# Patient Record
Sex: MALE | Race: WHITE | Employment: FULL TIME | URBAN - METROPOLITAN AREA
[De-identification: names, ages, dates, MRNs, and addresses within clinical notes are randomized per-mention and may not be internally consistent; named-entity substitution may affect disease eponyms.]

---

## 2017-01-11 ENCOUNTER — TELEPHONE (OUTPATIENT)
Dept: GASTROENTEROLOGY | Facility: CLINIC | Age: 37
End: 2017-01-11

## 2017-01-11 ENCOUNTER — TELEPHONE (OUTPATIENT)
Dept: ALLERGY | Facility: CLINIC | Age: 37
End: 2017-01-11

## 2017-01-12 ENCOUNTER — TELEPHONE (OUTPATIENT)
Dept: FAMILY MEDICINE CLINIC | Facility: CLINIC | Age: 37
End: 2017-01-12

## 2017-01-12 ENCOUNTER — OFFICE VISIT (OUTPATIENT)
Dept: FAMILY MEDICINE CLINIC | Facility: CLINIC | Age: 37
End: 2017-01-12

## 2017-01-12 VITALS — HEART RATE: 92 BPM | TEMPERATURE: 99 F | SYSTOLIC BLOOD PRESSURE: 122 MMHG | DIASTOLIC BLOOD PRESSURE: 68 MMHG

## 2017-01-12 DIAGNOSIS — J11.1 INFLUENZA: Primary | ICD-10-CM

## 2017-01-12 PROCEDURE — 99214 OFFICE O/P EST MOD 30 MIN: CPT | Performed by: FAMILY MEDICINE

## 2017-01-12 PROCEDURE — 99212 OFFICE O/P EST SF 10 MIN: CPT | Performed by: FAMILY MEDICINE

## 2017-01-12 RX ORDER — BUDESONIDE AND FORMOTEROL FUMARATE DIHYDRATE 160; 4.5 UG/1; UG/1
2 AEROSOL RESPIRATORY (INHALATION) 2 TIMES DAILY
Qty: 1 INHALER | Refills: 3 | Status: SHIPPED | OUTPATIENT
Start: 2017-01-12 | End: 2017-04-13

## 2017-01-12 RX ORDER — AMANTADINE HYDROCHLORIDE 100 MG/1
100 TABLET ORAL 2 TIMES DAILY
Qty: 14 TABLET | Refills: 0 | Status: SHIPPED | OUTPATIENT
Start: 2017-01-12 | End: 2017-01-24

## 2017-01-12 RX ORDER — OSELTAMIVIR PHOSPHATE 75 MG/1
75 CAPSULE ORAL 2 TIMES DAILY
Qty: 10 CAPSULE | Refills: 0 | Status: SHIPPED | OUTPATIENT
Start: 2017-01-12 | End: 2017-01-12

## 2017-01-12 RX ORDER — PANTOPRAZOLE SODIUM 40 MG/1
40 TABLET, DELAYED RELEASE ORAL
Qty: 30 TABLET | Refills: 1 | Status: SHIPPED | OUTPATIENT
Start: 2017-01-12 | End: 2017-02-23

## 2017-01-12 NOTE — TELEPHONE ENCOUNTER
Pt is calling back requesting to speak with a RN pt state that he can't wait that long for a prescription pt state that he don't feel good and need a medication asap please

## 2017-01-12 NOTE — TELEPHONE ENCOUNTER
Spoke with pharmacist Denver Neighbors who states that tried different forms of medication for patient for coverage and none are covered. States that the generic needs a PA.      Please advise

## 2017-01-12 NOTE — TELEPHONE ENCOUNTER
PA for Oseltamivir Phosphate 75 mg cap completed with EPS via CMM response time 24-72 hours KEY [id number].

## 2017-01-12 NOTE — TELEPHONE ENCOUNTER
Patient reports he attempted to  medication for his flu but insurance will not cover this medication without prior authorization. Due to nature of his symptoms, he cannot wait for PA process and is requesting that we send over a covered alternative.

## 2017-01-12 NOTE — PROGRESS NOTES
Lost over 40 lbs. My stomach still hurts \"I can't really eat. \"  Has been pursuing allergy testing  Always trying to burp. Now has the flu. Has middle part of back has pain. Has the chills this am.  \"I was having numbness.   And my temp went u

## 2017-01-12 NOTE — ADDENDUM NOTE
Addended by: Heath [place] on: 1/12/2017 10:27 AM     Modules accepted: Orders, Medications, Level of Service

## 2017-01-13 NOTE — TELEPHONE ENCOUNTER
Patient paged me early today morning with c/o feeling chills, very cold, fever 102.5 and body aches, ? Flu  Recommend hydration, and push fluids, tylenol/ motrin and schedule appointment in am after 8 am.  Patient seen today in ov by Dr Jeana Rosario.

## 2017-01-13 NOTE — TELEPHONE ENCOUNTER
Contacted plan spoke with rep [address] and claim is still under review. Response time is up to 24 hours. Called patient left message stating a decision may be received sometime today from insurance. Advised to contact clinic with any questions.

## 2017-01-13 NOTE — TELEPHONE ENCOUNTER
PA approved for 5 days #10/5 days effective 1/13/2017. [facility] notified of the approval; pharmacy will notify patient when medication is ready for .  Called patient and notified of the approval.

## 2017-01-14 ENCOUNTER — HOSPITAL ENCOUNTER (OUTPATIENT)
Dept: CT IMAGING | Age: 37
Discharge: HOME | End: 2017-01-14
Attending: OTOLARYNGOLOGY
Payer: MEDICAID

## 2017-01-14 DIAGNOSIS — J32.9 CHRONIC SINUSITIS, UNSPECIFIED LOCATION: ICD-10-CM

## 2017-01-14 PROCEDURE — 70486 CT MAXILLOFACIAL W/O DYE: CPT

## 2017-01-16 ENCOUNTER — TELEPHONE (OUTPATIENT)
Dept: OTOLARYNGOLOGY | Facility: CLINIC | Age: 37
End: 2017-01-16

## 2017-01-17 NOTE — TELEPHONE ENCOUNTER
Pt informed of results and recommendation to RTC to discuss treatment options. Pt verbalized understanding; states he will c/b to schedule appt.

## 2017-01-21 ENCOUNTER — NURSE ONLY (OUTPATIENT)
Dept: ALLERGY | Facility: CLINIC | Age: 37
End: 2017-01-21

## 2017-01-21 ENCOUNTER — OFFICE VISIT (OUTPATIENT)
Dept: ALLERGY | Facility: CLINIC | Age: 37
End: 2017-01-21

## 2017-01-21 VITALS
WEIGHT: 246 LBS | BODY MASS INDEX: 36.43 KG/M2 | TEMPERATURE: 98 F | RESPIRATION RATE: 18 BRPM | SYSTOLIC BLOOD PRESSURE: 128 MMHG | HEIGHT: 69 IN | DIASTOLIC BLOOD PRESSURE: 82 MMHG | HEART RATE: 76 BPM

## 2017-01-21 DIAGNOSIS — J45.40 EXTRINSIC ASTHMA, MODERATE PERSISTENT, UNCOMPLICATED: ICD-10-CM

## 2017-01-21 DIAGNOSIS — Z91.018 FOOD ALLERGY: ICD-10-CM

## 2017-01-21 DIAGNOSIS — R13.10 DYSPHAGIA, UNSPECIFIED TYPE: Primary | ICD-10-CM

## 2017-01-21 DIAGNOSIS — Z91.018 FOOD ALLERGY: Primary | ICD-10-CM

## 2017-01-21 PROCEDURE — 94010 BREATHING CAPACITY TEST: CPT | Performed by: ALLERGY & IMMUNOLOGY

## 2017-01-21 PROCEDURE — 95004 PERQ TESTS W/ALRGNC XTRCS: CPT | Performed by: ALLERGY & IMMUNOLOGY

## 2017-01-21 PROCEDURE — 99212 OFFICE O/P EST SF 10 MIN: CPT | Performed by: ALLERGY & IMMUNOLOGY

## 2017-01-21 PROCEDURE — 99214 OFFICE O/P EST MOD 30 MIN: CPT | Performed by: ALLERGY & IMMUNOLOGY

## 2017-01-21 NOTE — PROGRESS NOTES
Siddhartha Carey is a 29year old male. HPI:   Patient presents with:  Food Allergy      Patient is a 50-year-old male who presents for follow-up and for skin testing to select allergens. Patient last seen by me on December 31, 2016.   Patient has a Age of Onset   • Asthma Mother    • Prostate Cancer Father    • Hypertension Father       Social History:   Smoking Status: Never Smoker                      Smokeless Status: Never Used                        Comment: Girlfriend smokes outside    Alcohol night sweats,weight loss, irritability and lethargy  ENMT:  Negative for ear drainage, hearing loss and nasal drainage  Eyes:  Negative for eye discharge and vision loss  Gastrointestinal:  Negative for abdominal pain, diarrhea and vomiting  Integumentary: today to environmental allergens to screen for potential allergic triggers was positive to trees grass ragweed weeds dust mite cat dog and cockroach.     Recommend weight loss given somewhat of a restrictive pattern  Recs: Continue with Symbicort 160/4.5 2

## 2017-01-23 ENCOUNTER — TELEPHONE (OUTPATIENT)
Dept: FAMILY MEDICINE CLINIC | Facility: CLINIC | Age: 37
End: 2017-01-23

## 2017-01-24 ENCOUNTER — TELEPHONE (OUTPATIENT)
Dept: FAMILY MEDICINE CLINIC | Facility: CLINIC | Age: 37
End: 2017-01-24

## 2017-01-24 ENCOUNTER — OFFICE VISIT (OUTPATIENT)
Dept: FAMILY MEDICINE CLINIC | Facility: CLINIC | Age: 37
End: 2017-01-24

## 2017-01-24 VITALS
WEIGHT: 245 LBS | HEART RATE: 96 BPM | TEMPERATURE: 98 F | BODY MASS INDEX: 36 KG/M2 | DIASTOLIC BLOOD PRESSURE: 69 MMHG | SYSTOLIC BLOOD PRESSURE: 119 MMHG

## 2017-01-24 DIAGNOSIS — J01.01 ACUTE RECURRENT MAXILLARY SINUSITIS: Primary | ICD-10-CM

## 2017-01-24 PROCEDURE — 99212 OFFICE O/P EST SF 10 MIN: CPT | Performed by: FAMILY MEDICINE

## 2017-01-24 PROCEDURE — 99213 OFFICE O/P EST LOW 20 MIN: CPT | Performed by: FAMILY MEDICINE

## 2017-01-24 RX ORDER — CLINDAMYCIN HYDROCHLORIDE 300 MG/1
300 CAPSULE ORAL 3 TIMES DAILY
Qty: 30 CAPSULE | Refills: 0 | Status: SHIPPED | OUTPATIENT
Start: 2017-01-24 | End: 2017-02-04

## 2017-01-24 NOTE — PROGRESS NOTES
Pt still with congestion. No sob  Mild rt lower chest   Stool changed color. \"I have not been eating much laterly. \"  Fever last night.     Where: nasal congestion and cough  Quality (Constant/Sharp?): sharp  Severity: mild mod pain  Duration: 3 days  Ti

## 2017-01-24 NOTE — TELEPHONE ENCOUNTER
Pt states he has not been feeling well, Pt states he currently has fever of 102. Pt states he is congested and cough with Phlegm. Pt states he also a runny nose. States he was unable to sleep last night.

## 2017-01-24 NOTE — TELEPHONE ENCOUNTER
Pt verifies info below. States when seen 1/12/17 felt better after one day on Tamiflu, but since last night has started feeling symptoms returning.  States fever started ~2 hours ago (101.9 F) and took Tylenol 1000 mg ~6pm and fever did not come down and is

## 2017-01-25 NOTE — TELEPHONE ENCOUNTER
Pt needs an excuse note for work. Please excuse him for today 01/24 and tomorrow 01/25. Pt would like to p/u note at the [address] [place] [address] office.  Pt was seen today at [address] [place] [address] at 2:40pm

## 2017-01-25 NOTE — TELEPHONE ENCOUNTER
Changed to acute. Pt was seen today for sinusitis and was prescribed Clindamycin HCl 300 MG Oral Cap. Pt stts that he cannot swallow capsules and wants to know if he can pull them apart and put the powder in applesauce and then eat the applesauce?

## 2017-01-25 NOTE — TELEPHONE ENCOUNTER
Pt is requesting to have a note to return to work  Pt was off today and will be off tomorrow   Pt would like to pick that note up tomorrow @ [place]   Please advise

## 2017-01-25 NOTE — TELEPHONE ENCOUNTER
Pt was prescribed cap at today Appt   Pt stts he can't swallow pills   Pt stts he takes the powder out to swallow is that okay  Please advise

## 2017-01-25 NOTE — TELEPHONE ENCOUNTER
Informed pt that it was find to do that provided that he takes the full dose each time. Pt voiced understanding.

## 2017-01-31 ENCOUNTER — HOSPITAL ENCOUNTER (EMERGENCY)
Facility: HOSPITAL | Age: 37
Discharge: LEFT BEFORE DISCHARGE | End: 2017-01-31
Payer: MEDICAID

## 2017-01-31 ENCOUNTER — APPOINTMENT (OUTPATIENT)
Dept: GENERAL RADIOLOGY | Facility: HOSPITAL | Age: 37
End: 2017-01-31
Payer: MEDICAID

## 2017-01-31 ENCOUNTER — TELEPHONE (OUTPATIENT)
Dept: FAMILY MEDICINE CLINIC | Facility: CLINIC | Age: 37
End: 2017-01-31

## 2017-01-31 VITALS
TEMPERATURE: 98 F | WEIGHT: 240 LBS | BODY MASS INDEX: 35.55 KG/M2 | OXYGEN SATURATION: 98 % | HEART RATE: 80 BPM | RESPIRATION RATE: 20 BRPM | HEIGHT: 69 IN | DIASTOLIC BLOOD PRESSURE: 90 MMHG | SYSTOLIC BLOOD PRESSURE: 153 MMHG

## 2017-01-31 PROCEDURE — 93005 ELECTROCARDIOGRAM TRACING: CPT

## 2017-01-31 PROCEDURE — 93010 ELECTROCARDIOGRAM REPORT: CPT | Performed by: INTERNAL MEDICINE

## 2017-01-31 NOTE — TELEPHONE ENCOUNTER
Pt states very sick  No showed for apt 1/31/17 insisting to be seen ASAP offered apts available didn't want any of them wants nurse to call him

## 2017-02-01 ENCOUNTER — OFFICE VISIT (OUTPATIENT)
Dept: FAMILY MEDICINE CLINIC | Facility: CLINIC | Age: 37
End: 2017-02-01

## 2017-02-01 ENCOUNTER — APPOINTMENT (OUTPATIENT)
Dept: LAB | Age: 37
End: 2017-02-01
Attending: FAMILY MEDICINE
Payer: MEDICAID

## 2017-02-01 VITALS
TEMPERATURE: 98 F | DIASTOLIC BLOOD PRESSURE: 77 MMHG | BODY MASS INDEX: 35 KG/M2 | WEIGHT: 240 LBS | SYSTOLIC BLOOD PRESSURE: 135 MMHG | HEART RATE: 79 BPM

## 2017-02-01 DIAGNOSIS — R10.11 RUQ ABDOMINAL PAIN: ICD-10-CM

## 2017-02-01 DIAGNOSIS — R10.13 EPIGASTRIC ABDOMINAL PAIN: ICD-10-CM

## 2017-02-01 DIAGNOSIS — H57.89 EYE DISCHARGE: ICD-10-CM

## 2017-02-01 DIAGNOSIS — H57.89 EYE DISCHARGE: Primary | ICD-10-CM

## 2017-02-01 DIAGNOSIS — R19.7 DIARRHEA, UNSPECIFIED TYPE: ICD-10-CM

## 2017-02-01 LAB
ALBUMIN SERPL BCP-MCNC: 4 G/DL (ref 3.5–4.8)
ALP SERPL-CCNC: 70 U/L (ref 32–100)
ALT SERPL-CCNC: 27 U/L (ref 17–63)
AMYLASE SERPL-CCNC: 40 U/L (ref 24–108)
ANION GAP SERPL CALC-SCNC: 8 MMOL/L (ref 0–18)
AST SERPL-CCNC: 20 U/L (ref 15–41)
BILIRUB DIRECT SERPL-MCNC: 0.1 MG/DL (ref 0–0.2)
BILIRUB SERPL-MCNC: 0.9 MG/DL (ref 0.3–1.2)
BILIRUB UR QL: NEGATIVE
BUN SERPL-MCNC: 11 MG/DL (ref 8–20)
BUN/CREAT SERPL: 13.1 (ref 10–20)
CALCIUM SERPL-MCNC: 9.5 MG/DL (ref 8.5–10.5)
CHLORIDE SERPL-SCNC: 104 MMOL/L (ref 95–110)
CHOLEST SERPL-MCNC: 193 MG/DL (ref 110–200)
CO2 SERPL-SCNC: 29 MMOL/L (ref 22–32)
COLOR UR: YELLOW
CREAT SERPL-MCNC: 0.84 MG/DL (ref 0.5–1.5)
GLUCOSE SERPL-MCNC: 88 MG/DL (ref 70–99)
GLUCOSE UR-MCNC: NEGATIVE MG/DL
HDLC SERPL-MCNC: 29 MG/DL
HGB UR QL STRIP.AUTO: NEGATIVE
KETONES UR-MCNC: NEGATIVE MG/DL
LDLC SERPL CALC-MCNC: 124 MG/DL (ref 0–99)
LEUKOCYTE ESTERASE UR QL STRIP.AUTO: NEGATIVE
LIPASE SERPL-CCNC: 17 U/L (ref 22–51)
NITRITE UR QL STRIP.AUTO: NEGATIVE
NONHDLC SERPL-MCNC: 164 MG/DL
OSMOLALITY UR CALC.SUM OF ELEC: 291 MOSM/KG (ref 275–295)
PH UR: 8 [PH] (ref 5–8)
POTASSIUM SERPL-SCNC: 4.1 MMOL/L (ref 3.3–5.1)
PROT SERPL-MCNC: 7.3 G/DL (ref 5.9–8.4)
PROT UR-MCNC: 100 MG/DL
RBC #/AREA URNS AUTO: 2 /HPF
SODIUM SERPL-SCNC: 141 MMOL/L (ref 136–144)
SP GR UR STRIP: 1.02 (ref 1–1.03)
TRIGL SERPL-MCNC: 200 MG/DL (ref 1–149)
TSH SERPL-ACNC: 2.07 UIU/ML (ref 0.34–5.6)
UROBILINOGEN UR STRIP-ACNC: <2
VIT C UR-MCNC: 40 MG/DL
WBC #/AREA URNS AUTO: 4 /HPF

## 2017-02-01 PROCEDURE — 83690 ASSAY OF LIPASE: CPT

## 2017-02-01 PROCEDURE — 84443 ASSAY THYROID STIM HORMONE: CPT

## 2017-02-01 PROCEDURE — 36415 COLL VENOUS BLD VENIPUNCTURE: CPT

## 2017-02-01 PROCEDURE — 80048 BASIC METABOLIC PNL TOTAL CA: CPT

## 2017-02-01 PROCEDURE — 80076 HEPATIC FUNCTION PANEL: CPT

## 2017-02-01 PROCEDURE — 80061 LIPID PANEL: CPT

## 2017-02-01 PROCEDURE — 87338 HPYLORI STOOL AG IA: CPT

## 2017-02-01 PROCEDURE — 82150 ASSAY OF AMYLASE: CPT

## 2017-02-01 PROCEDURE — 81001 URINALYSIS AUTO W/SCOPE: CPT

## 2017-02-01 PROCEDURE — 99214 OFFICE O/P EST MOD 30 MIN: CPT | Performed by: FAMILY MEDICINE

## 2017-02-01 PROCEDURE — 99212 OFFICE O/P EST SF 10 MIN: CPT | Performed by: FAMILY MEDICINE

## 2017-02-01 NOTE — TELEPHONE ENCOUNTER
Pt states that he was seen on 1/24/17. He has no improvement since LOV. Denies new symptoms. He stated that his f/u appt was miscommunicated to him, which is why he missed his appt. He will keep appt for tomorrow.

## 2017-02-01 NOTE — PROGRESS NOTES
Blood pressure 135/77, pulse 79, temperature 98 °F (36.7 °C), temperature source Oral, weight 240 lb (108.863 kg), peak flow 400 L/min. Patient presents today complaining of 10 days of nasal congestion. History of asthma.   No dyspnea at this time some sn

## 2017-02-01 NOTE — TELEPHONE ENCOUNTER
Patient said he did go to his apt but no one was there --    He made apt to see Ga Aparicio tomorrow  8:40 am

## 2017-02-03 ENCOUNTER — HOSPITAL ENCOUNTER (OUTPATIENT)
Dept: ULTRASOUND IMAGING | Age: 37
Discharge: HOME | End: 2017-02-03
Attending: FAMILY MEDICINE
Payer: MEDICAID

## 2017-02-03 ENCOUNTER — TELEPHONE (OUTPATIENT)
Dept: INTERNAL MEDICINE CLINIC | Facility: CLINIC | Age: 37
End: 2017-02-03

## 2017-02-03 DIAGNOSIS — R10.11 RUQ ABDOMINAL PAIN: ICD-10-CM

## 2017-02-03 DIAGNOSIS — H57.89 EYE DISCHARGE: ICD-10-CM

## 2017-02-03 PROCEDURE — 76705 ECHO EXAM OF ABDOMEN: CPT

## 2017-02-03 RX ORDER — AMOXICILLIN AND CLAVULANATE POTASSIUM 875; 125 MG/1; MG/1
1 TABLET, FILM COATED ORAL 2 TIMES DAILY
Qty: 20 TABLET | Refills: 0 | Status: SHIPPED | OUTPATIENT
Start: 2017-02-03 | End: 2017-02-04

## 2017-02-03 NOTE — TELEPHONE ENCOUNTER
Dr. Rahel East - pt states that augmentin is always prescribed to him and it never works. Please advise.

## 2017-02-03 NOTE — TELEPHONE ENCOUNTER
Dr. Antonio Schneider - pt [address] [place] [address] 2/1/17. You had advised that he call you today if he has no improvement. Pt states he has the exact same symptoms. Please advise.

## 2017-02-03 NOTE — TELEPHONE ENCOUNTER
Pt stated he not improving, stated still coughing and has a lot of phlegm, also requesting lab results.

## 2017-02-04 ENCOUNTER — TELEPHONE (OUTPATIENT)
Dept: FAMILY MEDICINE CLINIC | Facility: CLINIC | Age: 37
End: 2017-02-04

## 2017-02-04 LAB — HELICOBACTER PYLORI AG, FECAL: NEGATIVE

## 2017-02-04 RX ORDER — CIPROFLOXACIN 500 MG/1
500 TABLET, FILM COATED ORAL 2 TIMES DAILY
Qty: 20 TABLET | Refills: 0 | Status: SHIPPED | OUTPATIENT
Start: 2017-02-04 | End: 2017-02-04

## 2017-02-04 RX ORDER — CIPROFLOXACIN 500 MG/1
500 TABLET, FILM COATED ORAL 2 TIMES DAILY
Qty: 20 TABLET | Refills: 0 | Status: SHIPPED | OUTPATIENT
Start: 2017-02-04 | End: 2017-02-23

## 2017-02-04 NOTE — TELEPHONE ENCOUNTER
Dr [facility] for [facility], pt asking that you be asked to change the antibiotic from Augmentin to something else in regards to his sinus problem. See messages below and advise.  Pt adds that he was supposed to see you for this issue but there was a scheduling mix-up a

## 2017-02-04 NOTE — TELEPHONE ENCOUNTER
Patient informed of gallbladder US results (identified name and ) and recommendations, as per Dr. Betzy Escobar result note copied below. Pt states already scheduled appt with Gen Surg (17) and will go to ER if severe abdominal pain, fever, or vomiting.

## 2017-02-06 ENCOUNTER — TELEPHONE (OUTPATIENT)
Dept: FAMILY MEDICINE CLINIC | Facility: CLINIC | Age: 37
End: 2017-02-06

## 2017-02-06 NOTE — TELEPHONE ENCOUNTER
Patient states he has not yet heard about his test results and has requested to speak with RN.     Result Notes      Notes Recorded by Zaina Dover RN on 2/4/2017 at 11:05 AM  Please reference telephone encounter dated 2/4/17.    ------    Notes Re

## 2017-02-06 NOTE — TELEPHONE ENCOUNTER
LM on pharmacy voice mail that Dr Zander Sabillon is aware of interraction and authorized override of warning and dispense the Cipro

## 2017-02-06 NOTE — TELEPHONE ENCOUNTER
Dr. [facility] or  [facility] [place]- pt requests note for work to only work 1/2 days due to recent medical dx. See LOV notes and US results. Pt is scheduled to see general surgery 2/8/17.

## 2017-02-08 ENCOUNTER — OFFICE VISIT (OUTPATIENT)
Dept: SURGERY | Facility: CLINIC | Age: 37
End: 2017-02-08

## 2017-02-08 VITALS — WEIGHT: 240 LBS | HEIGHT: 69 IN | BODY MASS INDEX: 35.55 KG/M2

## 2017-02-08 DIAGNOSIS — K80.10 CALCULUS OF GALLBLADDER WITH CHRONIC CHOLECYSTITIS WITHOUT OBSTRUCTION: Primary | ICD-10-CM

## 2017-02-08 PROCEDURE — 99212 OFFICE O/P EST SF 10 MIN: CPT | Performed by: SURGERY

## 2017-02-08 PROCEDURE — 99204 OFFICE O/P NEW MOD 45 MIN: CPT | Performed by: SURGERY

## 2017-02-09 ENCOUNTER — OFFICE VISIT (OUTPATIENT)
Dept: FAMILY MEDICINE CLINIC | Facility: CLINIC | Age: 37
End: 2017-02-09

## 2017-02-09 VITALS
TEMPERATURE: 98 F | SYSTOLIC BLOOD PRESSURE: 135 MMHG | RESPIRATION RATE: 18 BRPM | BODY MASS INDEX: 35 KG/M2 | HEART RATE: 74 BPM | WEIGHT: 240 LBS | DIASTOLIC BLOOD PRESSURE: 79 MMHG

## 2017-02-09 DIAGNOSIS — R09.81 NASAL CONGESTION: Primary | ICD-10-CM

## 2017-02-09 DIAGNOSIS — R10.11 RUQ ABDOMINAL PAIN: ICD-10-CM

## 2017-02-09 LAB
CONTROL LINE PRESENT WITH A CLEAR BACKGROUND (YES/NO): YES YES/NO
KIT LOT #: NORMAL NUMERIC
STREP GRP A CUL-SCR: NEGATIVE

## 2017-02-09 PROCEDURE — 99213 OFFICE O/P EST LOW 20 MIN: CPT | Performed by: FAMILY MEDICINE

## 2017-02-09 PROCEDURE — 99212 OFFICE O/P EST SF 10 MIN: CPT | Performed by: FAMILY MEDICINE

## 2017-02-09 PROCEDURE — 87880 STREP A ASSAY W/OPTIC: CPT | Performed by: FAMILY MEDICINE

## 2017-02-09 NOTE — PROGRESS NOTES
Blood pressure 135/79, pulse 74, temperature 98.1 °F (36.7 °C), temperature source Oral, resp. rate 18, weight 240 lb (108.863 kg). Events today complaining of continuing nasal congestion in 2-3 day history of sore throat. Currently on Cipro.   Has a coug

## 2017-02-10 ENCOUNTER — TELEPHONE (OUTPATIENT)
Dept: SURGERY | Facility: CLINIC | Age: 37
End: 2017-02-10

## 2017-02-10 DIAGNOSIS — K80.10 CALCULUS OF GALLBLADDER WITH CHOLECYSTITIS WITHOUT BILIARY OBSTRUCTION, UNSPECIFIED CHOLECYSTITIS ACUITY: Primary | ICD-10-CM

## 2017-02-10 NOTE — TELEPHONE ENCOUNTER
CT abdomen and pelvis scheduled 02/18/2017. Patient has [employer], started prior authorization. Tracking #[id number] per [address] Awaiting response from [address]

## 2017-02-10 NOTE — TELEPHONE ENCOUNTER
LM informing patient of CT Abdomen and Pelvis and CBC ordered by Dr. Annie Holloway prior to scheduled surgery on 03/03/2017. These tests need to be completed and resulted before procedure.  Given # to central scheduling [phone], and CB number for office given 33

## 2017-02-10 NOTE — PATIENT INSTRUCTIONS
Laparoscopic Cholecystectomy and Intra Operative Cholangiogram, possible open. Laparoscopic Cholecystectomy and Intra Operative Cholangiogram, Possible Open.  Pros and cons and possible complication were presented and discussed, infection, bleeding/Hematom

## 2017-02-13 NOTE — TELEPHONE ENCOUNTER
Per [facility], request tracking #[id number] has been approved for CT abdomen and pelvis scheduled 02/18/2017. Document placed in bin to scan.

## 2017-02-16 ENCOUNTER — TELEPHONE (OUTPATIENT)
Dept: SURGERY | Facility: CLINIC | Age: 37
End: 2017-02-16

## 2017-02-16 ENCOUNTER — TELEPHONE (OUTPATIENT)
Dept: FAMILY MEDICINE CLINIC | Facility: CLINIC | Age: 37
End: 2017-02-16

## 2017-02-16 NOTE — TELEPHONE ENCOUNTER
[facility] patient is frustrated with the answer I relayed to him, states he is just looking to see if there is something OTC he may take

## 2017-02-16 NOTE — TELEPHONE ENCOUNTER
Reason for Call/Chief Complaint: States has been feeling nauseated   Onset: Few weeks   Nursing Assessment/Associated Symptoms: Patient states is scheduled for gallbladder surgery 3/3 and states has been having an increase in nausea.  Denies vomiting, diarr

## 2017-02-16 NOTE — TELEPHONE ENCOUNTER
Pt calling states she has see GS and has surgery scheduled for 03/03 for gallbladder removal. Pt states he is having nausea, abdominal pain, pt would like to know if there is any thing he can take to help sx. Please advise.

## 2017-02-17 NOTE — TELEPHONE ENCOUNTER
Patient returned call. Advised him to make sure he continues to eat bland foods, avoid greasy, fatty foods and dairy products. Patient could try ginger ale or carbonated beverages to help settle stomach, or an antacid. Also important to stay hydrated.  Kemal

## 2017-02-18 ENCOUNTER — HOSPITAL ENCOUNTER (OUTPATIENT)
Dept: CT IMAGING | Age: 37
Discharge: HOME | End: 2017-02-18
Attending: SURGERY
Payer: MEDICAID

## 2017-02-18 ENCOUNTER — LAB ENCOUNTER (OUTPATIENT)
Dept: LAB | Age: 37
End: 2017-02-18
Attending: SURGERY
Payer: MEDICAID

## 2017-02-18 DIAGNOSIS — K80.10 CALCULUS OF GALLBLADDER WITH CHOLECYSTITIS WITHOUT BILIARY OBSTRUCTION, UNSPECIFIED CHOLECYSTITIS ACUITY: ICD-10-CM

## 2017-02-18 LAB
BASOPHILS # BLD: 0.1 K/UL (ref 0–0.2)
BASOPHILS NFR BLD: 1 %
CREAT BLD-MCNC: 0.8 MG/DL (ref 0.5–1.5)
EOSINOPHIL # BLD: 0.4 K/UL (ref 0–0.7)
EOSINOPHIL NFR BLD: 4 %
ERYTHROCYTE [DISTWIDTH] IN BLOOD BY AUTOMATED COUNT: 14.3 % (ref 11–15)
HCT VFR BLD AUTO: 43.8 % (ref 41–52)
HGB BLD-MCNC: 14.4 G/DL (ref 13.5–17.5)
LYMPHOCYTES # BLD: 2.9 K/UL (ref 1–4)
LYMPHOCYTES NFR BLD: 29 %
MCH RBC QN AUTO: 27.3 PG (ref 27–32)
MCHC RBC AUTO-ENTMCNC: 32.9 G/DL (ref 32–37)
MCV RBC AUTO: 83 FL (ref 80–100)
MONOCYTES # BLD: 0.7 K/UL (ref 0–1)
MONOCYTES NFR BLD: 7 %
NEUTROPHILS # BLD AUTO: 5.9 K/UL (ref 1.8–7.7)
NEUTROPHILS NFR BLD: 60 %
PLATELET # BLD AUTO: 251 K/UL (ref 140–400)
PMV BLD AUTO: 8.1 FL (ref 7.4–10.3)
RBC # BLD AUTO: 5.28 M/UL (ref 4.5–5.9)
WBC # BLD AUTO: 9.9 K/UL (ref 4–11)

## 2017-02-18 PROCEDURE — 82565 ASSAY OF CREATININE: CPT

## 2017-02-18 PROCEDURE — 74177 CT ABD & PELVIS W/CONTRAST: CPT

## 2017-02-18 PROCEDURE — 85025 COMPLETE CBC W/AUTO DIFF WBC: CPT

## 2017-02-18 PROCEDURE — 36415 COLL VENOUS BLD VENIPUNCTURE: CPT

## 2017-02-20 ENCOUNTER — TELEPHONE (OUTPATIENT)
Dept: SURGERY | Facility: CLINIC | Age: 37
End: 2017-02-20

## 2017-02-20 NOTE — TELEPHONE ENCOUNTER
Contacted patient. CT and labwork results will be reviewed by Dr. Keila Pike this afternoon. Will call patient with results and any further recommendations later today after his review.  Patient verbalized understanding, denied further complaints or concerns at th

## 2017-02-21 ENCOUNTER — TELEPHONE (OUTPATIENT)
Dept: SURGERY | Facility: CLINIC | Age: 37
End: 2017-02-21

## 2017-02-21 DIAGNOSIS — R19.09 ABDOMINAL MASS OF OTHER SITE: Primary | ICD-10-CM

## 2017-02-21 NOTE — TELEPHONE ENCOUNTER
Contacted patient. Will review results with Dr. Johnson Butler tomorrow, 02/22/2017 and will call patient back with results and his recommendations. Patient verbalized understanding, denied further complaints or concerns.

## 2017-02-22 ENCOUNTER — TELEPHONE (OUTPATIENT)
Dept: FAMILY MEDICINE CLINIC | Facility: CLINIC | Age: 37
End: 2017-02-22

## 2017-02-22 DIAGNOSIS — R93.5 ABNORMAL CT OF THE ABDOMEN: Primary | ICD-10-CM

## 2017-02-23 ENCOUNTER — TELEPHONE (OUTPATIENT)
Dept: SURGERY | Facility: CLINIC | Age: 37
End: 2017-02-23

## 2017-02-23 ENCOUNTER — TELEPHONE (OUTPATIENT)
Dept: GASTROENTEROLOGY | Facility: CLINIC | Age: 37
End: 2017-02-23

## 2017-02-23 ENCOUNTER — OFFICE VISIT (OUTPATIENT)
Dept: FAMILY MEDICINE CLINIC | Facility: CLINIC | Age: 37
End: 2017-02-23

## 2017-02-23 VITALS
SYSTOLIC BLOOD PRESSURE: 120 MMHG | WEIGHT: 241.31 LBS | BODY MASS INDEX: 36 KG/M2 | DIASTOLIC BLOOD PRESSURE: 76 MMHG | TEMPERATURE: 98 F | HEART RATE: 69 BPM

## 2017-02-23 DIAGNOSIS — Q89.09 SPLEEN ANOMALY: Primary | ICD-10-CM

## 2017-02-23 DIAGNOSIS — R10.11 RUQ ABDOMINAL PAIN: ICD-10-CM

## 2017-02-23 DIAGNOSIS — R10.13 EPIGASTRIC PAIN: ICD-10-CM

## 2017-02-23 PROCEDURE — 99212 OFFICE O/P EST SF 10 MIN: CPT | Performed by: FAMILY MEDICINE

## 2017-02-23 PROCEDURE — 99214 OFFICE O/P EST MOD 30 MIN: CPT | Performed by: FAMILY MEDICINE

## 2017-02-23 RX ORDER — PANTOPRAZOLE SODIUM 40 MG/1
40 TABLET, DELAYED RELEASE ORAL
Qty: 60 TABLET | Refills: 1 | Status: SHIPPED | OUTPATIENT
Start: 2017-02-23 | End: 2017-03-10

## 2017-02-23 NOTE — TELEPHONE ENCOUNTER
Pt is states he had a CT Scan and Dr Troy Gordon is recommending pt to see EMS again. Pt requesting to speak with RN about findings. Pls call. Thank you.

## 2017-02-23 NOTE — TELEPHONE ENCOUNTER
Pt has chronic nausea, negative EGD, ultrasound showed cholelithiasis. Dr. Troy Gordon ordered CT abdomen and pelvis which showed 4 splenic lesions. He is referred back to me.   MRI has been ordered per Dr. Troy Gordon, please schedule return appointment with me

## 2017-02-23 NOTE — TELEPHONE ENCOUNTER
Pt is contacted re: below and he states that he will see any GI MD who is first available; he had appt with Dr. Sean Solorio on 12/27/16 for GERD and dysphagia and then EGD on 1/11/17; he is made aware that message will be sent to Dr. Sean Solorio first since she last saw

## 2017-02-23 NOTE — IMAGING NOTE
Spoke w/pt about inhaler use. He uses Proair at least BID if not up to 6 x daily in addition to Symbicort. Discussed need for pretx with 13/7/1 hour prednisone prep to prevent reactive airway issue.  Pt verbalized understanding and will discuss with his ho

## 2017-02-23 NOTE — PROGRESS NOTES
Pt can't lay down for an mri of abd  Needs open mri. Pt with a lot of pain in anterior abd. \"I can't go to work all the time. \"  Not loosing weight. Ct showed something in spleen. Asthma is controlled.     Patient's past medical surgical family soc

## 2017-02-23 NOTE — TELEPHONE ENCOUNTER
Pt. States that prior Pedor George is needed to get MRI of Abdomen done - Pt. Requesting for RN to call Pedro George in - Phone # [phone] and the Case Reference # [id number]. Pt. Requesting to get a call back with auth info. ,

## 2017-02-23 NOTE — TELEPHONE ENCOUNTER
Pt stts that Dr. Jim Gallo wants pt to see Triston Narayan for masses on his spleen and a hernia.  Please see pt's CT results

## 2017-02-23 NOTE — TELEPHONE ENCOUNTER
Discussed CT scan with MEV- there are some abnormalities, cancel surgery for now, pt to have an MRI of the abdomen and evaluation by GI. I called pt, gave him results, MEV's recommendations to cancel surgery, have MRI and see GI.  Pt voices understanding

## 2017-02-23 NOTE — TELEPHONE ENCOUNTER
Contacted patient. Relayed information per Dr. Treasure Núñez, see telephone encounter 02/21/2017. Patient is to see GI Dr. Svitlana Mak and have MRI performed for closer look of abnormalities detected on CT.  Patient verbalized understanding, denied further complaints or con

## 2017-02-24 ENCOUNTER — TELEPHONE (OUTPATIENT)
Dept: SURGERY | Facility: CLINIC | Age: 37
End: 2017-02-24

## 2017-02-24 NOTE — TELEPHONE ENCOUNTER
Authorization request was already started by patient, clinical information was faxed to [employer] at [phone]. Please see other TE from 02/24/2017.

## 2017-02-24 NOTE — TELEPHONE ENCOUNTER
Contacted patient. Patient started prior authorization request for MRI abdomen. Tracking # [id number]. Received request from [facility] this AM asking for further clinical information.  Clinical information was faxed to [facility]ainer [facility] at [phone], received fax confirmatio

## 2017-02-24 NOTE — TELEPHONE ENCOUNTER
[facility] states that MRI abdomen has been approved . Patient can schedule at [facility] [address] at [address] [place] [address], [address] [place] [address],[address] only. Ph: [phone]. Gave Authorization # [id number]. And can schedule from 02/23-03/25/17. Please advise.  Thank you

## 2017-02-27 ENCOUNTER — TELEPHONE (OUTPATIENT)
Dept: SURGERY | Facility: CLINIC | Age: 37
End: 2017-02-27

## 2017-02-27 NOTE — TELEPHONE ENCOUNTER
Contacted patient, informed him of below. Patient states he has an appt to have MRI performed today at 1200 at [place] [address]. Also LM with fax number so results can be faxed to us once available.  Patient verbalized understanding, denied further complain

## 2017-02-27 NOTE — TELEPHONE ENCOUNTER
CT report faxed to [phone] Attn. Lissa Restrepo as requested. 1329: Received confirmation receipt, status \"completed\" from [phone]. Documents placed in bin to scan.

## 2017-02-27 NOTE — TELEPHONE ENCOUNTER
Maged [place] requesting a copy of patient CT report faxed over so she can compare side to side the MRI and CT. Please fax to [phone].

## 2017-02-28 ENCOUNTER — TELEPHONE (OUTPATIENT)
Dept: SURGERY | Facility: CLINIC | Age: 37
End: 2017-02-28

## 2017-02-28 ENCOUNTER — HOSPITAL ENCOUNTER (OUTPATIENT)
Dept: MRI IMAGING | Age: 37
Discharge: HOME | End: 2017-02-28
Attending: SURGERY
Payer: MEDICAID

## 2017-02-28 NOTE — TELEPHONE ENCOUNTER
Patient had MRI done yesterday [place] [facility] at [facility]. Requesting results. Please advise. Thank you.

## 2017-02-28 NOTE — TELEPHONE ENCOUNTER
LM-Have not yet received MRI results from [place] [address] MRI. Need results faxed over, if we receive results today or tomorrow Dr. Marya Davis can review them tomorrow 03/01/2017. We will then call patient back with results and Dr. Portillo Reveal recommendations.  CB number g

## 2017-03-01 NOTE — TELEPHONE ENCOUNTER
Patient calling again. States [facility] faxed over results yesterday on 02/28 around 2pm. Would like to get results. Please advise. Thank you.

## 2017-03-02 NOTE — TELEPHONE ENCOUNTER
Discussed MRI results with MEV- We need the MRI disc so [address] [place] [address] Radiologist can review it. Pt also needs to see GI/ MEV. I called pt, explained per MEV he wants the MRI disc so the [address] [place] [address] Radiologist can compare to ct scan.  Explain MEV also wants pt to see

## 2017-03-02 NOTE — TELEPHONE ENCOUNTER
Pt contacted and he states he did not complete the EGD in January b/c he was not feeling well at the time. He states that he does NOT understanding why he is evening seeing GI as he is no longer having gallbladder surgery.  He was upset and wanted to know w

## 2017-03-02 NOTE — TELEPHONE ENCOUNTER
Patient arrived at  asking for copy of MRI report. Copy of report given to patient. Patient also dropped off MRI disc to be read by one of our radiologists.

## 2017-03-02 NOTE — TELEPHONE ENCOUNTER
Please see if patient had EGD which he canceled previously. ( January 11,2017) I cannot find report. If he has had EGD done, then placed report on my desk.   If he has not had EGD done, then schedule him directly for EGD next week to cancel Monday office v

## 2017-03-02 NOTE — TELEPHONE ENCOUNTER
Pt contacted and he is not able to come in on [email or web address]. He is wondering if he can be seen at 2pm on Tuesday 3/7/17, you are on-call that day. Please advise if it is OK? MRI results received and placed on your desk for your review, thank you.

## 2017-03-02 NOTE — TELEPHONE ENCOUNTER
Check to see if patient had EGD done in January as advised. If he has not, then's reschedule EGD and cancel office visit appointment. I have discussed the case with Dr. presley who agrees that patient needs upper endoscopy done.   No I cannot see him at

## 2017-03-03 ENCOUNTER — TELEPHONE (OUTPATIENT)
Dept: FAMILY MEDICINE CLINIC | Facility: CLINIC | Age: 37
End: 2017-03-03

## 2017-03-03 ENCOUNTER — OFFICE VISIT (OUTPATIENT)
Dept: FAMILY MEDICINE CLINIC | Facility: CLINIC | Age: 37
End: 2017-03-03

## 2017-03-03 ENCOUNTER — TELEPHONE (OUTPATIENT)
Dept: GASTROENTEROLOGY | Facility: CLINIC | Age: 37
End: 2017-03-03

## 2017-03-03 VITALS
WEIGHT: 248.81 LBS | HEART RATE: 71 BPM | DIASTOLIC BLOOD PRESSURE: 81 MMHG | BODY MASS INDEX: 37 KG/M2 | SYSTOLIC BLOOD PRESSURE: 152 MMHG

## 2017-03-03 DIAGNOSIS — Z00.00 ROUTINE PHYSICAL EXAMINATION: ICD-10-CM

## 2017-03-03 DIAGNOSIS — K81.9 CHOLECYSTITIS: ICD-10-CM

## 2017-03-03 DIAGNOSIS — Q89.09 SPLEEN ANOMALY: Primary | ICD-10-CM

## 2017-03-03 PROCEDURE — 99395 PREV VISIT EST AGE 18-39: CPT | Performed by: FAMILY MEDICINE

## 2017-03-03 NOTE — TELEPHONE ENCOUNTER
Pt contacted and reviewed the message below in detail. He verbalized understanding, but states he already had an XR esophagus on 12/16/16 and that was normal so he will NOT go through with the EGD.  He states he wants to know what the next steps are in rega

## 2017-03-03 NOTE — TELEPHONE ENCOUNTER
Pt has been scheduled for the following. Scheduled for:  EGD 05802  Date: Wednesday, March 22, 2017  Location:  [facility]  Sedation:  IV  Time:  2pm (arrival 1pm)  Prep: NPO at midnight  Meds/Allergies Reconciled?:  Yes.   Diagnosis with codes:  Dysphagia R13

## 2017-03-03 NOTE — PROGRESS NOTES
Blood pressure 152/81, pulse 71, weight 248 lb 12.8 oz (112.855 kg). Patient presents today  with abnormal abdominal MRI. Lesions were shown on the spleen. He is awaiting the review of radiology.   He otherwise feels well denies chest pain dyspnea tr

## 2017-03-03 NOTE — TELEPHONE ENCOUNTER
Per pt, he spoke with Dr Diane Zhang and dr told him that he needs to go and see an Oncology dr due to they found out a lesion in his spleen area. No appt yet.

## 2017-03-03 NOTE — TELEPHONE ENCOUNTER
I have spoken to the patient at length regarding the results of his CT scan, MRI, and ultrasound results. I have recommended that he get upper endoscopy performed, however he \"just does not want to do it\".   He had dysphagia and a normal esophagram.  He

## 2017-03-03 NOTE — TELEPHONE ENCOUNTER
Patient was seen in office by Dr. Tanja Craven and was scheduled for laparoscopic cholecystectomy on 02/08/2017. Patient was required to complete CT abdomen and pelvis and a CBC prior to having surgery.  CT abdomen and pelvis was performed on 02/18/2017, showed

## 2017-03-03 NOTE — TELEPHONE ENCOUNTER
Pt contacted and he was concerned about the insurance coverage, I informed him that with [employer] the procedure must be scheduled at [address] [place] [address] and no pre-authorization is required. He verbalized understanding.

## 2017-03-03 NOTE — TELEPHONE ENCOUNTER
Pt informed CT was taken to be scanned this AM.  Dr. Jose David will review and we will call him Monday with next step. Reminded pt  that in the mean time he need to procede with GI consult prior to surgery with MEV.

## 2017-03-06 ENCOUNTER — TELEPHONE (OUTPATIENT)
Dept: FAMILY MEDICINE CLINIC | Facility: CLINIC | Age: 37
End: 2017-03-06

## 2017-03-06 ENCOUNTER — NURSE ONLY (OUTPATIENT)
Dept: FAMILY MEDICINE CLINIC | Facility: CLINIC | Age: 37
End: 2017-03-06

## 2017-03-06 ENCOUNTER — TELEPHONE (OUTPATIENT)
Dept: SURGERY | Facility: CLINIC | Age: 37
End: 2017-03-06

## 2017-03-06 DIAGNOSIS — Z11.1 SCREENING-PULMONARY TB: Primary | ICD-10-CM

## 2017-03-06 PROCEDURE — 86580 TB INTRADERMAL TEST: CPT | Performed by: FAMILY MEDICINE

## 2017-03-06 NOTE — TELEPHONE ENCOUNTER
Patient contacted, states that he would like RN to contact Dr. Daniels Double office to see if endoscopy could be scheduled sooner. Informed patient that I am not sure of Dr. Jeremiah Lenz procedure schedule, I will forward to GI RN's. Patient verbalized understanding.

## 2017-03-06 NOTE — TELEPHONE ENCOUNTER
Please see TE from 2/28/17- Pt on 3/3/17 refused to have EGD scheduled and done at that time, and states he was asymptomatic and now demanding to be seen asap.      Reviewed Dr. Pozo Flair schedule for procedure's and currently there aren't any sooner appts [place]

## 2017-03-06 NOTE — TELEPHONE ENCOUNTER
I am here Friday until 11 AM for procedures. Please schedule him for this Friday, March 9 in the a.m. before 11 AM.  Also there are 2 procedures on Thursday that should be moved to Friday, please talk to [place] about that.

## 2017-03-06 NOTE — TELEPHONE ENCOUNTER
Patient calling to requesting to speak with Meche Gomez about his results from abdominal MRI and CT. Patient states depending on results, he may need referral for GI.    Requesting call back

## 2017-03-06 NOTE — PROGRESS NOTES
Patient was seen for his TB screening. Patient informed to return within 48-72 hours for test reading.

## 2017-03-06 NOTE — TELEPHONE ENCOUNTER
Aia 16, patient is requesting for you to look at his imaging results. Dr. Sherman Numbers recommended and EGD but no availability any time soon. Patient stated he was told to have Aicathie 16 decide whether he needs to just have a follow up test in 6 months or see oncology.  P

## 2017-03-06 NOTE — TELEPHONE ENCOUNTER
Scheduled for:  EGD 17713  Date:    From-3/22/17  To-3/10/17  Location:  [facility]  Sedation:  IV  Time:   From-1400  To-0730  Prep:  NPO after midnight  Meds/Allergies Reconciled?:  Yes  Diagnosis with codes:  Dysphagia R13.10  Was patient informed to call insu

## 2017-03-07 NOTE — TELEPHONE ENCOUNTER
Recommendations per Dr. [facility] reviewed. Pt verbalized understanding, agreed with information relayed, and denied further questions at this time.

## 2017-03-08 ENCOUNTER — NURSE ONLY (OUTPATIENT)
Dept: FAMILY MEDICINE CLINIC | Facility: CLINIC | Age: 37
End: 2017-03-08

## 2017-03-08 DIAGNOSIS — Z23 NEED FOR TUBERCULOSIS VACCINATION: Primary | ICD-10-CM

## 2017-03-08 LAB — INDURATION (): 0 MM (ref 0–11)

## 2017-03-08 NOTE — PROGRESS NOTES
Pt was verified via name and date of birth and result was read of right arm with negative results.  Pt understood results and stated that he has an appt on Friday for f/u

## 2017-03-10 ENCOUNTER — SURGERY (OUTPATIENT)
Age: 37
End: 2017-03-10

## 2017-03-10 ENCOUNTER — HOSPITAL ENCOUNTER (OUTPATIENT)
Facility: HOSPITAL | Age: 37
Setting detail: HOSPITAL OUTPATIENT SURGERY
Discharge: HOME | End: 2017-03-10
Attending: INTERNAL MEDICINE | Admitting: INTERNAL MEDICINE
Payer: MEDICAID

## 2017-03-10 ENCOUNTER — OFFICE VISIT (OUTPATIENT)
Dept: FAMILY MEDICINE CLINIC | Facility: CLINIC | Age: 37
End: 2017-03-10

## 2017-03-10 VITALS
SYSTOLIC BLOOD PRESSURE: 127 MMHG | DIASTOLIC BLOOD PRESSURE: 77 MMHG | HEART RATE: 80 BPM | WEIGHT: 238 LBS | BODY MASS INDEX: 35 KG/M2

## 2017-03-10 DIAGNOSIS — R09.81 NASAL CONGESTION: ICD-10-CM

## 2017-03-10 DIAGNOSIS — K21.00 GASTROESOPHAGEAL REFLUX DISEASE WITH ESOPHAGITIS: Primary | ICD-10-CM

## 2017-03-10 DIAGNOSIS — J34.3 HYPERTROPHY OF NASAL TURBINATES: ICD-10-CM

## 2017-03-10 PROBLEM — K20.90 ESOPHAGITIS: Status: ACTIVE | Noted: 2017-03-10

## 2017-03-10 PROBLEM — K29.70 GASTRITIS: Status: ACTIVE | Noted: 2017-03-10

## 2017-03-10 PROCEDURE — 0DB68ZX EXCISION OF STOMACH, VIA NATURAL OR ARTIFICIAL OPENING ENDOSCOPIC, DIAGNOSTIC: ICD-10-PCS | Performed by: INTERNAL MEDICINE

## 2017-03-10 PROCEDURE — 0DB38ZX EXCISION OF LOWER ESOPHAGUS, VIA NATURAL OR ARTIFICIAL OPENING ENDOSCOPIC, DIAGNOSTIC: ICD-10-PCS | Performed by: INTERNAL MEDICINE

## 2017-03-10 PROCEDURE — 0DB28ZX EXCISION OF MIDDLE ESOPHAGUS, VIA NATURAL OR ARTIFICIAL OPENING ENDOSCOPIC, DIAGNOSTIC: ICD-10-PCS | Performed by: INTERNAL MEDICINE

## 2017-03-10 PROCEDURE — 99213 OFFICE O/P EST LOW 20 MIN: CPT | Performed by: FAMILY MEDICINE

## 2017-03-10 PROCEDURE — 43239 EGD BIOPSY SINGLE/MULTIPLE: CPT | Performed by: INTERNAL MEDICINE

## 2017-03-10 PROCEDURE — 99152 MOD SED SAME PHYS/QHP 5/>YRS: CPT | Performed by: INTERNAL MEDICINE

## 2017-03-10 PROCEDURE — 99212 OFFICE O/P EST SF 10 MIN: CPT | Performed by: FAMILY MEDICINE

## 2017-03-10 RX ORDER — ESOMEPRAZOLE MAGNESIUM 40 MG/1
40 CAPSULE, DELAYED RELEASE ORAL DAILY
Qty: 90 CAPSULE | Refills: 0 | Status: SHIPPED | OUTPATIENT
Start: 2017-03-10 | End: 2017-09-25

## 2017-03-10 RX ORDER — SODIUM CHLORIDE, SODIUM LACTATE, POTASSIUM CHLORIDE, CALCIUM CHLORIDE 600; 310; 30; 20 MG/100ML; MG/100ML; MG/100ML; MG/100ML
INJECTION, SOLUTION INTRAVENOUS CONTINUOUS
Status: DISCONTINUED | OUTPATIENT
Start: 2017-03-10 | End: 2017-03-10

## 2017-03-10 RX ORDER — PANTOPRAZOLE SODIUM 40 MG/1
40 TABLET, DELAYED RELEASE ORAL
Status: DISCONTINUED | OUTPATIENT
Start: 2017-03-10 | End: 2017-03-10

## 2017-03-10 RX ORDER — MIDAZOLAM HYDROCHLORIDE 1 MG/ML
INJECTION INTRAMUSCULAR; INTRAVENOUS
Status: DISCONTINUED | OUTPATIENT
Start: 2017-03-10 | End: 2017-03-10

## 2017-03-10 RX ORDER — SODIUM CHLORIDE 0.9 % (FLUSH) 0.9 %
10 SYRINGE (ML) INJECTION AS NEEDED
Status: DISCONTINUED | OUTPATIENT
Start: 2017-03-10 | End: 2017-03-10

## 2017-03-10 RX ORDER — MIDAZOLAM HYDROCHLORIDE 1 MG/ML
1 INJECTION INTRAMUSCULAR; INTRAVENOUS EVERY 5 MIN PRN
Status: DISCONTINUED | OUTPATIENT
Start: 2017-03-10 | End: 2017-03-10

## 2017-03-10 RX ORDER — ALBUTEROL SULFATE 90 UG/1
AEROSOL, METERED RESPIRATORY (INHALATION)
Qty: 8.5 G | Refills: 11 | Status: SHIPPED | OUTPATIENT
Start: 2017-03-10 | End: 2017-11-18

## 2017-03-10 RX ORDER — IPRATROPIUM BROMIDE 42 UG/1
2 SPRAY, METERED NASAL 3 TIMES DAILY PRN
Qty: 1 BOTTLE | Refills: 5 | Status: SHIPPED | OUTPATIENT
Start: 2017-03-10 | End: 2017-09-25

## 2017-03-10 NOTE — PROGRESS NOTES
Blood pressure 127/77, pulse 80, weight 238 lb (107.956 kg). HAD UPPER ENDOSCOPY today. Reports that he continues to have discomfort and heartburn up in his chest even after using pantoprazole twice daily and omeprazole twice daily.   He is awaiting clear

## 2017-03-10 NOTE — H&P
History & Physical Examination    Patient Name: Apollo Partida  MRN: [id number]  [facility]: [id number]  YOB: 1980    Diagnosis: GERD, dysphagia      Prescriptions prior to admission:  Pantoprazole Sodium 40 MG Oral Tab EC Take 1 tablet (40 mg ASA Sensitivity   • Esophageal reflux    • Sleep apnea    • Problems with swallowing      History reviewed. No pertinent past surgical history.   Family History   Problem Relation Age of Onset   • Asthma Mother    • Prostate Cancer Father    • Hypertension

## 2017-03-10 NOTE — BRIEF OP NOTE
[facility] ENDOSCOPY LAB SUITES  Brief Op Note     Carol Self Location: OR   CSN [id number] MRN [id number]   Admission Date 3/10/2017 Operation Date 3/10/2017   Attending Physician Bruno Wooten* Operating Physician Isa Casas

## 2017-03-11 NOTE — OPERATIVE REPORT
[facility]    PATIENT'S NAME: Terrie Preez   ATTENDING PHYSICIAN: Vincent Zheng MD   OPERATING PHYSICIAN: Vincent Zheng MD   PATIENT ACCOUNT#:   [de-identified]    LOCATION:  [facility] ENDO POOL ROOM 4 [facility] 10  M healing ulceration at the squamocolumnar junction at 40 cm. This was photographed and biopsies x6 were taken of the squamocolumnar junction. No stricture or mass lesion were seen. The ulceration was superficial, 2 mm x 1 mm.   The diaphragmatic impressio

## 2017-03-13 ENCOUNTER — TELEPHONE (OUTPATIENT)
Dept: FAMILY MEDICINE CLINIC | Facility: CLINIC | Age: 37
End: 2017-03-13

## 2017-03-13 VITALS
HEART RATE: 80 BPM | RESPIRATION RATE: 17 BRPM | SYSTOLIC BLOOD PRESSURE: 137 MMHG | WEIGHT: 238 LBS | OXYGEN SATURATION: 96 % | HEIGHT: 69 IN | BODY MASS INDEX: 35.25 KG/M2 | DIASTOLIC BLOOD PRESSURE: 76 MMHG

## 2017-03-14 ENCOUNTER — TELEPHONE (OUTPATIENT)
Dept: GASTROENTEROLOGY | Facility: CLINIC | Age: 37
End: 2017-03-14

## 2017-03-14 NOTE — TELEPHONE ENCOUNTER
Routed to on-call MD as Dr. Kerry Dawkins is out of the office,     Pt calling to get biopsy results from his EGD from 3/10/17, please advise, thank you.

## 2017-03-15 NOTE — TELEPHONE ENCOUNTER
Please note Proair IS no longer on the formulary and is not covered. PA for Proair [facility] 10/ mcg/act completed with EPS via CMM response time 24-72 hours KEY [id number].

## 2017-03-16 ENCOUNTER — LAB ENCOUNTER (OUTPATIENT)
Dept: LAB | Age: 37
End: 2017-03-16
Attending: INTERNAL MEDICINE
Payer: MEDICAID

## 2017-03-16 ENCOUNTER — OFFICE VISIT (OUTPATIENT)
Dept: HEMATOLOGY/ONCOLOGY | Facility: HOSPITAL | Age: 37
End: 2017-03-16
Attending: INTERNAL MEDICINE
Payer: MEDICAID

## 2017-03-16 VITALS
BODY MASS INDEX: 35.55 KG/M2 | WEIGHT: 240 LBS | DIASTOLIC BLOOD PRESSURE: 74 MMHG | TEMPERATURE: 99 F | HEART RATE: 80 BPM | RESPIRATION RATE: 18 BRPM | SYSTOLIC BLOOD PRESSURE: 125 MMHG | HEIGHT: 69 IN

## 2017-03-16 DIAGNOSIS — K20.0 ESOPHAGITIS, EOSINOPHILIC: ICD-10-CM

## 2017-03-16 DIAGNOSIS — J45.909 UNCOMPLICATED ASTHMA, UNSPECIFIED ASTHMA SEVERITY: ICD-10-CM

## 2017-03-16 DIAGNOSIS — K81.1 CHOLECYSTITIS, CHRONIC: ICD-10-CM

## 2017-03-16 DIAGNOSIS — R16.1 SPLENIC MASS: Primary | ICD-10-CM

## 2017-03-16 DIAGNOSIS — R16.1 SPLENIC MASS: ICD-10-CM

## 2017-03-16 LAB
ERYTHROCYTE [SEDIMENTATION RATE] IN BLOOD: 6 MM/HR (ref 0–15)
LDH SERPL L TO P-CCNC: 121 U/L (ref 98–192)

## 2017-03-16 PROCEDURE — 85652 RBC SED RATE AUTOMATED: CPT

## 2017-03-16 PROCEDURE — 99244 OFF/OP CNSLTJ NEW/EST MOD 40: CPT | Performed by: INTERNAL MEDICINE

## 2017-03-16 PROCEDURE — 36415 COLL VENOUS BLD VENIPUNCTURE: CPT

## 2017-03-16 PROCEDURE — 83615 LACTATE (LD) (LDH) ENZYME: CPT

## 2017-03-16 RX ORDER — KETOTIFEN FUMARATE 0.35 MG/ML
2 SOLUTION/ DROPS OPHTHALMIC 2 TIMES DAILY
COMMUNITY
End: 2018-12-11 | Stop reason: ALTCHOICE

## 2017-03-16 RX ORDER — MELATONIN
5000 EVERY OTHER DAY
COMMUNITY

## 2017-03-16 RX ORDER — ASCORBIC ACID 500 MG
500 TABLET ORAL DAILY
COMMUNITY

## 2017-03-16 NOTE — TELEPHONE ENCOUNTER
Pt contacted and discussed EGD results ulcerative esophagitis + gastritis, possible EoE on bx  He is now on nexium 40mg per day  Lost 40lbs     Will forward to Dr. Gokul Pineda to contact and determine next steps.

## 2017-03-16 NOTE — CONSULTS
[facility]    PATIENT'S NAME: Sunday Schiff   CONSULTING PHYSICIAN: Pecolia Mins. Gilford Gauze, MD   PATIENT ACCOUNT #: [de-identified] LOCATION: [address] [place] [address] RECORD #: [id number] YOB: 1980   CONSULTATION DATE: 03/16/2017       SUSANA headaches or vision change. He denies any focal neurological deficits. He is otherwise without complaints. PAST MEDICAL HISTORY:  Asthma, chronic sinusitis, eosinophilic esophagitis, chronic cholecystitis/cholelithiasis.     MEDICATION:  Nexium, albut is no other evidence for any lymphoproliferative disorder or other primary malignancy on imaging clinically.   The patient's splenic lesions are more likely benign; however, definitive diagnosis would be made with possible splenectomy, which we do not recom

## 2017-03-20 ENCOUNTER — TELEPHONE (OUTPATIENT)
Dept: HEMATOLOGY/ONCOLOGY | Facility: HOSPITAL | Age: 37
End: 2017-03-20

## 2017-03-20 ENCOUNTER — TELEPHONE (OUTPATIENT)
Dept: ALLERGY | Facility: CLINIC | Age: 37
End: 2017-03-20

## 2017-03-20 NOTE — TELEPHONE ENCOUNTER
Please tell patient that I have sent a prescription to his pharmacy for fluticasone inhaler, 2 puffs twice daily. He should not inhale the medication he should spray into a teaspoon and swallow 30 minutes before meals.

## 2017-03-20 NOTE — TELEPHONE ENCOUNTER
Pt is asking if Dr. Estefania Schwartz responded to message sent to her previously; he is made aware that she has not yet answered; he is concerned re: possibly getting new rx to start treatment for EoE; ,message will be sent again to Dr. Estefania Schwartz.

## 2017-03-20 NOTE — TELEPHONE ENCOUNTER
Pt is contacted re: below appt and he is agreeable with this; he is wanting to start rx for EoE prior to this appt to see if it will make any difference in symptoms.

## 2017-03-20 NOTE — TELEPHONE ENCOUNTER
Pt. contacted and discussed options for appointments but pt stts that he can not come at times available in schedule. Pt offered several different times and denies but stts that will call back during week for possible cancellations.  Pt stts that Dr. Marquis Nicanor brand

## 2017-03-20 NOTE — TELEPHONE ENCOUNTER
Left a message for patient to contact our office regarding appt. Per Dr. Shakira Negron . Patient will need a 15 minute office visit and a testing appt. Patient will need to be off allergy medications for 5 days prior to office visit.  Please schedule patient accordi

## 2017-03-20 NOTE — TELEPHONE ENCOUNTER
Pt is contacted re: below communication from Dr. Juliet Burciaga and pt is read contents of EGD result letter about which he verbalized understanding; due to ongoing difficulty with swallowing and tightness in throat, he did try to get another appt with Dr. Sidney Cavazos o

## 2017-03-20 NOTE — TELEPHONE ENCOUNTER
Call reviewed and noted. Agree with triage advice provided. Patient to keep his currently scheduled appointment.

## 2017-03-20 NOTE — TELEPHONE ENCOUNTER
Pt returning call, states he can only come to an appt between 9-11am or after 4pm, he wants to know if he can just be started on a medication then have the appt when it fits his schedule, pt declined to schedule appt, please advise.

## 2017-03-20 NOTE — TELEPHONE ENCOUNTER
Dr. Vonda Stokes- pt was given EGD results and he is asking if you or Dr. Xenia Palacio will be prescribing steroids (MDI) since he wants to start rx ASAP for ongoing symptoms; please advise; thanks! He has appt with Dr. Xenia Palacio on 3/30/17.

## 2017-03-20 NOTE — TELEPHONE ENCOUNTER
I left a voicemail message for patient to call back for discussion of his test results. He has probable eosinophilic esophagitis. I want to refer him to Dr. Sara Kaiser of allergy as well.   And I want to discuss with him whether his symptoms are improved since

## 2017-03-20 NOTE — TELEPHONE ENCOUNTER
Pt calling regarding Dr Ananda Schneider saying that pt needs to see Dr. Lexi Brower regarding his results from his GI results. Pt state that Dr. Ananda Schneider advise that he needs to get in to see Dr. Lexi Brower asap. .. please advise

## 2017-03-20 NOTE — TELEPHONE ENCOUNTER
Dr. Gianni Odell- pt was given EGD results and he is asking if you or Dr. Scooby Marquez will be prescribing steroids (MDI) since he wants to start rx ASAP for ongoing symptoms; please advise; thanks!

## 2017-03-20 NOTE — TELEPHONE ENCOUNTER
Per Dr. Gibson Chaudhry office, there is F/U appt on 3/30/17 at [phone]; pt is booked for this and, if this is not acceptable, she will be called back.

## 2017-03-20 NOTE — TELEPHONE ENCOUNTER
Pt is contacted and made aware of below new rx that was sent to [address] [place] [address] in [facility]; pt is made aware of how to take this and to rinse mouth with small sip of water after each dose to prevent thrush; he is agreeable with plan.

## 2017-03-21 ENCOUNTER — TELEPHONE (OUTPATIENT)
Dept: HEMATOLOGY/ONCOLOGY | Facility: HOSPITAL | Age: 37
End: 2017-03-21

## 2017-03-21 NOTE — TELEPHONE ENCOUNTER
SW reach out to patient as a follow up to patient's distress screen. SW provide introduction of SW services and supports. Patient express question regarding insurance approval for his CT scan.  SW reiterate information disclosed from NATASHA Lopez that at raquel

## 2017-03-21 NOTE — TELEPHONE ENCOUNTER
Received call back from Alba, I let him know that his labs were normal.  He states that he has not heard back from our office regarding authorization of his CT.   I let him know that I would check with our insurance specialists regarding approval of his

## 2017-03-21 NOTE — TELEPHONE ENCOUNTER
LM with patient that the recent labs he had done for Dr Pack Daughters were normal, he can call if any questions.

## 2017-03-23 ENCOUNTER — TELEPHONE (OUTPATIENT)
Dept: GASTROENTEROLOGY | Facility: CLINIC | Age: 37
End: 2017-03-23

## 2017-03-23 NOTE — TELEPHONE ENCOUNTER
Pt contacted. His eosinophilic esophagitis is worsening. He is taking the liquid flovent as ordered, but feels as though esophagus is worsening. He has allergy appt with Dr Josfea Benjamin next Tuesday. He has eaten almost nothing, and even liquids cause pain.  He is

## 2017-03-23 NOTE — TELEPHONE ENCOUNTER
Patient with recently diagnosed eosinophilic esophagitis. I agree with triage advice given below.   Patient states that fluticasone spray swallowed did not help and in fact made his \"throat close up\" I advised a bland diet, no soy, no lactose, no seafood

## 2017-03-27 ENCOUNTER — OFFICE VISIT (OUTPATIENT)
Dept: FAMILY MEDICINE CLINIC | Facility: CLINIC | Age: 37
End: 2017-03-27

## 2017-03-27 VITALS
SYSTOLIC BLOOD PRESSURE: 143 MMHG | WEIGHT: 233 LBS | DIASTOLIC BLOOD PRESSURE: 87 MMHG | HEART RATE: 87 BPM | BODY MASS INDEX: 34 KG/M2

## 2017-03-27 DIAGNOSIS — K20.0 EOSINOPHILIC ESOPHAGITIS: ICD-10-CM

## 2017-03-27 DIAGNOSIS — J02.9 PHARYNGITIS, UNSPECIFIED ETIOLOGY: Primary | ICD-10-CM

## 2017-03-27 PROCEDURE — 99212 OFFICE O/P EST SF 10 MIN: CPT | Performed by: FAMILY MEDICINE

## 2017-03-27 PROCEDURE — 99214 OFFICE O/P EST MOD 30 MIN: CPT | Performed by: FAMILY MEDICINE

## 2017-03-27 RX ORDER — SUCRALFATE 1 G/1
2 TABLET ORAL 2 TIMES DAILY
Refills: 0 | COMMUNITY
Start: 2017-03-23 | End: 2017-05-25

## 2017-03-27 NOTE — PROGRESS NOTES
\"All day long I feel like my esophagus is closing. \"  On 80 mg of nexium a day. Continues to loose weight. Sees Dr. Yuliet Bowles to get additional treatment. Ulcerative esophagitis   Eosinophilic esophagitis.     Diet has improved no wheat \"I only eat things

## 2017-03-28 ENCOUNTER — TELEPHONE (OUTPATIENT)
Dept: ALLERGY | Facility: CLINIC | Age: 37
End: 2017-03-28

## 2017-03-28 ENCOUNTER — OFFICE VISIT (OUTPATIENT)
Dept: ALLERGY | Facility: CLINIC | Age: 37
End: 2017-03-28

## 2017-03-28 VITALS
BODY MASS INDEX: 34.07 KG/M2 | RESPIRATION RATE: 16 BRPM | HEIGHT: 69 IN | WEIGHT: 230 LBS | DIASTOLIC BLOOD PRESSURE: 68 MMHG | HEART RATE: 72 BPM | SYSTOLIC BLOOD PRESSURE: 110 MMHG

## 2017-03-28 DIAGNOSIS — J30.1 SEASONAL ALLERGIC RHINITIS DUE TO POLLEN: ICD-10-CM

## 2017-03-28 DIAGNOSIS — K20.0 EOSINOPHILIC ESOPHAGITIS: Primary | ICD-10-CM

## 2017-03-28 DIAGNOSIS — J30.89 ALLERGIC RHINITIS DUE TO OTHER ALLERGIC TRIGGER, UNSPECIFIED RHINITIS SEASONALITY: ICD-10-CM

## 2017-03-28 PROCEDURE — 99214 OFFICE O/P EST MOD 30 MIN: CPT | Performed by: ALLERGY & IMMUNOLOGY

## 2017-03-28 PROCEDURE — 99212 OFFICE O/P EST SF 10 MIN: CPT | Performed by: ALLERGY & IMMUNOLOGY

## 2017-03-28 RX ORDER — PREDNISONE 10 MG/1
TABLET ORAL
Qty: 38 TABLET | Refills: 0 | Status: SHIPPED | OUTPATIENT
Start: 2017-03-28 | End: 2017-04-21 | Stop reason: ALTCHOICE

## 2017-03-28 RX ORDER — BUDESONIDE 0.5 MG/2ML
0.5 INHALANT ORAL 2 TIMES DAILY
Qty: 60 AMPULE | Refills: 0 | Status: SHIPPED | OUTPATIENT
Start: 2017-03-28 | End: 2017-04-21

## 2017-03-28 NOTE — PATIENT INSTRUCTIONS
Continue with sucralfate  and Nexium from his gastroenterologist  Start Pulmicort 0.5 mg twice daily swallowed.   May mix with a packet of Splenda or sugar to make a viscus solution to swallow  Start prednisone 40 mg once a day with food ×5 days then taper

## 2017-03-28 NOTE — PROGRESS NOTES
Linda Frey is a 29year old male. HPI:   Patient presents with: Allergies: follow up, does not feel medications are helping. Still with dysphagia, EGD performed, eosinophilic esopagitis.       Patient is a 36-year-old male who presents for fo interim       last pred was 6 months ago for asthma     HISTORY:  Past Medical History   Diagnosis Date   • Asthma      Samples tried   • Hypertension    • Hyperlipidemia    • Nasal polyps    • Chronic sinusitis    • Aspirin sensitivity      ASA Sensitivit EVERY 4 HOURS AS NEEDED FOR WHEEZING Disp: 8.5 g Rfl: 11   Budesonide-Formoterol Fumarate (SYMBICORT) 160-4.5 MCG/ACT Inhalation Aerosol Inhale 2 puffs into the lungs 2 (two) times daily.  Disp: 1 Inhaler Rfl: 3   Levocetirizine Dihydrochloride (XYZAL) 5 MG inspection lungs are clear to auscultation bilaterally normal respiratory effort   Cardiovascular: regular rate and rhythm no murmurs, gallups, or rubs  Abdomen: soft non-tender non-distended  Skin/Hair: no unusual rashes present   Extremities: no edema, c Take 40mg q day x 5 days,then 30mg x 3 days,then 20 mg x 3 days,then10 mg x 3 days with food           Imaging & Referrals:  None     3/28/2017  Francine Perkins MD    If medication samples were provided today, they were provided solely for patient educat

## 2017-03-28 NOTE — TELEPHONE ENCOUNTER
Pt stating that his insurance will not cover rx listed. Pt will need a PA completed. Current Outpatient Prescriptions:  budesonide (PULMICORT) 0.5 MG/2ML Inhalation Suspension Take 2 mL (0.5 mg total) by nebulization 2 (two) times daily.  Disp: 60 ampule

## 2017-03-29 ENCOUNTER — TELEPHONE (OUTPATIENT)
Dept: HEMATOLOGY/ONCOLOGY | Facility: HOSPITAL | Age: 37
End: 2017-03-29

## 2017-03-29 NOTE — TELEPHONE ENCOUNTER
Call reviewed and noted.   Please have patient continue with Flovent 220 µg 2 puffs swallowed twice a day

## 2017-03-29 NOTE — TELEPHONE ENCOUNTER
Returned call, Per Dr Dejuan Chavez, insurance will not authorize, so at this time no CT scan, patient to return for MD visit in couple of months, current appt canceled and patient will call and reschedule.

## 2017-03-29 NOTE — TELEPHONE ENCOUNTER
Pt contacted and informed that medication not covered. Pt informed to continue previous rx of Flovent 220 micrograms 2 puffs swallowed. Per pt he may have thrown away inhaler.  Pt informed that insurance may not pay for new inhaler and he may have to pay ou

## 2017-03-29 NOTE — TELEPHONE ENCOUNTER
Mehreen Doherty is calling and just wondering if Dr. Chad Olsen still wanted him to get the Ct scan because he has not heard back from us regarding the prior authorization and he was just checking the status please advise

## 2017-03-29 NOTE — TELEPHONE ENCOUNTER
Dr. Loulou Jay website and pharmacy contacted and Pulmicort will not be covered by pt's insurance. Per pharmacy out of pocket cost will be over $400. Please advise on further instructions.  Thank you

## 2017-03-30 NOTE — TELEPHONE ENCOUNTER
She was seen by Dr. Scooby Marquez for eosinophilic esophagitis, started on short course of prednisone. Continuing Carafate and Nexium. Also continued on fluticasone/Pulmicort. MRI reviewed.   He is to follow-up with Dr. Angelita Santiago of surgery who ordered the MRI o

## 2017-04-03 ENCOUNTER — TELEPHONE (OUTPATIENT)
Dept: FAMILY MEDICINE CLINIC | Facility: CLINIC | Age: 37
End: 2017-04-03

## 2017-04-03 NOTE — TELEPHONE ENCOUNTER
Ok, may be seen tomorrow. I have little to offer. Patient has 2 specialist working on the case. Will have to be routed to Dr. José Miguel Mortensen or Dr. Angelica Rascon.

## 2017-04-03 NOTE — TELEPHONE ENCOUNTER
Dr. Juliet Burciaga- please see below communication from pt and advise; he is asking why he has to return to Dr. Merlin Garcia, what is cause of symptoms, if gall bladder is causing symptoms; thanks!

## 2017-04-03 NOTE — TELEPHONE ENCOUNTER
Actions Requested: Patient states has not eaten much since Friday due to hard time swallowing, feels like a build up of phlegm at back of throat, also a pain in ribs when breathing in as if \"bruised\", no SOB, no difficulty with breathing; called on-call with plan.

## 2017-04-03 NOTE — TELEPHONE ENCOUNTER
Pt is contacted and he is made aware of below communications from [address]; pt verbalized understanding of these, but he states the following: Dr. Kriss Branch started him on Prednisone 40 mg and is currently taking 30 mg daily; he felt better on this the first 2

## 2017-04-03 NOTE — TELEPHONE ENCOUNTER
PT have stomach ulcers, pt feels like his chest is bruised   Pt is having difficulty swallowing   Pt spoke to On call Gara Sites last night who advised him to make appt with PCP today   Please advise

## 2017-04-04 ENCOUNTER — OFFICE VISIT (OUTPATIENT)
Dept: FAMILY MEDICINE CLINIC | Facility: CLINIC | Age: 37
End: 2017-04-04

## 2017-04-04 ENCOUNTER — HOSPITAL ENCOUNTER (INPATIENT)
Facility: HOSPITAL | Age: 37
LOS: 2 days | Discharge: HOME | DRG: 370 | End: 2017-04-06
Attending: EMERGENCY MEDICINE | Admitting: HOSPITALIST
Payer: MEDICAID

## 2017-04-04 ENCOUNTER — APPOINTMENT (OUTPATIENT)
Dept: CT IMAGING | Facility: HOSPITAL | Age: 37
DRG: 370 | End: 2017-04-04
Attending: INTERNAL MEDICINE
Payer: MEDICAID

## 2017-04-04 ENCOUNTER — TELEPHONE (OUTPATIENT)
Dept: SURGERY | Facility: CLINIC | Age: 37
End: 2017-04-04

## 2017-04-04 VITALS — SYSTOLIC BLOOD PRESSURE: 123 MMHG | TEMPERATURE: 99 F | DIASTOLIC BLOOD PRESSURE: 77 MMHG | HEART RATE: 83 BPM

## 2017-04-04 DIAGNOSIS — K20.90 ESOPHAGITIS: Primary | ICD-10-CM

## 2017-04-04 DIAGNOSIS — K20.0 EOSINOPHILIC ESOPHAGITIS: Primary | ICD-10-CM

## 2017-04-04 DIAGNOSIS — B37.81 CANDIDA ESOPHAGITIS (HCC): ICD-10-CM

## 2017-04-04 DIAGNOSIS — K21.00 GASTROESOPHAGEAL REFLUX DISEASE WITH ESOPHAGITIS: ICD-10-CM

## 2017-04-04 DIAGNOSIS — K81.9 CHOLECYSTITIS: ICD-10-CM

## 2017-04-04 DIAGNOSIS — K20.0 ESOPHAGITIS, EOSINOPHILIC: ICD-10-CM

## 2017-04-04 DIAGNOSIS — R19.8 GLOBUS SENSATION: ICD-10-CM

## 2017-04-04 DIAGNOSIS — Q89.09 SPLEEN ANOMALY: ICD-10-CM

## 2017-04-04 PROCEDURE — 71260 CT THORAX DX C+: CPT

## 2017-04-04 PROCEDURE — 99214 OFFICE O/P EST MOD 30 MIN: CPT | Performed by: FAMILY MEDICINE

## 2017-04-04 PROCEDURE — 70491 CT SOFT TISSUE NECK W/DYE: CPT

## 2017-04-04 PROCEDURE — 99212 OFFICE O/P EST SF 10 MIN: CPT | Performed by: FAMILY MEDICINE

## 2017-04-04 PROCEDURE — 99223 1ST HOSP IP/OBS HIGH 75: CPT | Performed by: HOSPITALIST

## 2017-04-04 RX ORDER — MORPHINE SULFATE 2 MG/ML
1 INJECTION, SOLUTION INTRAMUSCULAR; INTRAVENOUS EVERY 2 HOUR PRN
Status: DISCONTINUED | OUTPATIENT
Start: 2017-04-04 | End: 2017-04-06

## 2017-04-04 RX ORDER — ONDANSETRON 2 MG/ML
4 INJECTION INTRAMUSCULAR; INTRAVENOUS EVERY 6 HOURS PRN
Status: DISCONTINUED | OUTPATIENT
Start: 2017-04-04 | End: 2017-04-06

## 2017-04-04 RX ORDER — BISACODYL 10 MG
10 SUPPOSITORY, RECTAL RECTAL
Status: DISCONTINUED | OUTPATIENT
Start: 2017-04-04 | End: 2017-04-06

## 2017-04-04 RX ORDER — DEXTROSE AND SODIUM CHLORIDE 5; .9 G/100ML; G/100ML
INJECTION, SOLUTION INTRAVENOUS CONTINUOUS
Status: DISCONTINUED | OUTPATIENT
Start: 2017-04-04 | End: 2017-04-06

## 2017-04-04 RX ORDER — METHYLPREDNISOLONE SODIUM SUCCINATE 40 MG/ML
20 INJECTION, POWDER, LYOPHILIZED, FOR SOLUTION INTRAMUSCULAR; INTRAVENOUS ONCE
Status: COMPLETED | OUTPATIENT
Start: 2017-04-04 | End: 2017-04-04

## 2017-04-04 RX ORDER — POLYETHYLENE GLYCOL 3350 17 G/17G
17 POWDER, FOR SOLUTION ORAL DAILY PRN
Status: DISCONTINUED | OUTPATIENT
Start: 2017-04-04 | End: 2017-04-06

## 2017-04-04 RX ORDER — SODIUM PHOSPHATE, DIBASIC AND SODIUM PHOSPHATE, MONOBASIC 7; 19 G/133ML; G/133ML
1 ENEMA RECTAL ONCE AS NEEDED
Status: ACTIVE | OUTPATIENT
Start: 2017-04-04 | End: 2017-04-04

## 2017-04-04 RX ORDER — SODIUM CHLORIDE 9 MG/ML
1000 INJECTION, SOLUTION INTRAVENOUS ONCE
Status: COMPLETED | OUTPATIENT
Start: 2017-04-04 | End: 2017-04-04

## 2017-04-04 RX ORDER — MORPHINE SULFATE 2 MG/ML
2 INJECTION, SOLUTION INTRAMUSCULAR; INTRAVENOUS EVERY 2 HOUR PRN
Status: DISCONTINUED | OUTPATIENT
Start: 2017-04-04 | End: 2017-04-06

## 2017-04-04 RX ORDER — DOCUSATE SODIUM 100 MG/1
100 CAPSULE, LIQUID FILLED ORAL 2 TIMES DAILY
Status: DISCONTINUED | OUTPATIENT
Start: 2017-04-04 | End: 2017-04-06

## 2017-04-04 RX ORDER — MORPHINE SULFATE 4 MG/ML
4 INJECTION, SOLUTION INTRAMUSCULAR; INTRAVENOUS EVERY 2 HOUR PRN
Status: DISCONTINUED | OUTPATIENT
Start: 2017-04-04 | End: 2017-04-06

## 2017-04-04 RX ORDER — SUCRALFATE 1 G/1
TABLET ORAL
Qty: 120 TABLET | Refills: 0 | OUTPATIENT
Start: 2017-04-04 | End: 2017-04-11

## 2017-04-04 RX ORDER — FLUTICASONE PROPIONATE 50 MCG
2 SPRAY, SUSPENSION (ML) NASAL 2 TIMES DAILY
COMMUNITY
End: 2017-06-12

## 2017-04-04 RX ORDER — METHYLPREDNISOLONE SODIUM SUCCINATE 40 MG/ML
20 INJECTION, POWDER, LYOPHILIZED, FOR SOLUTION INTRAMUSCULAR; INTRAVENOUS EVERY 6 HOURS
Status: DISCONTINUED | OUTPATIENT
Start: 2017-04-04 | End: 2017-04-06

## 2017-04-04 RX ORDER — HYDROCODONE BITARTRATE AND ACETAMINOPHEN 5; 325 MG/1; MG/1
1 TABLET ORAL EVERY 8 HOURS PRN
Qty: 20 TABLET | Refills: 0 | Status: SHIPPED | OUTPATIENT
Start: 2017-04-04 | End: 2017-09-25

## 2017-04-04 NOTE — CONSULTS
GI CONSULTATION:  Available medical records reviewed. Patient interviewed and examined. Please see orders and transcription. ( Dictated). Thank you.

## 2017-04-04 NOTE — TELEPHONE ENCOUNTER
Contacted patient. States he saw PCP Dr. Arturo Hillman today in the office and will be admitted to hospital for dehydration.  Patient has not eaten since Friday and is having difficulties drinking as he is having issues swallowing. states Dr. Daphne Johnson spoke w

## 2017-04-04 NOTE — TELEPHONE ENCOUNTER
Pt is having sone issues before gall bladder surgery- asking if  has spoken with Dr Hoyos Goods re his issues

## 2017-04-04 NOTE — TELEPHONE ENCOUNTER
I received a call from Dr. Leonid Oliva, pt cannot swallow despite oral steroids. 2 days: nothing to eat. I advised that he go to ED, may need admission with IV steroids, possible evaluation at [facility]? Jessica Randhawa  Also kika d/w Dr. Tiff Page re: splenic lesions

## 2017-04-04 NOTE — H&P
[facility]    PATIENT'S NAME: Teresa Omid   ATTENDING PHYSICIAN: Maxine Resendiz MD   PATIENT ACCOUNT#:   [id number]    LOCATION:  [place] [address]  MEDICAL RECORD #:   [id number]       YOB: 1980  ADMISSION DATE:       04 MEDICATIONS:  At home, please see medication reconciliation list.    ALLERGIES:  No known drug allergies. FAMILY HISTORY:  Father had prostate cancer and hypertension. Mother had gastroesophageal reflux disease.     SOCIAL HISTORY:  No tobacco, alco Protonix. IV fluids. Pain control. Gastroenterology, Dr. Claudia Martin, was notified. Possible repeat endoscopy and relook at his esophagus. Dr. Tra Monson also from general surgery was notified. Further recommendations to follow.     Dictated By Joan Caicedo

## 2017-04-04 NOTE — ED INITIAL ASSESSMENT (HPI)
Pt sent by dr Nathalie Dodson for eval for dehydration d/t eoe. Pt states he has not been able to eat or drink for days. Pt states he has lost 60 lbs since November.

## 2017-04-04 NOTE — PROGRESS NOTES
\"I feel bruised and swollen under my chest.\"  \"Yesterday was really bad with phlegm in the throat. \"  Hasn't eaten much since Friday. I don't have an appetite. I don't care about me. I feel a little afraid about eating.     \"I feel like always have t

## 2017-04-04 NOTE — PROGRESS NOTES
Pt admitted from ED with issues swallowing/dehydration. Pt alert and oriented. Pt with complaints of pain but refusing meds at this time. Gave water. Admission assessment and navigator completed. No skin issues noted.

## 2017-04-04 NOTE — TELEPHONE ENCOUNTER
/, please see [facility]'s message below, any suggestions would be appreciated.  Thanks    MITRA please assist with booking appt thanks

## 2017-04-04 NOTE — PLAN OF CARE
DISCHARGE PLANNING    • Discharge to home or other facility with appropriate resources Progressing        GASTROINTESTINAL - ADULT    • Maintains adequate nutritional intake (undernourished) Progressing        PAIN - ADULT    • Verbalizes/displays adequate

## 2017-04-04 NOTE — CONSULTS
[facility]    PATIENT'S NAME: Sheeba Benjamin   ATTENDING PHYSICIAN: Bharti Yousif MD   CONSULTING PHYSICIAN: Pippa Gr MD   PATIENT ACCOUNT#:   [id number]    LOCATION:  [address] [place] [address] #:   [id number] the above, in addition asthma, history of cholelithiasis, hyperlipidemia, also splenic nodules as above, and eosinophilic esophagitis. PAST SURGICAL HISTORY:  None. MEDICATIONS:  At home, see medication reconciliation.     ALLERGIES:  No known drug al least possibly hemangiomas versus lymphocele. Small hiatal hernia noted, fecalization of distal ileal contents. H. pylori has been negative. IMPRESSION:    1. Dysphagia and odynophagia and history of eosinophilic esophagitis.   Most likely eosinophil

## 2017-04-05 ENCOUNTER — ANESTHESIA EVENT (OUTPATIENT)
Dept: ENDOSCOPY | Facility: HOSPITAL | Age: 37
DRG: 370 | End: 2017-04-05
Payer: MEDICAID

## 2017-04-05 ENCOUNTER — SURGERY (OUTPATIENT)
Age: 37
End: 2017-04-05

## 2017-04-05 ENCOUNTER — ANESTHESIA (OUTPATIENT)
Dept: ENDOSCOPY | Facility: HOSPITAL | Age: 37
DRG: 370 | End: 2017-04-05
Payer: MEDICAID

## 2017-04-05 PROBLEM — K20.0 EOSINOPHILIC ESOPHAGITIS: Status: ACTIVE | Noted: 2017-04-05

## 2017-04-05 PROBLEM — B37.81 CANDIDA ESOPHAGITIS (HCC): Status: ACTIVE | Noted: 2017-04-05

## 2017-04-05 PROCEDURE — 0DB58ZX EXCISION OF ESOPHAGUS, VIA NATURAL OR ARTIFICIAL OPENING ENDOSCOPIC, DIAGNOSTIC: ICD-10-PCS | Performed by: INTERNAL MEDICINE

## 2017-04-05 PROCEDURE — 99233 SBSQ HOSP IP/OBS HIGH 50: CPT | Performed by: HOSPITALIST

## 2017-04-05 RX ORDER — SODIUM CHLORIDE, SODIUM LACTATE, POTASSIUM CHLORIDE, CALCIUM CHLORIDE 600; 310; 30; 20 MG/100ML; MG/100ML; MG/100ML; MG/100ML
INJECTION, SOLUTION INTRAVENOUS CONTINUOUS PRN
Status: DISCONTINUED | OUTPATIENT
Start: 2017-04-05 | End: 2017-04-05 | Stop reason: SURG

## 2017-04-05 RX ORDER — SODIUM CHLORIDE, SODIUM LACTATE, POTASSIUM CHLORIDE, CALCIUM CHLORIDE 600; 310; 30; 20 MG/100ML; MG/100ML; MG/100ML; MG/100ML
INJECTION, SOLUTION INTRAVENOUS CONTINUOUS
Status: DISCONTINUED | OUTPATIENT
Start: 2017-04-05 | End: 2017-04-05

## 2017-04-05 RX ORDER — NALOXONE HYDROCHLORIDE 0.4 MG/ML
80 INJECTION, SOLUTION INTRAMUSCULAR; INTRAVENOUS; SUBCUTANEOUS AS NEEDED
Status: DISCONTINUED | OUTPATIENT
Start: 2017-04-05 | End: 2017-04-05 | Stop reason: HOSPADM

## 2017-04-05 RX ORDER — FLUCONAZOLE 2 MG/ML
100 INJECTION INTRAVENOUS EVERY 24 HOURS
Status: DISCONTINUED | OUTPATIENT
Start: 2017-04-05 | End: 2017-04-06

## 2017-04-05 RX ORDER — MIDAZOLAM HYDROCHLORIDE 1 MG/ML
INJECTION INTRAMUSCULAR; INTRAVENOUS AS NEEDED
Status: DISCONTINUED | OUTPATIENT
Start: 2017-04-05 | End: 2017-04-05 | Stop reason: SURG

## 2017-04-05 RX ORDER — LIDOCAINE HYDROCHLORIDE 10 MG/ML
INJECTION, SOLUTION EPIDURAL; INFILTRATION; INTRACAUDAL; PERINEURAL AS NEEDED
Status: DISCONTINUED | OUTPATIENT
Start: 2017-04-05 | End: 2017-04-05 | Stop reason: SURG

## 2017-04-05 RX ORDER — SODIUM CHLORIDE, SODIUM LACTATE, POTASSIUM CHLORIDE, CALCIUM CHLORIDE 600; 310; 30; 20 MG/100ML; MG/100ML; MG/100ML; MG/100ML
INJECTION, SOLUTION INTRAVENOUS CONTINUOUS
Status: DISCONTINUED | OUTPATIENT
Start: 2017-04-05 | End: 2017-04-06

## 2017-04-05 RX ADMIN — LIDOCAINE HYDROCHLORIDE 50 MG: 10 INJECTION, SOLUTION EPIDURAL; INFILTRATION; INTRACAUDAL; PERINEURAL at 12:29:00

## 2017-04-05 RX ADMIN — SODIUM CHLORIDE, SODIUM LACTATE, POTASSIUM CHLORIDE, CALCIUM CHLORIDE: 600; 310; 30; 20 INJECTION, SOLUTION INTRAVENOUS at 12:26:00

## 2017-04-05 RX ADMIN — MIDAZOLAM HYDROCHLORIDE 2 MG: 1 INJECTION INTRAMUSCULAR; INTRAVENOUS at 12:29:00

## 2017-04-05 NOTE — H&P
History & Physical Examination    Patient Name: Brendan Short  MRN: [id number]  [facility]: [id number]  YOB: 1980    Diagnosis: eosinophilic esophagitis, severe dysphagia      Prescriptions prior to admission:  sucralfate (CARAFATE) 1 g Oral T nightly. Disp: 90 tablet Rfl: 3 4/3/2017 at Unknown time   Esomeprazole Magnesium (NEXIUM) 20 MG Oral Capsule Delayed Release Take 2 capsules by mouth 30 minutes prior to breakfast and 30 minutes prior to evening meal.  NOTE INSURANCE DOES NOT COVER.  PT HA MG/ML injection 2 mg 2 mg Intravenous Q2H PRN   Or      morphINE sulfate (PF) 4 MG/ML injection 4 mg 4 mg Intravenous Q2H PRN       Allergies:   Atorvastatin                Comment:Other reaction(s): ruq abd pain  Ibuprofen                   Comment:Other

## 2017-04-05 NOTE — OPERATIVE REPORT
[facility]    PATIENT'S NAME: Shea Ezequiel   ATTENDING PHYSICIAN: Miles Bah MD   OPERATING PHYSICIAN: Cesario Dixon MD   PATIENT ACCOUNT#:   [id number]    LOCATION:  [facility] ENDO POOL ROOM 13 [facility] 10  MEDICAL RECORD The distal esophagus did not have any ring formation. There was a small whitish plaque in the distal esophagus consistent with possible Candida esophagitis.   The squamocolumnar junction was at approximately 40 cm as was the esophagogastric junction and th

## 2017-04-05 NOTE — ANESTHESIA PREPROCEDURE EVALUATION
Anesthesia PreOp Note    HPI:     Sheila Davidson is a 29year old male who presents for preoperative consultation requested by: Cinthia Acosta MD    Date of Surgery: 4/4/2017 - 4/5/2017    Procedure(s):  ESOPHAGOGASTRODUODENOSCOPY (EGD) four times daily before meals and at bedtime for one month.    May crush and dissolve tablet in water if difficulty swallowing Disp: 120 tablet Rfl: 0 4/3/2017 at Unknown time   Fluticasone Propionate 50 MCG/ACT Nasal Suspension 2 sprays by Each Nare route capsule Rfl: 1    HYDROcodone-acetaminophen (NORCO) 5-325 MG Oral Tab Take 1 tablet by mouth every 8 (eight) hours as needed for Pain.  Disp: 20 tablet Rfl: 0    budesonide (PULMICORT) 0.5 MG/2ML Inhalation Suspension Take 2 mL (0.5 mg total) by nebulizatio morphINE sulfate (PF) 2 MG/ML injection 2 mg 2 mg Intravenous Q2H PRN Fernando Siddiqui MD    Or        morphINE sulfate (PF) 4 MG/ML injection 4 mg 4 mg Intravenous Q2H PRN Fernando Siddiqui MD      No current Epic-ordered outpatient prescriptions on file 04/05/17  1117   BP: 129/75 131/66 118/52 138/66   Pulse: 72 71 50 86   Temp: 98.1 °F (36.7 °C) 97.4 °F (36.3 °C) 97.7 °F (36.5 °C)    TempSrc: Oral Oral Oral    Resp: 18 18 18 13   Height:       Weight:       SpO2: 97% 99% 97% 97%        Anesthesia ROS/Me

## 2017-04-05 NOTE — BRIEF OP NOTE
[facility] ENDOSCOPY LAB SUITES  Brief Op Note     Bina Aparicio Location: OR   [facility] [id number] MRN [id number]   Admission Date 4/4/2017 Operation Date 4/5/2017   Attending Physician Scott Espinoza MD Operating Physician Misty Davis

## 2017-04-05 NOTE — DIETARY NOTE
ADULT NUTRITION INITIAL ASSESSMENT    Pt is at moderate nutrition risk. Pt does not meet malnutrition criteria. RECOMMENDATIONS TO MD:  Recommendations to MD: order outpatient Nutritional Counseling as appropriate pending pt outcome.      NUTRITION DI 107.956 kg (238 lb)  03/03/17 : 112.855 kg (248 lb 12.8 oz)  02/23/17 : 109.453 kg (241 lb 4.8 oz)  02/09/17 : 108.863 kg (240 lb)  02/08/17 : 108.863 kg (240 lb)      GASTROINTESTINAL PROBLEMS: thrush--r/o candida esophagitis per EGD, dysphagia, heartburn

## 2017-04-05 NOTE — PLAN OF CARE
DISCHARGE PLANNING    • Discharge to home or other facility with appropriate resources Adequate for Discharge        GASTROINTESTINAL - ADULT    • Maintains adequate nutritional intake (undernourished) Adequate for Discharge        PAIN - ADULT    • Verbal

## 2017-04-05 NOTE — PROGRESS NOTES
[facility]    Progress Note    Valerie Schwarz Patient Status:  Inpatient    1980 MRN [id number]   Location [facility] 4W/SW/SE Attending Albert Cheng MD   Hosp Day # 1 PCP Dominique Martinez. DO Rocio       Subjective:    An costophrenic angle bulla unchanged. 6. Mild chronic anterior wedging of T11 and T12 vertebral bodies. Ct Soft Tissue Of Neck(contrast Only) (cpt=70491)    4/4/2017  CONCLUSION:  1. No acute finding. 2. Symmetric enlargement of palatine tonsils.  Ass

## 2017-04-05 NOTE — PLAN OF CARE
GASTROINTESTINAL - ADULT    • Maintains adequate nutritional intake (undernourished) Not Progressing        Patient/Family Goals    • Patient/Family Short Term Goal Not Progressing          DISCHARGE PLANNING    • Discharge to home or other facility with a

## 2017-04-05 NOTE — ANESTHESIA POSTPROCEDURE EVALUATION
Patient: Valerie Schwarz    Procedure Summary     Date Anesthesia Start Anesthesia Stop Room / Location    04/05/17 1226 1252 300 [facility] ENDOSCOPY 05 / 300 [facility] ENDOSCOPY       Procedure Diagnosis Surgeon Responsible Provider    ESOPHAGOGASTRODUODENOSCOPY (EGD) (

## 2017-04-05 NOTE — PROGRESS NOTES
04/04/17 1901   Clinical Encounter Type   Visited With Patient   Surgical Visit Pre-op   Patient Spiritual Encounters   Spiritual Assessment Completed 1   Spiritual Interventions Empathic listening, non-anxious presence   Adaptation to Hospital 4   Fear

## 2017-04-05 NOTE — CONSULTS
[facility]    PATIENT'S NAME: Sylwia Pugh   ATTENDING PHYSICIAN: Floyd Alcala MD   CONSULTING PHYSICIAN: Estee Ryder MD   PATIENT ACCOUNT#:   [id number]    LOCATION:  [address] [place] [address] RECORD #:   [id number]       DATE [facility] related to significant allergies?   The patient now has been admitted with difficulty with swallowing, decreased oral intake, and tightness in the chest.  He indeed has had an endoscopy that was done by Dr. Jian Astudillo on March 10, 2017, and this had shown the pre was normal.      PAST SURGICAL HISTORY:  Negative.     MEDICATIONS:  At home include budesonide, formoterol fumarate, Xyzal, albuterol, Singulair, sucralfate, Nexium, hydrocodone/acetaminophen, fluticasone, Pulmicort, prednisone, lidocaine viscous, vitamin SIGNS:  Afebrile. Vital signs are stable. HEENT:  Head is within normal limits for race and sex. Ophthalmological examination is grossly intact. Pupils are equal and react to light and accommodation normally. External ocular movements are intact.   ENT

## 2017-04-05 NOTE — PLAN OF CARE
Maintains adequate nutritional intake (undernourished) Progressing      Verbalizes/displays adequate comfort level or patient's stated pain goal Progressing      Patient/Family Long Term Goal Progressing      Patient/Family Short Term Goal Progressing

## 2017-04-06 ENCOUNTER — APPOINTMENT (OUTPATIENT)
Dept: HEMATOLOGY/ONCOLOGY | Facility: HOSPITAL | Age: 37
End: 2017-04-06
Attending: INTERNAL MEDICINE
Payer: MEDICAID

## 2017-04-06 VITALS
OXYGEN SATURATION: 94 % | HEIGHT: 69 IN | HEART RATE: 61 BPM | SYSTOLIC BLOOD PRESSURE: 122 MMHG | BODY MASS INDEX: 33.47 KG/M2 | WEIGHT: 226 LBS | DIASTOLIC BLOOD PRESSURE: 74 MMHG | RESPIRATION RATE: 18 BRPM | TEMPERATURE: 98 F

## 2017-04-06 PROCEDURE — 99239 HOSP IP/OBS DSCHRG MGMT >30: CPT | Performed by: HOSPITALIST

## 2017-04-06 RX ORDER — FLUCONAZOLE 100 MG/1
100 TABLET ORAL DAILY
Qty: 14 TABLET | Refills: 0 | Status: SHIPPED | OUTPATIENT
Start: 2017-04-06 | End: 2017-04-20

## 2017-04-06 NOTE — PLAN OF CARE
Patient/Family Goals    • Patient/Family Short Term Goal Not Progressing          DISCHARGE PLANNING    • Discharge to home or other facility with appropriate resources Progressing        GASTROINTESTINAL - ADULT    • Maintains adequate nutritional intake

## 2017-04-06 NOTE — PAYOR COMM NOTE
Review Type: ADMISSION   Reviewer: Aundrea Blake       Date: April 6, 2017 - 12:32 PM  Payor: Gil Sanchez  Authorization Number: [id number]  Admit date: 4/4/2017 10:43 AM   Admitted from Emergency Dept.:         HPI    The patient is a 44-year-old male wi Urobilinogen Urine 2.0 (*)     Ascorbic Acid Urine 40  (*)     All other components within normal limits   BASIC METABOLIC PANEL (8) - Abnormal; Notable for the following:     BUN 24 (*)     BUN/CREA Ratio 23.1 (*)     All other components within normal li Unknown    [facility]ida esophagitis ([facility] B37.81 4/5/2017 Unknown    Eosinophilic esophagitis T06.6 4/5/2017 Unknown            History & Physical Examination    HISTORY AND PHYSICAL EXAMINATION    CHIEF COMPLAINT:  Severe ulcerative eosinophilic esophagitis.  allergies. FAMILY HISTORY:  Father had prostate cancer and hypertension.  Mother had gastroesophageal reflux disease.     SOCIAL HISTORY:  No tobacco, alcohol, or drug use.  He lives with his family.  He is usually independent in his basic activities of notified.  Possible repeat endoscopy and relook at his esophagus.  Dr. Viridiana James also from general surgery was notified.  Further recommendations to follow.

## 2017-04-06 NOTE — ED PROVIDER NOTES
Patient Seen in: [facility] 4w/sw/se    History   No chief complaint on file.     Stated Complaint: EOE disease, sent here by doctor taya ROBERTS    The patient is a 78-year-old male with a past history of asthma, recently diagnosed eosinophilic eso day.   Esomeprazole Magnesium (NEXIUM) 40 MG Oral Capsule Delayed Release,  Take 1 capsule (40 mg total) by mouth daily. Patient taking differently: Take 40 mg by mouth daily.  tAKES 2 TABLETS    Ipratropium Bromide 0.06 % Nasal Solution,  2 sprays by Tennie Masker Comment: Girlfriend smokes outside    Alcohol Use: No                Review of Systems    Positive for stated complaint: EOE disease, sent here by doctor taya  Other systems are as noted in HPI. Constitutional and vital signs reviewed. (7) - Abnormal; Notable for the following:     AST 45 (*)      (*)     Bilirubin, Total 1.3 (*)     All other components within normal limits   BASIC METABOLIC PANEL (8) - Abnormal; Notable for the following:     Glucose 136 (*)     All other compon WITH PLATELET.   Procedure                               Abnormality         Status                     ---------                               -----------         ------                     CBC W/ DIFFERENTIAL[[id number]]          Abnormal            Final on Admission  Date Reviewed: 4/5/2017          ICD-10-CM Noted POA    * (Principal)Esophagitis K20.9 3/10/2017 Unknown    Candida esophagitis (HCC) B37.81 4/5/2017 Unknown    Eosinophilic esophagitis I86.9 4/5/2017 Unknown

## 2017-04-06 NOTE — PROGRESS NOTES
[facility] [address]     Gastroenterology Progress Note    Rigo Olivo Patient Status:  Inpatient    1980 MRN [id number]   Location [facility] 4W/SW/SE Attending Sofya Mayer MD   Hosp Day # 2 PCP Castillo Calderon.  Rocio, heat intolerance. Genitourinary: Positive for frequency and all other systems negative. Negative for dysuria and hematuria. Musculoskeletal: Negative for back pain and joint pain.    Neurological: Negative for dizziness, tremors, seizures, syncope, weak is no hepatosplenomegaly. There is no tenderness. There is no CVA tenderness. No hernia. Abdomen soft, nondistended, nontender. Musculoskeletal: Normal range of motion. He exhibits no tenderness or effusion.    Lymphadenopathy:     He has no cervical ad (cpt=70491)    4/4/2017  CONCLUSION:  1. No acute finding. 2. Symmetric enlargement of palatine tonsils. Associated airway narrowing is exaggerated by elevation of tongue and palate during exam. 3. Mild chronic ethmoid and maxillary sinusitis.  4. Prior gra

## 2017-04-07 ENCOUNTER — TELEPHONE (OUTPATIENT)
Dept: GASTROENTEROLOGY | Facility: CLINIC | Age: 37
End: 2017-04-07

## 2017-04-07 ENCOUNTER — TELEPHONE (OUTPATIENT)
Dept: FAMILY MEDICINE CLINIC | Facility: CLINIC | Age: 37
End: 2017-04-07

## 2017-04-07 ENCOUNTER — TELEPHONE (OUTPATIENT)
Dept: INTERNAL MEDICINE UNIT | Facility: HOSPITAL | Age: 37
End: 2017-04-07

## 2017-04-07 DIAGNOSIS — K20.90 ESOPHAGITIS: Primary | ICD-10-CM

## 2017-04-07 NOTE — TELEPHONE ENCOUNTER
Pt was just discharged from the [facility] and was referred to a dietitian Waldo Machado   Pt stts he saw his pcp last week already   Pt is asking to have a referral   Please advise

## 2017-04-07 NOTE — TELEPHONE ENCOUNTER
Please schedule patient for a hospital follow up appointment with pcp . Pt was discharged on 4/6/17 .

## 2017-04-07 NOTE — TELEPHONE ENCOUNTER
Pt contacted and he accepted appt with Dr. Vonda Stokes on 4/11/17 @ 3:45pm directions provided to the [facility].  He wanted the biopsy results of the endoscopy, so I reviewed the letter generated by Dr. Vonda Stokes on 3/20/17, he stated he now remembered this was already disc

## 2017-04-11 ENCOUNTER — OFFICE VISIT (OUTPATIENT)
Dept: GASTROENTEROLOGY | Facility: CLINIC | Age: 37
End: 2017-04-11

## 2017-04-11 VITALS
WEIGHT: 227 LBS | BODY MASS INDEX: 34.01 KG/M2 | HEART RATE: 74 BPM | SYSTOLIC BLOOD PRESSURE: 123 MMHG | HEIGHT: 68.5 IN | DIASTOLIC BLOOD PRESSURE: 72 MMHG

## 2017-04-11 DIAGNOSIS — K20.0 EOSINOPHILIC ESOPHAGITIS: Primary | ICD-10-CM

## 2017-04-11 DIAGNOSIS — B37.81 CANDIDA ESOPHAGITIS (HCC): ICD-10-CM

## 2017-04-11 DIAGNOSIS — R13.12 OROPHARYNGEAL DYSPHAGIA: ICD-10-CM

## 2017-04-11 PROCEDURE — 99214 OFFICE O/P EST MOD 30 MIN: CPT | Performed by: INTERNAL MEDICINE

## 2017-04-11 PROCEDURE — 99212 OFFICE O/P EST SF 10 MIN: CPT | Performed by: INTERNAL MEDICINE

## 2017-04-11 NOTE — PROGRESS NOTES
HPI:    Patient ID: Miller Zeng is a 29year old male. HPI    Review of Systems   Constitutional: Negative for fever, chills, diaphoresis, activity change, appetite change, fatigue and unexpected weight change.    HENT: Positive for trouble swal Take 500 mg by mouth daily. Disp:  Rfl:    Vitamin B-12 1000 MCG Oral Tab Take 5,000 mcg by mouth every other day. Disp:  Rfl:    Ketotifen Fumarate (ALAWAY) 0.025 % Ophthalmic Solution Place 2 drops into the right eye 2 (two) times daily.  Disp:  Rfl:    E Physical Exam   Constitutional: He is oriented to person, place, and time. He appears well-developed and well-nourished. No distress. HENT:   Head: Normocephalic and atraumatic. Mouth/Throat: Oropharynx is clear and moist. No oropharyngeal exudate.

## 2017-04-11 NOTE — H&P
History of present illness: This is a 46-year-old male patient of DR. Philippe Resides with eosinophilic esophagitis. He had severe symptoms requiring prednisone treatment as well as Pulmicort, use omeprazole 40 mg twice daily and Carafate.   Then last w

## 2017-04-11 NOTE — PATIENT INSTRUCTIONS
1.  Continue esomeprazole 40 mg twice daily ×1 month then 40 mg every morning before breakfast.    2.  Continue Carafate 1 g 4 times daily. 3.  Continue prednisone until completed    4. Continue Pulmicort     5.   Return visit in 6 months

## 2017-04-13 ENCOUNTER — TELEPHONE (OUTPATIENT)
Dept: ALLERGY | Facility: CLINIC | Age: 37
End: 2017-04-13

## 2017-04-13 ENCOUNTER — OFFICE VISIT (OUTPATIENT)
Dept: ALLERGY | Facility: CLINIC | Age: 37
End: 2017-04-13

## 2017-04-13 VITALS
WEIGHT: 225 LBS | TEMPERATURE: 98 F | HEIGHT: 68 IN | BODY MASS INDEX: 34.1 KG/M2 | DIASTOLIC BLOOD PRESSURE: 74 MMHG | HEART RATE: 72 BPM | SYSTOLIC BLOOD PRESSURE: 112 MMHG | RESPIRATION RATE: 20 BRPM

## 2017-04-13 DIAGNOSIS — J30.89 ALLERGIC RHINITIS DUE TO OTHER ALLERGIC TRIGGER, UNSPECIFIED RHINITIS SEASONALITY: ICD-10-CM

## 2017-04-13 DIAGNOSIS — R09.81 NASAL CONGESTION: ICD-10-CM

## 2017-04-13 DIAGNOSIS — J45.40 EXTRINSIC ASTHMA, MODERATE PERSISTENT, UNCOMPLICATED: ICD-10-CM

## 2017-04-13 DIAGNOSIS — J34.3 HYPERTROPHY OF NASAL TURBINATES: ICD-10-CM

## 2017-04-13 DIAGNOSIS — K20.0 EOSINOPHILIC ESOPHAGITIS: Primary | ICD-10-CM

## 2017-04-13 PROCEDURE — 99212 OFFICE O/P EST SF 10 MIN: CPT | Performed by: ALLERGY & IMMUNOLOGY

## 2017-04-13 PROCEDURE — 99214 OFFICE O/P EST MOD 30 MIN: CPT | Performed by: ALLERGY & IMMUNOLOGY

## 2017-04-13 PROCEDURE — 94010 BREATHING CAPACITY TEST: CPT | Performed by: ALLERGY & IMMUNOLOGY

## 2017-04-13 RX ORDER — LEVOCETIRIZINE DIHYDROCHLORIDE 5 MG/1
5 TABLET, FILM COATED ORAL EVERY EVENING
Qty: 30 TABLET | Refills: 5 | Status: SHIPPED | OUTPATIENT
Start: 2017-04-13 | End: 2018-07-24

## 2017-04-13 RX ORDER — BUDESONIDE AND FORMOTEROL FUMARATE DIHYDRATE 80; 4.5 UG/1; UG/1
2 AEROSOL RESPIRATORY (INHALATION) 2 TIMES DAILY
Qty: 1 INHALER | Refills: 2 | Status: SHIPPED | OUTPATIENT
Start: 2017-04-13 | End: 2017-09-25

## 2017-04-13 NOTE — TELEPHONE ENCOUNTER
Spoke with patient regarding Thedora Palacio is not covered by his insurance and that [facility] has Xyzal for a better price and to check it out.  Patient states he knows that Thedora Palacio is not covered and will go to [facility] to price medication and thanks our office f

## 2017-04-13 NOTE — PROGRESS NOTES
Sheila Davidson is a 29year old male. HPI:   No chief complaint on file. Patient is a 41-year-old male who presents for follow-up with a chief complaint of allergies    Patient last seen by me on March 28, 2017.   See recommendations from last v February 2017 showed a white cell count of 9900 with 4% eosinophils. A recent CBC on April 4, 2017 showed 0% eosinophils    Today patient reports he was hospitalized x 1 week in April.     D/irma home last Friday  Able to eat better now  On Carafate, mexium Girlfriend smokes outside    Alcohol Use: No                 Medications (Active prior to today's visit):    Current Outpatient Prescriptions:  fluconazole 100 MG Oral Tab Take 1 tablet (100 mg total) by mouth daily.  Disp: 14 tablet Rfl: 0   HYDROcodone-ac 0   Lidocaine Viscous 2 % Mouth/Throat Solution Take 5 mL by mouth 4 (four) times daily as needed for Pain.  Disp: 100 mL Rfl: 1   Budesonide-Formoterol Fumarate (SYMBICORT) 160-4.5 MCG/ACT Inhalation Aerosol Inhale 2 puffs into the lungs 2 (two) times kraig Extremities: no edema, cyanosis, or clubbing     ASSESSMENT/PLAN:   Assessment  Eosinophilic esophagitis  (primary encounter diagnosis)  Extrinsic asthma, moderate persistent, uncomplicated  Allergic rhinitis due to other allergic trigger, unspecified rh 4/13/2017  Khadijah Rao MD    If medication samples were provided today, they were provided solely for patient education and training related to self administration of these medications.   Teaching, instruction and sample was provided to the jaime

## 2017-04-20 ENCOUNTER — TELEPHONE (OUTPATIENT)
Dept: FAMILY MEDICINE CLINIC | Facility: CLINIC | Age: 37
End: 2017-04-20

## 2017-04-20 ENCOUNTER — TELEPHONE (OUTPATIENT)
Dept: GASTROENTEROLOGY | Facility: CLINIC | Age: 37
End: 2017-04-20

## 2017-04-20 NOTE — TELEPHONE ENCOUNTER
Pt returned my call. Did not end up going to ER for evaluation. He states bleeding has improved since yesterday. Not a lot of blood noted today. Does note constipation since discharged from hospital.  Thinks it is from sucralfate started at discharge.

## 2017-04-20 NOTE — TELEPHONE ENCOUNTER
Correction to below--Forwarded to Dr Melania Nicole office on call. Please see all below and advise. Thanks.

## 2017-04-20 NOTE — TELEPHONE ENCOUNTER
Actions Requested: Sign off Dr Gm Lyles  Situation/Background   Problem: hemorrhoids   Onset: 2 days ago   Associated Symptoms: Pt states he has had active bleeding for past 2 days because hemorrhoid burst, states he has not been able to get blood to stop.  Stat

## 2017-04-20 NOTE — TELEPHONE ENCOUNTER
Rectal pain and bleeding - would advise ER evaluation to check hgb and have someone examine the area/ abscess

## 2017-04-20 NOTE — TELEPHONE ENCOUNTER
LMTCB. Routed to [facility] PA since ESB out of office. FM RN spoke with pt and recommended ER for evaluation for rectal bleeding. Hemorrhoid \"burst\" and pt unable to stop bleeding. Pt was started on sucralfate and esomeprazole for esophagitis.  EoE

## 2017-04-20 NOTE — TELEPHONE ENCOUNTER
Patient states the hemorrhoid is bleeding it came threw his pants/it is has been for 2 days.  He is saying it burst  Patient thinks he has a fever now

## 2017-04-20 NOTE — TELEPHONE ENCOUNTER
Pt transferred. He states it feels like a ball in the rectal area, but since it \"burst\", and the bleeding started, the pain is mild now. He is at [phone]. I texted and called Alexsander Melo, await call back.   Per below, if I am unable to reach in a few min

## 2017-04-20 NOTE — TELEPHONE ENCOUNTER
When I called pt back, he stated he had no more pain, and the bleeding had stopped. He wants to be seen in office rather than ER. I spoke to Dr Emma Nevarez and he will see pt tomorrow --I added pt, arrival 1:30pm and given directions to [facility] DIA.  Jeffrey Van is gone, so

## 2017-04-20 NOTE — TELEPHONE ENCOUNTER
Attempted to c/b patient x 2. Needed more information whether or not patient should be seen in ER, et Shelia Elias. Do not want to miss abscess or thrombosed hemorrhoid. Would likely state if patient does not feel anything in the rectum, how bad is his pain?

## 2017-04-21 ENCOUNTER — TELEPHONE (OUTPATIENT)
Dept: GASTROENTEROLOGY | Facility: CLINIC | Age: 37
End: 2017-04-21

## 2017-04-21 ENCOUNTER — OFFICE VISIT (OUTPATIENT)
Dept: GASTROENTEROLOGY | Facility: CLINIC | Age: 37
End: 2017-04-21

## 2017-04-21 VITALS
HEART RATE: 91 BPM | SYSTOLIC BLOOD PRESSURE: 119 MMHG | WEIGHT: 228.19 LBS | BODY MASS INDEX: 33.8 KG/M2 | DIASTOLIC BLOOD PRESSURE: 74 MMHG | TEMPERATURE: 99 F | HEIGHT: 69 IN

## 2017-04-21 DIAGNOSIS — K64.5 THROMBOSED EXTERNAL HEMORRHOID: Primary | ICD-10-CM

## 2017-04-21 PROCEDURE — 99212 OFFICE O/P EST SF 10 MIN: CPT | Performed by: INTERNAL MEDICINE

## 2017-04-21 PROCEDURE — 99213 OFFICE O/P EST LOW 20 MIN: CPT | Performed by: INTERNAL MEDICINE

## 2017-04-21 RX ORDER — BUDESONIDE AND FORMOTEROL FUMARATE DIHYDRATE 160; 4.5 UG/1; UG/1
AEROSOL RESPIRATORY (INHALATION)
COMMUNITY
Start: 2017-03-27 | End: 2017-04-21 | Stop reason: DRUGHIGH

## 2017-04-21 NOTE — TELEPHONE ENCOUNTER
Rns for Dr. Guido Resendiz - Pt is in GI office with Dr. Oneil Gar for [address] Dr. Oneil Gar requesting for Dr. Guido Resendiz to see pt today or Monday if possible. Pt has thrombosed h'roid that needs to be lanced. Pls phone pt directly to set up. Thank you!        Unable to

## 2017-04-21 NOTE — PROGRESS NOTES
Gage Woo is a 29year old male.     HPI:   Patient presents with:  Rectal Bleeding  Hemorrhoids  Constipation  Diarrhea: for the last 2 days      The patient is a 55-year-old male with a history of eosinophilic esophagitis, gastric ulcers who al Inhaler Rfl: 2   HYDROcodone-acetaminophen (NORCO) 5-325 MG Oral Tab Take 1 tablet by mouth every 8 (eight) hours as needed for Pain.  Disp: 20 tablet Rfl: 0   Fluticasone Propionate 50 MCG/ACT Nasal Suspension 2 sprays by Each Nare route 2 (two) times kraig °F (36.9 °C), height 5' 9\" (1.753 m), weight 228 lb 3.2 oz (103.511 kg).     The patient appears their stated age and is in no acute distress  HEENT- anicteric sclera, neck no lymphadnopathy, OP- clear with no masses or lesions  Chest- Clear bilaterally, n

## 2017-04-21 NOTE — PATIENT INSTRUCTIONS
Thrombosed hemorrhoid  - sitz baths  - anusol HC cream twice a day  - stool softener - Miralax (avoid straining )   - get plenty of fluids    - surgical input    - Follow up with Dr. Rudolph Hoff in 2- 4weeks

## 2017-04-21 NOTE — TELEPHONE ENCOUNTER
Can schedule patient to see Dr. Xochilt Jiang at 4:00 PM on 04/24/2017. [facility], 2nd floor, yellow parking lot, [address].

## 2017-04-24 NOTE — DISCHARGE SUMMARY
[facility]    Discharge Summary    Sharron Rodriguez [facility] Patient Status:  Inpatient    1980 MRN [id number]   Location [facility] 4W/SW/SE Attending No att. providers found   Hosp Day # 2 PCP Gaston Michaud.  Rocio,      Date of A Physician     Brittany Mirza MD Consulting Physician     Josselyn Solares MD Consulting Physician         Surgical Procedures     Case IDs Date Procedure Surgeon Location Status    [id number] 4/5/17 ESOPHAGOGASTRODUODENOSCOPY (EGD) Ryan Pacheco Ipratropium Bromide 0.06 % Soln   Commonly known as:  ATROVENT        2 sprays by Nasal route 3 (three) times daily as needed for Rhinitis.     Quantity:  1 Bottle   Refills:  5       SINGULAIR 10 MG Tabs   Generic drug:  Montelukast Sodium        Take 1

## 2017-05-22 ENCOUNTER — TELEPHONE (OUTPATIENT)
Dept: GASTROENTEROLOGY | Facility: CLINIC | Age: 37
End: 2017-05-22

## 2017-05-23 RX ORDER — BUDESONIDE 3 MG/1
9 CAPSULE, COATED PELLETS ORAL EVERY MORNING
Qty: 168 CAPSULE | Refills: 0 | Status: SHIPPED | OUTPATIENT
Start: 2017-05-23 | End: 2017-09-25

## 2017-05-23 NOTE — TELEPHONE ENCOUNTER
Dr Trina Milian contacted. Last week had several diarrhea stools daily, now down to 1-2 daily, but has cramping. His difficulties with swallowing returned last week. He has eosinophilic esophagitis. Is out of Carafate.  Takes Nexium 40mg daily but doesn't seem

## 2017-05-24 ENCOUNTER — TELEPHONE (OUTPATIENT)
Dept: GASTROENTEROLOGY | Facility: CLINIC | Age: 37
End: 2017-05-24

## 2017-05-24 NOTE — TELEPHONE ENCOUNTER
Pt. States that he was not able to get his medication, due to prior auth needed with [facility]. Pt. Is calling to f/up on auth.

## 2017-05-24 NOTE — TELEPHONE ENCOUNTER
Please call in prescription to patient's pharmacy for budesonide as written (it could not be prescribed).

## 2017-05-24 NOTE — TELEPHONE ENCOUNTER
Rx called in with read back to Kamila Angela from [facility] as requested per Dr. Sulaiman Simental. States will be calling pt when rx is ready for . No further action required.

## 2017-05-24 NOTE — TELEPHONE ENCOUNTER
Current Outpatient Prescriptions:  Budesonide 3 MG Oral Cap DR Particles Take 3 capsules (9 mg total) by mouth every morning.  Take 3 caps q AM for 4 weeks, then 2 caps q AM for 4 weeks, then 1 cap q AMfor 4 weeks, then stop Disp: 168 capsule Rfl: 0     P

## 2017-05-24 NOTE — TELEPHONE ENCOUNTER
[facility]. DIarrhea R19.7.  Await determination

## 2017-05-25 RX ORDER — SUCRALFATE 1 G/1
1 TABLET ORAL EVERY 6 HOURS
Qty: 120 TABLET | Refills: 6 | Status: SHIPPED | OUTPATIENT
Start: 2017-05-25 | End: 2018-12-11 | Stop reason: ALTCHOICE

## 2017-05-25 NOTE — TELEPHONE ENCOUNTER
Received fax back from [employer] that budesonide was approved. #90/30 DAYS. Approved for 3 months.     Tracking ID: [id number]    [id number] contacted to inform    Fax sent for scanning    Pt notified

## 2017-05-25 NOTE — TELEPHONE ENCOUNTER
Pt now asking if he should have continued Carafate. Last rx I see from Dr Marquis Vick is 4/4/17 x 1. No refills on this medication    Office notes from 4/11/17 note to continue on Carafate 1 g 4 times daily.  Pended above for sign off if needed

## 2017-05-26 NOTE — TELEPHONE ENCOUNTER
Carafate prescription refilled. Please have patient make a return appointment to see me in 3 months.

## 2017-06-12 RX ORDER — FLUTICASONE PROPIONATE 50 MCG
SPRAY, SUSPENSION (ML) NASAL
Qty: 3 INHALER | Refills: 3 | Status: SHIPPED | OUTPATIENT
Start: 2017-06-12 | End: 2017-09-25

## 2017-06-30 ENCOUNTER — TELEPHONE (OUTPATIENT)
Dept: GASTROENTEROLOGY | Facility: CLINIC | Age: 37
End: 2017-06-30

## 2017-06-30 NOTE — TELEPHONE ENCOUNTER
Pt states he is still having reflux and is having problems with swallowing. Pt is still taking nexium twice daily but not feeling any better. Please call. [facility] not in office.

## 2017-08-10 NOTE — TELEPHONE ENCOUNTER
Pt calling and stts he want the name brand not the generic. SINGULAIR 10 MG Oral Tab 90 tablet 3 7/23/2016     Sig - Route: Take 1 tablet (10 mg total) by mouth nightly.  - Oral    E-Prescribing Status: Receipt confirmed by pharmacy (7/23/2016 10:50 AM CDT

## 2017-08-12 ENCOUNTER — TELEPHONE (OUTPATIENT)
Dept: FAMILY MEDICINE CLINIC | Facility: CLINIC | Age: 37
End: 2017-08-12

## 2017-08-12 NOTE — TELEPHONE ENCOUNTER
Pt is requesting refills, Pt will like a call to confirm when its been send. Current Outpatient Prescriptions:  SINGULAIR 10 MG Oral Tab Take 1 tablet (10 mg total) by mouth nightly.  Disp: 90 tablet Rfl: 3

## 2017-08-15 RX ORDER — MONTELUKAST SODIUM 10 MG/1
TABLET, FILM COATED ORAL
Qty: 30 TABLET | Refills: 2 | Status: SHIPPED | OUTPATIENT
Start: 2017-08-15 | End: 2017-09-25

## 2017-08-15 NOTE — TELEPHONE ENCOUNTER
Refill Protocol Appointment Criteria: Refilled per protocol    · Appointment scheduled in the past 12 months or in the next 3 months  Recent Outpatient Visits            3 months ago Thrombosed external hemorrhoid    [address], [address] [place] [address]

## 2017-09-25 ENCOUNTER — OFFICE VISIT (OUTPATIENT)
Dept: FAMILY MEDICINE CLINIC | Facility: CLINIC | Age: 37
End: 2017-09-25

## 2017-09-25 VITALS
WEIGHT: 253.63 LBS | TEMPERATURE: 98 F | DIASTOLIC BLOOD PRESSURE: 92 MMHG | HEIGHT: 69 IN | BODY MASS INDEX: 37.56 KG/M2 | HEART RATE: 84 BPM | OXYGEN SATURATION: 95 % | SYSTOLIC BLOOD PRESSURE: 144 MMHG

## 2017-09-25 DIAGNOSIS — H02.59 FLOPPY EYELID SYNDROME: ICD-10-CM

## 2017-09-25 DIAGNOSIS — I10 ESSENTIAL HYPERTENSION: ICD-10-CM

## 2017-09-25 DIAGNOSIS — K20.0 EOSINOPHILIC ESOPHAGITIS: ICD-10-CM

## 2017-09-25 DIAGNOSIS — J45.30 MILD PERSISTENT ASTHMA WITHOUT COMPLICATION: ICD-10-CM

## 2017-09-25 DIAGNOSIS — G44.229 CHRONIC TENSION-TYPE HEADACHE, NOT INTRACTABLE: Primary | ICD-10-CM

## 2017-09-25 DIAGNOSIS — G47.33 OBSTRUCTIVE SLEEP APNEA SYNDROME: ICD-10-CM

## 2017-09-25 PROCEDURE — 99212 OFFICE O/P EST SF 10 MIN: CPT | Performed by: FAMILY MEDICINE

## 2017-09-25 PROCEDURE — 99215 OFFICE O/P EST HI 40 MIN: CPT | Performed by: FAMILY MEDICINE

## 2017-09-25 RX ORDER — LOSARTAN POTASSIUM 50 MG/1
50 TABLET ORAL DAILY
Qty: 90 TABLET | Refills: 3 | Status: SHIPPED | OUTPATIENT
Start: 2017-09-25 | End: 2018-09-25

## 2017-09-25 RX ORDER — FLUTICASONE PROPIONATE 50 MCG
SPRAY, SUSPENSION (ML) NASAL
Qty: 3 BOTTLE | Refills: 3 | Status: SHIPPED | OUTPATIENT
Start: 2017-09-25 | End: 2018-02-03

## 2017-09-25 RX ORDER — MONTELUKAST SODIUM 10 MG/1
TABLET, FILM COATED ORAL
Qty: 90 TABLET | Refills: 1 | Status: SHIPPED | OUTPATIENT
Start: 2017-09-25 | End: 2018-02-03

## 2017-09-25 RX ORDER — ESOMEPRAZOLE MAGNESIUM 40 MG/1
40 CAPSULE, DELAYED RELEASE ORAL 2 TIMES DAILY
Qty: 180 CAPSULE | Refills: 1 | Status: SHIPPED | OUTPATIENT
Start: 2017-09-25 | End: 2018-02-03

## 2017-09-25 NOTE — PROGRESS NOTES
On steroids and Gained 28 lbs. Eosinophilic esophagitis. \"My stomach has been a lot better. \"  Infrequent stuck feeling. bp high again with the weight gain      Patient's past medical surgical family social history was reviewed.     Review of Systems Losartan Potassium 50 MG Oral Tab; Take 1 tablet (50 mg total) by mouth daily. Dispense: 90 tablet; Refill: 3  - ASSAY, THYROID STIM HORMONE; Future  - COMP METABOLIC PANEL (14); Future  - CBC WITH DIFFERENTIAL WITH PLATELET; Future  - LIPID PANEL;  Future

## 2017-09-26 ENCOUNTER — APPOINTMENT (OUTPATIENT)
Dept: LAB | Age: 37
End: 2017-09-26
Attending: FAMILY MEDICINE
Payer: COMMERCIAL

## 2017-09-26 ENCOUNTER — LAB ENCOUNTER (OUTPATIENT)
Dept: LAB | Age: 37
End: 2017-09-26
Attending: FAMILY MEDICINE
Payer: COMMERCIAL

## 2017-09-26 ENCOUNTER — HOSPITAL ENCOUNTER (OUTPATIENT)
Dept: GENERAL RADIOLOGY | Age: 37
Discharge: HOME | End: 2017-09-26
Attending: FAMILY MEDICINE
Payer: COMMERCIAL

## 2017-09-26 DIAGNOSIS — I10 ESSENTIAL HYPERTENSION: ICD-10-CM

## 2017-09-26 LAB
ALBUMIN SERPL BCP-MCNC: 4.1 G/DL (ref 3.5–4.8)
ALBUMIN/GLOB SERPL: 1.5 {RATIO} (ref 1–2)
ALP SERPL-CCNC: 49 U/L (ref 32–100)
ALT SERPL-CCNC: 27 U/L (ref 17–63)
ANION GAP SERPL CALC-SCNC: 7 MMOL/L (ref 0–18)
AST SERPL-CCNC: 23 U/L (ref 15–41)
BASOPHILS # BLD: 0.1 K/UL (ref 0–0.2)
BASOPHILS NFR BLD: 1 %
BILIRUB SERPL-MCNC: 0.9 MG/DL (ref 0.3–1.2)
BUN SERPL-MCNC: 17 MG/DL (ref 8–20)
BUN/CREAT SERPL: 21 (ref 10–20)
CALCIUM SERPL-MCNC: 9 MG/DL (ref 8.5–10.5)
CHLORIDE SERPL-SCNC: 105 MMOL/L (ref 95–110)
CHOLEST SERPL-MCNC: 202 MG/DL (ref 110–200)
CO2 SERPL-SCNC: 27 MMOL/L (ref 22–32)
CREAT SERPL-MCNC: 0.81 MG/DL (ref 0.5–1.5)
EOSINOPHIL # BLD: 0.3 K/UL (ref 0–0.7)
EOSINOPHIL NFR BLD: 4 %
ERYTHROCYTE [DISTWIDTH] IN BLOOD BY AUTOMATED COUNT: 13.1 % (ref 11–15)
GLOBULIN PLAS-MCNC: 2.7 G/DL (ref 2.5–3.7)
GLUCOSE SERPL-MCNC: 86 MG/DL (ref 70–99)
HCT VFR BLD AUTO: 45.5 % (ref 41–52)
HDLC SERPL-MCNC: 31 MG/DL
HGB BLD-MCNC: 15 G/DL (ref 13.5–17.5)
LDLC SERPL CALC-MCNC: 127 MG/DL (ref 0–99)
LYMPHOCYTES # BLD: 2.9 K/UL (ref 1–4)
LYMPHOCYTES NFR BLD: 32 %
MCH RBC QN AUTO: 27.4 PG (ref 27–32)
MCHC RBC AUTO-ENTMCNC: 33 G/DL (ref 32–37)
MCV RBC AUTO: 83 FL (ref 80–100)
MONOCYTES # BLD: 0.6 K/UL (ref 0–1)
MONOCYTES NFR BLD: 6 %
NEUTROPHILS # BLD AUTO: 5.2 K/UL (ref 1.8–7.7)
NEUTROPHILS NFR BLD: 57 %
NONHDLC SERPL-MCNC: 171 MG/DL
OSMOLALITY UR CALC.SUM OF ELEC: 289 MOSM/KG (ref 275–295)
PLATELET # BLD AUTO: 244 K/UL (ref 140–400)
PMV BLD AUTO: 7.8 FL (ref 7.4–10.3)
POTASSIUM SERPL-SCNC: 3.9 MMOL/L (ref 3.3–5.1)
PROT SERPL-MCNC: 6.8 G/DL (ref 5.9–8.4)
RBC # BLD AUTO: 5.48 M/UL (ref 4.5–5.9)
SODIUM SERPL-SCNC: 139 MMOL/L (ref 136–144)
TRIGL SERPL-MCNC: 218 MG/DL (ref 1–149)
TSH SERPL-ACNC: 3.22 UIU/ML (ref 0.45–5.33)
WBC # BLD AUTO: 9.1 K/UL (ref 4–11)

## 2017-09-26 PROCEDURE — 80061 LIPID PANEL: CPT

## 2017-09-26 PROCEDURE — 93005 ELECTROCARDIOGRAM TRACING: CPT

## 2017-09-26 PROCEDURE — 93010 ELECTROCARDIOGRAM REPORT: CPT | Performed by: FAMILY MEDICINE

## 2017-09-26 PROCEDURE — 85025 COMPLETE CBC W/AUTO DIFF WBC: CPT

## 2017-09-26 PROCEDURE — 36415 COLL VENOUS BLD VENIPUNCTURE: CPT

## 2017-09-26 PROCEDURE — 71020 XR CHEST PA + LAT CHEST (CPT=71020): CPT | Performed by: FAMILY MEDICINE

## 2017-09-26 PROCEDURE — 80053 COMPREHEN METABOLIC PANEL: CPT

## 2017-09-26 PROCEDURE — 84443 ASSAY THYROID STIM HORMONE: CPT

## 2017-10-02 ENCOUNTER — TELEPHONE (OUTPATIENT)
Dept: FAMILY MEDICINE CLINIC | Facility: CLINIC | Age: 37
End: 2017-10-02

## 2017-10-02 NOTE — TELEPHONE ENCOUNTER
[facility], please advise on CXR results.

## 2017-10-03 ENCOUNTER — TELEPHONE (OUTPATIENT)
Dept: INTERNAL MEDICINE CLINIC | Facility: CLINIC | Age: 37
End: 2017-10-03

## 2017-10-03 NOTE — TELEPHONE ENCOUNTER
Spoke with patient (identified name and date of birth), results reviewed and patient agrees with plan. Notes Recorded by Feliciano Stover DO on 9/26/2017 at 5:50 PM CDT  cxr showed asthma  Cbc normal  ekg normal  Rest blood pending.     Notes Record

## 2017-10-04 NOTE — TELEPHONE ENCOUNTER
PA for Esomeprazole magnesium 40 mg cap completed with EPS via CMM response time 24-72 hours KEY [id number].

## 2017-10-10 NOTE — TELEPHONE ENCOUNTER
Fax received from [facility] stating esomepra mag cap 40mg twice daily is denied. It is not fda approved for daily dose of 80mg.

## 2017-10-11 RX ORDER — ESOMEPRAZOLE MAGNESIUM 40 MG/1
40 CAPSULE, DELAYED RELEASE ORAL
Qty: 60 CAPSULE | Refills: 0 | Status: SHIPPED | OUTPATIENT
Start: 2017-10-11 | End: 2018-02-03

## 2017-10-13 NOTE — TELEPHONE ENCOUNTER
I spoke to Yasmani Hauser at [facility] [phone] regarding a PA for pt's Esomeprazole. She said we shoud hear back from the pharmacy in 24-48 hours. PA was done over the phone. Member Id# [id number].       Esomeprazole Magnesium (NEXIUM) 40 MG

## 2017-10-16 NOTE — TELEPHONE ENCOUNTER
Response to PA medication request received from [address] [place] [address]. PA approved for Nexium. Pt notified. Pharmacy notified. Copay is \"0\" dollars.   Approval letter sent to scanning

## 2017-10-31 ENCOUNTER — TELEPHONE (OUTPATIENT)
Dept: INTERNAL MEDICINE CLINIC | Facility: CLINIC | Age: 37
End: 2017-10-31

## 2017-11-01 NOTE — TELEPHONE ENCOUNTER
PA approved effective 10/31/2017 for 365 days [address] [place] [address] notified of the approval.

## 2018-01-17 ENCOUNTER — OFFICE VISIT (OUTPATIENT)
Dept: FAMILY MEDICINE CLINIC | Facility: CLINIC | Age: 38
End: 2018-01-17

## 2018-01-17 VITALS
BODY MASS INDEX: 36.58 KG/M2 | HEART RATE: 86 BPM | SYSTOLIC BLOOD PRESSURE: 145 MMHG | TEMPERATURE: 99 F | WEIGHT: 247 LBS | DIASTOLIC BLOOD PRESSURE: 80 MMHG | HEIGHT: 69 IN

## 2018-01-17 DIAGNOSIS — I10 ESSENTIAL HYPERTENSION: ICD-10-CM

## 2018-01-17 DIAGNOSIS — J06.9 VIRAL UPPER RESPIRATORY TRACT INFECTION: Primary | ICD-10-CM

## 2018-01-17 PROCEDURE — 99213 OFFICE O/P EST LOW 20 MIN: CPT | Performed by: NURSE PRACTITIONER

## 2018-01-18 NOTE — PROGRESS NOTES
HPI  Pt here for headache, congestion, fatigue and sinus congestion for past 5 days. Has had a little wheezing in chest. Denies fever; slight body aches. No flu shot this year.       Review of Systems   Constitutional: Negative for activity change and fev EVERY 4 HOURS AS NEEDED FOR WHEEZING Disp: 8.5 g Rfl: 11   Esomeprazole Magnesium 40 MG Oral Capsule Delayed Release Take 1 capsule (40 mg total) by mouth 2 (two) times daily before meals.  Disp: 60 capsule Rfl: 0   Fluticasone Propionate 50 MCG/ACT Nasal S Mouth/Throat: Uvula is midline and mucous membranes are normal. Posterior oropharyngeal erythema present. No oropharyngeal exudate, posterior oropharyngeal edema or tonsillar abscesses.    Eyes: Conjunctivae are normal. Pupils are equal, round, and reacti

## 2018-02-03 ENCOUNTER — OFFICE VISIT (OUTPATIENT)
Dept: FAMILY MEDICINE CLINIC | Facility: CLINIC | Age: 38
End: 2018-02-03

## 2018-02-03 VITALS
DIASTOLIC BLOOD PRESSURE: 77 MMHG | SYSTOLIC BLOOD PRESSURE: 131 MMHG | BODY MASS INDEX: 35 KG/M2 | HEART RATE: 86 BPM | TEMPERATURE: 99 F | WEIGHT: 238 LBS

## 2018-02-03 DIAGNOSIS — J32.0 CHRONIC MAXILLARY SINUSITIS: Primary | ICD-10-CM

## 2018-02-03 DIAGNOSIS — K20.0 EOSINOPHILIC ESOPHAGITIS: ICD-10-CM

## 2018-02-03 DIAGNOSIS — J45.30 MILD PERSISTENT ASTHMA WITHOUT COMPLICATION: ICD-10-CM

## 2018-02-03 LAB
FLUAV + FLUBV RNA SPEC NAA+PROBE: NEGATIVE

## 2018-02-03 PROCEDURE — 99214 OFFICE O/P EST MOD 30 MIN: CPT | Performed by: FAMILY MEDICINE

## 2018-02-03 PROCEDURE — 99212 OFFICE O/P EST SF 10 MIN: CPT | Performed by: FAMILY MEDICINE

## 2018-02-03 RX ORDER — FLUTICASONE PROPIONATE 50 MCG
SPRAY, SUSPENSION (ML) NASAL
Qty: 3 BOTTLE | Refills: 3 | Status: SHIPPED | OUTPATIENT
Start: 2018-02-03 | End: 2018-12-11

## 2018-02-03 RX ORDER — PREDNISONE 20 MG/1
20 TABLET ORAL 2 TIMES DAILY
Qty: 10 TABLET | Refills: 0 | Status: SHIPPED | OUTPATIENT
Start: 2018-02-03 | End: 2018-02-08

## 2018-02-03 RX ORDER — MONTELUKAST SODIUM 10 MG/1
TABLET, FILM COATED ORAL
Qty: 90 TABLET | Refills: 3 | Status: SHIPPED | OUTPATIENT
Start: 2018-02-03 | End: 2018-06-05

## 2018-02-03 RX ORDER — ESOMEPRAZOLE MAGNESIUM 40 MG/1
40 CAPSULE, DELAYED RELEASE ORAL 2 TIMES DAILY
Qty: 180 CAPSULE | Refills: 1 | Status: SHIPPED | OUTPATIENT
Start: 2018-02-03 | End: 2018-07-16

## 2018-02-03 RX ORDER — ALBUTEROL SULFATE 90 UG/1
AEROSOL, METERED RESPIRATORY (INHALATION)
Qty: 8.5 G | Refills: 11 | Status: SHIPPED | OUTPATIENT
Start: 2018-02-03 | End: 2018-05-27

## 2018-02-03 RX ORDER — AZITHROMYCIN 250 MG/1
TABLET, FILM COATED ORAL
Qty: 6 TABLET | Refills: 0 | Status: SHIPPED | OUTPATIENT
Start: 2018-02-03 | End: 2018-02-08

## 2018-02-03 NOTE — PROGRESS NOTES
Pt with congestion   Had fever  No n/v  Chest pressure with cough    Where: nasal congestion and cough  Quality (Constant/Sharp?): sharp  Severity: mild mod pain  Duration: 13 days  Timing: constant  When does it occur: day and night.   Modifying factors:

## 2018-02-05 ENCOUNTER — TELEPHONE (OUTPATIENT)
Dept: FAMILY MEDICINE CLINIC | Facility: CLINIC | Age: 38
End: 2018-02-05

## 2018-02-06 NOTE — TELEPHONE ENCOUNTER
Spoke with patient and relayed Influenza results below--patient verbalizes understanding. Patient states \"I'm a lot better now. I just wanted the results because I have a child and pregnant wife at home. \"    Please sign off/any other recommendations?

## 2018-02-06 NOTE — TELEPHONE ENCOUNTER
I will inform pt  Influenza test was negative. pls sign off    LMTCB please transfer to triage. Thanks    Does pt want to know about the Influenza? Everything was negative    .   Ref Range & Units 2/3/18 10:48 AM    Influenza A by PCR Negative  Nega

## 2018-02-07 ENCOUNTER — TELEPHONE (OUTPATIENT)
Dept: OTHER | Age: 38
End: 2018-02-07

## 2018-02-07 NOTE — TELEPHONE ENCOUNTER
Pt calling for symptom update. Pt saw [facility] 4 days ago. Pt states today is the last day for prednisone and antibiotics, states still has a lot of post nasal drip and throat is irritating. Pt reports mild body aches. Denies fever.  Negative flu and rsv t

## 2018-02-08 RX ORDER — AZITHROMYCIN 250 MG/1
TABLET, FILM COATED ORAL
Qty: 6 TABLET | Refills: 0 | Status: SHIPPED | OUTPATIENT
Start: 2018-02-08 | End: 2018-07-16

## 2018-02-08 RX ORDER — PREDNISONE 20 MG/1
20 TABLET ORAL 2 TIMES DAILY
Qty: 10 TABLET | Refills: 0 | Status: SHIPPED | OUTPATIENT
Start: 2018-02-08 | End: 2018-02-13

## 2018-02-12 NOTE — TELEPHONE ENCOUNTER
[facility] and was informed pt picked up both meds on 2/8/18. Closing encounter.

## 2018-02-14 ENCOUNTER — TELEPHONE (OUTPATIENT)
Dept: FAMILY MEDICINE CLINIC | Facility: CLINIC | Age: 38
End: 2018-02-14

## 2018-02-14 NOTE — TELEPHONE ENCOUNTER
Patient states when tried to refill     Albuterol Sulfate HFA (PROAIR HFA) 108 (90 Base) MCG/ACT Inhalation Aero Soln INHALE 2 PUFFS INTO THE LUNGS EVERY 4 HOURS AS NEEDED FOR WHEEZING Disp: 8.5 g Rfl: 6     Being told needs prior Auth.  Patient states was

## 2018-02-15 NOTE — TELEPHONE ENCOUNTER
Called [facility] to verify ProAir is not covered by insurance.  Pharmacist Magaly Chowdary states that is correct; insurance would probably cover Ventolin but states pt was giving them a hard time yesterday that Ventolin does not work for him and he needs the [employer]

## 2018-02-16 NOTE — TELEPHONE ENCOUNTER
PA for Proair  mcg/act completed with [employer] via [employer] response time 3-5 business days [facility].

## 2018-02-19 NOTE — TELEPHONE ENCOUNTER
Proair HFA approved by insurance effective 2/15/2018-2/15/2019; patient and [facility] pharmacist notified of the approval.

## 2018-03-25 DIAGNOSIS — J45.30 MILD PERSISTENT ASTHMA WITHOUT COMPLICATION: ICD-10-CM

## 2018-03-27 RX ORDER — MONTELUKAST SODIUM 10 MG/1
TABLET, FILM COATED ORAL
Qty: 30 TABLET | Refills: 0 | OUTPATIENT
Start: 2018-03-27

## 2018-03-27 NOTE — TELEPHONE ENCOUNTER
.  Pending Prescriptions Disp Refills    SINGULAIR 10 MG Oral Tab [Pharmacy Med Name: Singulair Oral Tablet 10 MG] 30 tablet 0     Sig: TAKE 1 TABLET BY MOUTH NIGHTLY         LR 2-3-18 # 90 +3      Spoke with richard from [facility] & stated to pls disregard

## 2018-04-27 ENCOUNTER — NURSE TRIAGE (OUTPATIENT)
Dept: OTHER | Age: 38
End: 2018-04-27

## 2018-04-27 NOTE — TELEPHONE ENCOUNTER
Ov appt made for tomorrow 18 @ 1010am with Dr. Vanessa Hu as per protocol. Pt calling (Name and  verified) with c/o intermittent productive cough, mild SOB on exertion and nasal congestion x 2 days.     Pt states that he has Asthma and allergies

## 2018-04-28 ENCOUNTER — OFFICE VISIT (OUTPATIENT)
Dept: FAMILY MEDICINE CLINIC | Facility: CLINIC | Age: 38
End: 2018-04-28

## 2018-04-28 VITALS
BODY MASS INDEX: 37.84 KG/M2 | DIASTOLIC BLOOD PRESSURE: 82 MMHG | WEIGHT: 255.5 LBS | SYSTOLIC BLOOD PRESSURE: 134 MMHG | HEIGHT: 69 IN | HEART RATE: 74 BPM

## 2018-04-28 DIAGNOSIS — E78.2 MIXED HYPERLIPIDEMIA: Primary | ICD-10-CM

## 2018-04-28 PROCEDURE — 99214 OFFICE O/P EST MOD 30 MIN: CPT | Performed by: FAMILY MEDICINE

## 2018-04-28 PROCEDURE — 99212 OFFICE O/P EST SF 10 MIN: CPT | Performed by: FAMILY MEDICINE

## 2018-04-28 RX ORDER — PREDNISONE 20 MG/1
20 TABLET ORAL 2 TIMES DAILY
Qty: 10 TABLET | Refills: 0 | Status: SHIPPED | OUTPATIENT
Start: 2018-04-28 | End: 2018-05-05

## 2018-04-28 NOTE — PROGRESS NOTES
Blood pressure 134/82, pulse 74, height 5' 9\" (1.753 m), weight 255 lb 8 oz (115.9 kg). Presents today 1 week history of intermittent cough some dyspnea coughing up clear phlegm with clear nasal congestion eyes are itchy does not wear contact lenses.

## 2018-04-30 ENCOUNTER — LAB ENCOUNTER (OUTPATIENT)
Dept: LAB | Age: 38
End: 2018-04-30
Attending: FAMILY MEDICINE
Payer: MEDICAID

## 2018-04-30 DIAGNOSIS — E78.2 MIXED HYPERLIPIDEMIA: ICD-10-CM

## 2018-04-30 PROCEDURE — 36415 COLL VENOUS BLD VENIPUNCTURE: CPT

## 2018-04-30 PROCEDURE — 84460 ALANINE AMINO (ALT) (SGPT): CPT

## 2018-04-30 PROCEDURE — 83721 ASSAY OF BLOOD LIPOPROTEIN: CPT

## 2018-04-30 PROCEDURE — 84450 TRANSFERASE (AST) (SGOT): CPT

## 2018-04-30 PROCEDURE — 80061 LIPID PANEL: CPT

## 2018-04-30 PROCEDURE — 80048 BASIC METABOLIC PNL TOTAL CA: CPT

## 2018-04-30 PROCEDURE — 84443 ASSAY THYROID STIM HORMONE: CPT

## 2018-05-03 ENCOUNTER — TELEPHONE (OUTPATIENT)
Dept: FAMILY MEDICINE CLINIC | Facility: CLINIC | Age: 38
End: 2018-05-03

## 2018-05-03 NOTE — TELEPHONE ENCOUNTER
Notes recorded by Belinda Trimble MA on 5/3/2018 at 9:57 AM CDT  LMTCB-Please transferred to ext [phone].

## 2018-05-03 NOTE — TELEPHONE ENCOUNTER
Pt returned call and informed of results as stated below by [facility]. Pt verbalized understanding/compliance.

## 2018-05-03 NOTE — TELEPHONE ENCOUNTER
----- Message from Lauren Williamson DO sent at 5/2/2018  1:27 PM CDT -----  LDL AND TRIGLYCERIDES ELEVATED. WEIGHT LOSS ADVISED.

## 2018-05-29 NOTE — TELEPHONE ENCOUNTER
Refill Protocol Appointment Criteria  · Appointment scheduled in the past 6 months or in the next 3 months  Recent Outpatient Visits            1 month ago Mixed hyperlipidemia    [facility] [address], [address], DO    Office Visi

## 2018-05-31 ENCOUNTER — TELEPHONE (OUTPATIENT)
Dept: OTHER | Age: 38
End: 2018-05-31

## 2018-05-31 NOTE — TELEPHONE ENCOUNTER
Patient states he has new insurance effective 6/1/2018 [employer] and needs a PA for [employer]. Patient states he will call back to provide the ID# for the insurance which is required to proceed with the PA.

## 2018-06-01 ENCOUNTER — TELEPHONE (OUTPATIENT)
Dept: OTHER | Age: 38
End: 2018-06-01

## 2018-06-01 ENCOUNTER — TELEPHONE (OUTPATIENT)
Dept: FAMILY MEDICINE CLINIC | Facility: CLINIC | Age: 38
End: 2018-06-01

## 2018-06-01 DIAGNOSIS — J45.30 MILD PERSISTENT ASTHMA WITHOUT COMPLICATION: ICD-10-CM

## 2018-06-01 RX ORDER — MONTELUKAST SODIUM 10 MG/1
TABLET, FILM COATED ORAL
Qty: 30 TABLET | Refills: 0 | OUTPATIENT
Start: 2018-06-01

## 2018-06-01 NOTE — TELEPHONE ENCOUNTER
Per pharmacist patient needs a refill on the Singulair 10 mg tab but patient request the generic due to high cost of brand.

## 2018-06-01 NOTE — TELEPHONE ENCOUNTER
Plan states no PA is required medication is listed on the formulary. [place] pharmacy notified, patient notified.

## 2018-06-01 NOTE — TELEPHONE ENCOUNTER
Pt called in regards to   SINGULAIR 10 MG Oral Tab 90 tablet 3 2/3/2018     Sig: TAKE 1 TABLET BY MOUTH NIGHTLY.     Notes to Pharmacy: Dispense name brand not generic per patient request    E-Prescribing Status: Receipt confirmed by pharmacy (2/3/2018 10:4

## 2018-06-02 NOTE — TELEPHONE ENCOUNTER
Dr [facility],    See message below. Permission to switch singulair to generic? It is pended for md review.      Refill Protocol Appointment Criteria  · Appointment scheduled in the past 6 months or in the next 3 months  Recent Outpatient Visits            1 month

## 2018-06-05 RX ORDER — MONTELUKAST SODIUM 10 MG/1
10 TABLET ORAL NIGHTLY
Qty: 90 TABLET | Refills: 3 | Status: SHIPPED | OUTPATIENT
Start: 2018-06-05 | End: 2019-05-26

## 2018-06-06 ENCOUNTER — TELEPHONE (OUTPATIENT)
Dept: FAMILY MEDICINE CLINIC | Facility: CLINIC | Age: 38
End: 2018-06-06

## 2018-06-08 NOTE — TELEPHONE ENCOUNTER
PA for Esomeprazole Magnesium 40 mg cap completed with EPS via CMM response time 24-72 hours KEY [id number].

## 2018-06-12 ENCOUNTER — TELEPHONE (OUTPATIENT)
Dept: FAMILY MEDICINE CLINIC | Facility: CLINIC | Age: 38
End: 2018-06-12

## 2018-06-12 NOTE — TELEPHONE ENCOUNTER
Pt said he only takes Nexium once daily    Said his ins will cover one time daily-but  Will not cover 2 times daily    Requesting new rx- confirmed [place]/

## 2018-06-12 NOTE — TELEPHONE ENCOUNTER
Spoke with Rhianna Hodge from the pharmacy who reports patient's insurance will not cover Nexium to be taken BID and a prior auth needs to be initiated.

## 2018-06-18 RX ORDER — ESOMEPRAZOLE MAGNESIUM 40 MG/1
40 CAPSULE, DELAYED RELEASE ORAL
Qty: 90 CAPSULE | Refills: 3 | Status: SHIPPED | OUTPATIENT
Start: 2018-06-18 | End: 2018-08-18

## 2018-06-18 NOTE — TELEPHONE ENCOUNTER
Dr Ananda Schneider,    I spoke to the pt and he states he now has [employer] [place] [employer] [place]. [employer] [place] will cover the Esomeprazole at once a day. BID dosing would need an appeal. The pt has been taking once a day    Please advise.

## 2018-06-18 NOTE — TELEPHONE ENCOUNTER
I actually called [place] and spoke  with [place] in [place]. She states the pt has been inactive(terminated) from BC/BS since April of this year. I tried to contact the pt.  I left a message for him to call me back

## 2018-06-18 NOTE — TELEPHONE ENCOUNTER
Dr Vonda Stokes,    Esomeprazole is covered at one capsule a day.      Please advise if pt should be on twice daily, if so we would need to appeal the esomeprazole    Please advise

## 2018-07-05 NOTE — PROGRESS NOTES
Visit Note    Active Problems   Calculus of gallbladder with chronic cholecystitis without obstruction  (primary encounter diagnosis)  Chief Complaint: Patient presents with:  Gallbladder: Patient referred by Dr. Miller Champagne for gallstones and right upper Ipratropium Bromide 0.06 % Nasal Solution 2 sprays by Nasal route 3 (three) times daily as needed for Rhinitis. Disp: 1 Bottle Rfl: 1   Levocetirizine Dihydrochloride (XYZAL) 5 MG Oral Tab Take 1 tablet (5 mg total) by mouth every evening.  Disp: 30 table in stool. Endocrine: Negative. Genitourinary: Negative. Negative for difficulty urinating. Musculoskeletal: Negative. Skin: Negative. Allergic/Immunologic: Negative. Neurological: Negative. Hematological: Negative.     Psychiatric/Behavi 2/03/2017 at 15:21            =====  CONCLUSION:   1. Contracted gallbladder with shadowing consistent with multiple gallstones. Nonspecific sonographic appearance suggestive of chronic cholecystitis.   2. No demonstrated wall thickening or pericholecystic Satisfactory Ref Range: 0.3-1.2 mg/dL 0.9    Bilirubin, Direct Latest Ref Range: 0.0-0.2 mg/dL 0.1    TOTAL PROTEIN Latest Ref Range: 5.9-8.4 g/dL 7.3    Albumin Latest Ref Range: 3.5-4.8 g/dL 4.0    Amylase, Serum Latest Ref Range:  U/L 40    Lipase, Serum Lates bleeding/Hematoma/ seroma, need for transfusion, Injury to nearby organs/bowel/Bile duct, Bile Leaks, need for drains, ERCP, pancreatitis, PE, cardiorespiratory events, Pain acute or Chronic, risks of anesthesia, open surgery, reoperation, etc. Due to bola

## 2018-07-16 ENCOUNTER — OFFICE VISIT (OUTPATIENT)
Dept: FAMILY MEDICINE CLINIC | Facility: CLINIC | Age: 38
End: 2018-07-16

## 2018-07-16 VITALS
SYSTOLIC BLOOD PRESSURE: 160 MMHG | TEMPERATURE: 99 F | DIASTOLIC BLOOD PRESSURE: 90 MMHG | HEART RATE: 82 BPM | WEIGHT: 255 LBS | BODY MASS INDEX: 38 KG/M2

## 2018-07-16 DIAGNOSIS — J01.00 ACUTE MAXILLARY SINUSITIS, RECURRENCE NOT SPECIFIED: Primary | ICD-10-CM

## 2018-07-16 PROCEDURE — 99213 OFFICE O/P EST LOW 20 MIN: CPT | Performed by: FAMILY MEDICINE

## 2018-07-16 PROCEDURE — 99212 OFFICE O/P EST SF 10 MIN: CPT | Performed by: FAMILY MEDICINE

## 2018-07-16 RX ORDER — AMOXICILLIN AND CLAVULANATE POTASSIUM 875; 125 MG/1; MG/1
1 TABLET, FILM COATED ORAL 2 TIMES DAILY
Qty: 14 TABLET | Refills: 0 | Status: SHIPPED | OUTPATIENT
Start: 2018-07-16 | End: 2018-07-24 | Stop reason: ALTCHOICE

## 2018-07-16 NOTE — PROGRESS NOTES
HPI:    Patient ID: Sidra Maher is a 41year old male. HPI  Patient presents with:  Cough: c/o cough for 2 weeks    Review of Systems   Constitutional: Negative. HENT: Positive for congestion. Respiratory: Positive for cough. RASH, SWELLING    Comment:Facial swelling   PHYSICAL EXAM:   Physical Exam   Constitutional: He appears well-developed. HENT:   Head: Normocephalic.    Right Ear: Tympanic membrane normal.   Left Ear: Tympanic membrane normal.   Nose: Rhinorrhea pres

## 2018-07-24 ENCOUNTER — NURSE TRIAGE (OUTPATIENT)
Dept: FAMILY MEDICINE CLINIC | Facility: CLINIC | Age: 38
End: 2018-07-24

## 2018-07-24 DIAGNOSIS — R09.81 NASAL CONGESTION: ICD-10-CM

## 2018-07-24 DIAGNOSIS — J34.3 HYPERTROPHY OF NASAL TURBINATES: ICD-10-CM

## 2018-07-24 RX ORDER — LEVOCETIRIZINE DIHYDROCHLORIDE 5 MG/1
5 TABLET, FILM COATED ORAL EVERY EVENING
Qty: 30 TABLET | Refills: 5 | Status: SHIPPED | OUTPATIENT
Start: 2018-07-24 | End: 2018-12-11

## 2018-07-24 RX ORDER — LEVOFLOXACIN 500 MG/1
500 TABLET, FILM COATED ORAL DAILY
Qty: 7 TABLET | Refills: 0 | Status: SHIPPED | OUTPATIENT
Start: 2018-07-24 | End: 2018-08-03

## 2018-07-24 NOTE — TELEPHONE ENCOUNTER
Advised patient on Dr. Sloane Herrera two scripts, name, dose, frequency. Patient verbalized understanding, had no questions. Advised to call back if needed.

## 2018-07-24 NOTE — TELEPHONE ENCOUNTER
Dr. Velia Bell:  Patient saw you 7/16/18. Is asking if different treatment can be given. Patient finished augmentin. Still continues with sinus pressure, post nasal drip,  and coughing up phlegm. Has headaches, no ear pain. No fever, no sob.      Hx of a

## 2018-08-18 ENCOUNTER — OFFICE VISIT (OUTPATIENT)
Dept: FAMILY MEDICINE CLINIC | Facility: CLINIC | Age: 38
End: 2018-08-18
Payer: COMMERCIAL

## 2018-08-18 VITALS
HEART RATE: 74 BPM | HEIGHT: 69 IN | TEMPERATURE: 98 F | SYSTOLIC BLOOD PRESSURE: 129 MMHG | DIASTOLIC BLOOD PRESSURE: 79 MMHG | WEIGHT: 249 LBS | BODY MASS INDEX: 36.88 KG/M2

## 2018-08-18 DIAGNOSIS — L03.011 FELON OF FINGER OF RIGHT HAND: Primary | ICD-10-CM

## 2018-08-18 PROCEDURE — 26011 DRAINAGE OF FINGER ABSCESS: CPT | Performed by: FAMILY MEDICINE

## 2018-08-18 RX ORDER — ACETAMINOPHEN AND CODEINE PHOSPHATE 300; 30 MG/1; MG/1
1 TABLET ORAL AS NEEDED
Refills: 0 | COMMUNITY
Start: 2018-08-11 | End: 2018-12-11 | Stop reason: ALTCHOICE

## 2018-08-18 RX ORDER — ESOMEPRAZOLE MAGNESIUM 40 MG/1
40 CAPSULE, DELAYED RELEASE ORAL
Qty: 90 CAPSULE | Refills: 3 | Status: SHIPPED | OUTPATIENT
Start: 2018-08-18 | End: 2018-12-11

## 2018-08-18 RX ORDER — PENICILLIN V POTASSIUM 500 MG/1
1 TABLET ORAL
COMMUNITY
Start: 2018-08-11 | End: 2018-12-11 | Stop reason: ALTCHOICE

## 2018-08-18 NOTE — PROGRESS NOTES
Rt middle finger  Swollen green under nail   Had for 1 week  Is on penicillen for tooth issue    Exam  Swelling medial aspect rt middle finger  Redness and pus seen    A/p  Felon    Needs I and d  Timeout performed.   Informed consent was obtained patient's

## 2018-08-20 ENCOUNTER — NURSE ONLY (OUTPATIENT)
Dept: FAMILY MEDICINE CLINIC | Facility: CLINIC | Age: 38
End: 2018-08-20
Payer: COMMERCIAL

## 2018-08-20 DIAGNOSIS — L03.011 FELON OF FINGER OF RIGHT HAND: Primary | ICD-10-CM

## 2018-08-20 PROCEDURE — 99211 OFF/OP EST MAY X REQ PHY/QHP: CPT | Performed by: FAMILY MEDICINE

## 2018-08-20 NOTE — PROGRESS NOTES
Pt presents to clinic for RN visit for finger wound check. Pt identified using name and . Gauze dressing removed and finger visualized. Dried blood and healing incision to ulnar side noted. CDI, free from redness, swelling or drainage.   Pt reports m

## 2018-09-17 ENCOUNTER — TELEPHONE (OUTPATIENT)
Dept: OTHER | Age: 38
End: 2018-09-17

## 2018-09-17 RX ORDER — ALBUTEROL SULFATE 90 UG/1
AEROSOL, METERED RESPIRATORY (INHALATION)
Qty: 6.7 G | Refills: 5 | Status: SHIPPED | OUTPATIENT
Start: 2018-09-17 | End: 2018-10-08

## 2018-09-17 NOTE — TELEPHONE ENCOUNTER
Spoke with [place] pharmacist Rhianna Hodge and patient has filled Proair three times during the month of August; last refill was three days ago.   Pharmacist wanted to know has patient been on any long acting asthma medications since he has been using the short acting

## 2018-09-17 NOTE — TELEPHONE ENCOUNTER
Patient states that he got one refill on his Proair because he lost the cannister. Patient states he has used it a little more than normal because of the weather and allergies which he states he had already discussed with Dr. Daphne Johnson.  He had been on [employer]

## 2018-09-18 NOTE — TELEPHONE ENCOUNTER
Pt called and states pharmacy told him no rx for Proventil received. Called pharmacy who states they did find the escribe for Proventil. Tech states they will notify pt when ready.

## 2018-09-19 ENCOUNTER — TELEPHONE (OUTPATIENT)
Dept: OTHER | Age: 38
End: 2018-09-19

## 2018-09-19 DIAGNOSIS — J45.41 MODERATE PERSISTENT ASTHMA WITH ACUTE EXACERBATION: Primary | ICD-10-CM

## 2018-09-19 NOTE — TELEPHONE ENCOUNTER
CSS called and stts that pt called re Proventil rx and needing a PA. No messages re a PA found in chart. Pt hung up before CSS could transfer. I called [place] and spoke to [facility], needs a PA. Call [phone].

## 2018-09-20 NOTE — TELEPHONE ENCOUNTER
PA for Proventil  mcg/act inhaler completed with EPS via CMM response time 24-72 hours KEY [id number]. Called patient left voice message informing him PA has been completed, PA nurse will contact him once decision from insurance is received.

## 2018-09-21 NOTE — TELEPHONE ENCOUNTER
Spoke with [place] pharmacist Randa Baig and informed PA was denied for quanties greater than 3 per month. Per pharmacist patient filled inhaler on 8/17/2018, 8/25/2018, and 9/13/2018. Insurance will only pay for two short acting inhalers per month. Please advise.

## 2018-09-24 NOTE — TELEPHONE ENCOUNTER
Spoke with patient informed that his insurance will not pay for three inhalers per month they only allow up to two. Advised per [facility] to f/up with pulmonology, telephone number provided to patient.

## 2018-09-25 ENCOUNTER — OFFICE VISIT (OUTPATIENT)
Dept: FAMILY MEDICINE CLINIC | Facility: CLINIC | Age: 38
End: 2018-09-25
Payer: COMMERCIAL

## 2018-09-25 VITALS
BODY MASS INDEX: 37.33 KG/M2 | WEIGHT: 252 LBS | DIASTOLIC BLOOD PRESSURE: 80 MMHG | SYSTOLIC BLOOD PRESSURE: 145 MMHG | HEIGHT: 69 IN | TEMPERATURE: 98 F | HEART RATE: 83 BPM

## 2018-09-25 DIAGNOSIS — I10 ESSENTIAL HYPERTENSION: ICD-10-CM

## 2018-09-25 DIAGNOSIS — J30.89 SEASONAL ALLERGIC RHINITIS DUE TO OTHER ALLERGIC TRIGGER: ICD-10-CM

## 2018-09-25 DIAGNOSIS — J45.20 MILD INTERMITTENT ASTHMA IN ADULT WITHOUT COMPLICATION: ICD-10-CM

## 2018-09-25 DIAGNOSIS — L20.82 FLEXURAL ECZEMA: Primary | ICD-10-CM

## 2018-09-25 PROCEDURE — 99214 OFFICE O/P EST MOD 30 MIN: CPT | Performed by: FAMILY MEDICINE

## 2018-09-25 PROCEDURE — 99212 OFFICE O/P EST SF 10 MIN: CPT | Performed by: FAMILY MEDICINE

## 2018-09-25 RX ORDER — LOSARTAN POTASSIUM 50 MG/1
TABLET ORAL
Qty: 90 TABLET | Refills: 0 | Status: SHIPPED | OUTPATIENT
Start: 2018-09-25 | End: 2018-12-11

## 2018-09-25 RX ORDER — MOMETASONE FUROATE 1 MG/G
1 CREAM TOPICAL 2 TIMES DAILY PRN
Qty: 60 G | Refills: 0 | Status: SHIPPED | OUTPATIENT
Start: 2018-09-25 | End: 2018-12-11 | Stop reason: ALTCHOICE

## 2018-09-25 RX ORDER — PREDNISONE 20 MG/1
TABLET ORAL
Qty: 12 TABLET | Refills: 0 | Status: SHIPPED | OUTPATIENT
Start: 2018-09-25 | End: 2018-12-11 | Stop reason: ALTCHOICE

## 2018-09-25 NOTE — PROGRESS NOTES
HPI:      Linn Campos is a 41year old male.  Patient presents with:  Sinus Problem: 2days  Cough  Chest Pressure  Rash: x1week and very itchy         Current Outpatient Medications:  Mometasone Furoate 0.1 % External Cream Apply 1 Application topi reaction(s): ruq abd pain  Ibuprofen                   Comment:Other reaction(s): Trouble Breathing  Peas                    SWELLING    Comment:Facial swelling  Strawberries            RASH, SWELLING    Comment:Facial swelling  Past Medical History:  No d Concern: Not Asked        Special Diet: Not Asked        Back Care: Not Asked        Exercise: Not Asked        Bike Helmet: Not Asked        Seat Belt: Not Asked        Self-Exams: Not Asked    Social History Narrative      Not on file    Family History

## 2018-09-25 NOTE — PROGRESS NOTES
Blood pressure 145/80, pulse 83, temperature 98.2 °F (36.8 °C), temperature source Oral, height 5' 9\" (1.753 m), weight 252 lb (114.3 kg). 1 week history of nasal congestion with cough and rash on the arms bilaterally.   Using his sister's cream for the

## 2018-09-26 ENCOUNTER — TELEPHONE (OUTPATIENT)
Dept: FAMILY MEDICINE CLINIC | Facility: CLINIC | Age: 38
End: 2018-09-26

## 2018-09-26 NOTE — TELEPHONE ENCOUNTER
Hypertensive Medications  Protocol Criteria:  · Appointment scheduled in the past 6 months or in the next 3 months  · BMP or CMP in the past 12 months  · Creatinine result < 2  Recent Outpatient Visits            Today Flexural eczema    [address]

## 2018-09-26 NOTE — TELEPHONE ENCOUNTER
Patient requesting refill for     Fluticasone Propionate 50 MCG/ACT Nasal Suspension USE 2 SPRAYS IN EACH NOSTRIL DAILY Disp: 3 Bottle Rfl: 3     prescription  and out of medication.

## 2018-09-27 NOTE — TELEPHONE ENCOUNTER
Pharmacy was called and script is ready for pickup. Left pt a detailed message.  The medication requested is ready for pickup at his pharmacy    Fluticasone Propionate 50 MCG/ACT Nasal Suspension 3 Bottle 3 2/3/2018    Sig :  USE 2 SPRAYS IN EACH NOSTRIL DA

## 2018-09-29 ENCOUNTER — TELEPHONE (OUTPATIENT)
Dept: OTHER | Age: 38
End: 2018-09-29

## 2018-09-29 RX ORDER — CLARITHROMYCIN 500 MG/1
500 TABLET, COATED ORAL 2 TIMES DAILY
Qty: 20 TABLET | Refills: 0 | Status: SHIPPED | OUTPATIENT
Start: 2018-09-29 | End: 2018-10-09

## 2018-09-29 NOTE — TELEPHONE ENCOUNTER
Yrn Joseph(on behalf of Dr Chan)=please see message below and advise.      Patient calling back, states that  He saw Dr Joey Almonte last 9/25/18 and was prescribed with prednisone and  sudafed due to sinus infection , but now symptoms getting worse, s

## 2018-09-29 NOTE — TELEPHONE ENCOUNTER
msg noted   biaxin 500 mg I po bid x 10 days    SINUSITIS (SINUS INFECTION)    -encourage fluids  -humidifier at bedside  -tylenol or ibuprofen for pain, fever  -check temperature with thermometer  -netti pot-use distilled water with accompanied salt packe

## 2018-10-08 ENCOUNTER — TELEPHONE (OUTPATIENT)
Dept: OTHER | Age: 38
End: 2018-10-08

## 2018-10-08 RX ORDER — ALBUTEROL SULFATE 90 UG/1
2 AEROSOL, METERED RESPIRATORY (INHALATION) EVERY 4 HOURS PRN
Qty: 1 INHALER | Refills: 5 | Status: SHIPPED | OUTPATIENT
Start: 2018-10-08 | End: 2019-01-07

## 2018-10-08 RX ORDER — ALBUTEROL SULFATE 90 UG/1
AEROSOL, METERED RESPIRATORY (INHALATION)
Qty: 6.7 G | Refills: 5 | Status: CANCELLED | OUTPATIENT
Start: 2018-10-08

## 2018-10-08 NOTE — TELEPHONE ENCOUNTER
Pt is requesting that the sig for his proventil be changed to 1-2 puffs every 4 hours instead of just 1 puff (as current rx reads) because he has recently   been needing 2 puffs every 4 hours due to allergies and has run out of his rx before 30days.   Dmitriy

## 2018-10-08 NOTE — TELEPHONE ENCOUNTER
Patient calling stating script for Proventil too expensive. Patient states he does not want Proventil. Patient requesting script for Proair. Patient states script for Pineda Hitchcock is more affordable.      Dr. Sena Martinez:   Please see pended medication

## 2018-12-10 ENCOUNTER — NURSE TRIAGE (OUTPATIENT)
Dept: OTHER | Age: 38
End: 2018-12-10

## 2018-12-10 NOTE — TELEPHONE ENCOUNTER
Advised patient of Dr. Joey Almonte note below. Patient verbalized understanding.  Patient stating he can not get to appt until at least 4:30pm tomorrow and requested to cancel his 5:30pm appt with Kain Lutz  Because he would prefer appt with MD over PA. OV s

## 2018-12-10 NOTE — TELEPHONE ENCOUNTER
Melissa got tomorrow at 1140 am left for the rest of the week. He may have thtat spot if he needs to be seen this week.

## 2018-12-11 ENCOUNTER — TELEPHONE (OUTPATIENT)
Dept: FAMILY MEDICINE CLINIC | Facility: CLINIC | Age: 38
End: 2018-12-11

## 2018-12-11 ENCOUNTER — OFFICE VISIT (OUTPATIENT)
Dept: INTERNAL MEDICINE CLINIC | Facility: CLINIC | Age: 38
End: 2018-12-11
Payer: COMMERCIAL

## 2018-12-11 VITALS
HEART RATE: 69 BPM | WEIGHT: 256 LBS | DIASTOLIC BLOOD PRESSURE: 84 MMHG | BODY MASS INDEX: 38.8 KG/M2 | SYSTOLIC BLOOD PRESSURE: 149 MMHG | TEMPERATURE: 98 F | HEIGHT: 68 IN

## 2018-12-11 DIAGNOSIS — I10 ESSENTIAL HYPERTENSION: ICD-10-CM

## 2018-12-11 DIAGNOSIS — K21.9 GASTROESOPHAGEAL REFLUX DISEASE WITHOUT ESOPHAGITIS: Primary | ICD-10-CM

## 2018-12-11 DIAGNOSIS — J34.3 HYPERTROPHY OF NASAL TURBINATES: ICD-10-CM

## 2018-12-11 DIAGNOSIS — R09.81 NASAL CONGESTION: ICD-10-CM

## 2018-12-11 DIAGNOSIS — E78.2 MIXED HYPERLIPIDEMIA: Primary | ICD-10-CM

## 2018-12-11 DIAGNOSIS — J45.30 MILD PERSISTENT ASTHMA WITHOUT COMPLICATION: ICD-10-CM

## 2018-12-11 DIAGNOSIS — M54.89 BACK PAIN WITHOUT SCIATICA: ICD-10-CM

## 2018-12-11 DIAGNOSIS — Z23 NEED FOR INFLUENZA VACCINATION: ICD-10-CM

## 2018-12-11 DIAGNOSIS — R20.2 PARESTHESIA OF BOTH HANDS: ICD-10-CM

## 2018-12-11 PROCEDURE — 90471 IMMUNIZATION ADMIN: CPT | Performed by: INTERNAL MEDICINE

## 2018-12-11 PROCEDURE — 90686 IIV4 VACC NO PRSV 0.5 ML IM: CPT | Performed by: INTERNAL MEDICINE

## 2018-12-11 PROCEDURE — 99214 OFFICE O/P EST MOD 30 MIN: CPT | Performed by: INTERNAL MEDICINE

## 2018-12-11 PROCEDURE — 99212 OFFICE O/P EST SF 10 MIN: CPT | Performed by: INTERNAL MEDICINE

## 2018-12-11 RX ORDER — FLUTICASONE PROPIONATE 50 MCG
SPRAY, SUSPENSION (ML) NASAL
Qty: 3 BOTTLE | Refills: 3 | Status: SHIPPED | OUTPATIENT
Start: 2018-12-11 | End: 2019-09-16

## 2018-12-11 RX ORDER — ESOMEPRAZOLE MAGNESIUM 40 MG/1
40 CAPSULE, DELAYED RELEASE ORAL
Qty: 90 CAPSULE | Refills: 3 | Status: SHIPPED | OUTPATIENT
Start: 2018-12-11 | End: 2018-12-24

## 2018-12-11 RX ORDER — LEVOCETIRIZINE DIHYDROCHLORIDE 5 MG/1
5 TABLET, FILM COATED ORAL EVERY EVENING
Qty: 30 TABLET | Refills: 5 | Status: SHIPPED | OUTPATIENT
Start: 2018-12-11 | End: 2019-06-05

## 2018-12-11 NOTE — PATIENT INSTRUCTIONS
Possible Causes of Low Back or Leg Pain    The symptoms in your back or leg may be due to pressure on a nerve. This pressure may be caused by a damaged disk or by abnormal bone growth. Either way, you may feel pain, burning, tingling, or numbness.  If you Tests are often done. These tests may include blood tests, X-ray, CT (computerized tomography) scan, nerve conduction studies (NCS), or a muscle test (electromyography). Depending on the cause, treatment may include physical therapy.   Home care  · Tell you

## 2018-12-11 NOTE — PROGRESS NOTES
HPI:    Patient ID: Linda Frey is a 41year old male.   Numbness (Pt noticed numbness and tingling in bilateral hands for the past few weeks) and Sciatica (right side buttocks to lower leg)      HPI  Numbness/tingling in hands  Started about 2 wee Esomeprazole Magnesium 40 MG Oral Capsule Delayed Release Take 1 capsule (40 mg total) by mouth every morning before breakfast. Disp: 90 capsule Rfl: 3   Albuterol Sulfate HFA (PROAIR HFA) 108 (90 Base) MCG/ACT Inhalation Aero Soln Inhale 2 puffs into the Respiratory: Negative for chest tightness, shortness of breath and wheezing. Cardiovascular: Negative for chest pain, palpitations and leg swelling. Gastrointestinal: Positive for heartburn.  Negative for nausea, vomiting, abdominal pain, diarrhea and Neurological: He is alert and oriented to person, place, and time. He has normal strength. No cranial nerve deficit, sensory deficit or motor deficit. He displays a negative Romberg sign.  Coordination and gait normal.   Normal sensation of both hands to li Sig: Take 1 capsule (40 mg total) by mouth every morning before breakfast.       Imaging & Referrals:  FLULAVAL INFLUENZA VACCINE QUAD PRESERVATIVE FREE 0.5 ML  PHYSIATRY - INTERNAL    Orders This Visit:   Orders Placed This Encounter      Flulaval 6 mon Paraesthesia is a burning or prickling sensation that is sometimes felt in the hands, arms, legs or feet. It can also occur in other parts of the body. It can also feel like tingling or numbness, skin crawling, or itching.  The feeling is not comfortable, b Call your healthcare provider right away if any of the following occur:  · Numbness or weakness of the face, one arm, or one leg  · Slurred speech, confusion, trouble speaking, walking, or seeing  · Severe headache, fainting spell, dizziness, or seizure  ·

## 2018-12-11 NOTE — TELEPHONE ENCOUNTER
Pt stts he was to receive a script for cholesterol medication but still has not received . Asking for a call back.

## 2018-12-12 NOTE — TELEPHONE ENCOUNTER
Please advise regarding refill request. Also pt requesting medication for cholesterol. Per pt was taking Lipitor but start having muscle pain and discontinue.  Please advise  Hypertensive Medications  Protocol Criteria:  · Appointment scheduled in the past

## 2018-12-13 ENCOUNTER — TELEPHONE (OUTPATIENT)
Dept: OTHER | Age: 38
End: 2018-12-13

## 2018-12-13 RX ORDER — ROSUVASTATIN CALCIUM 5 MG/1
5 TABLET, COATED ORAL NIGHTLY
Qty: 90 TABLET | Refills: 0 | Status: SHIPPED | OUTPATIENT
Start: 2018-12-13 | End: 2019-03-01

## 2018-12-13 RX ORDER — LOSARTAN POTASSIUM 50 MG/1
50 TABLET ORAL
Qty: 90 TABLET | Refills: 3 | Status: SHIPPED | OUTPATIENT
Start: 2018-12-13 | End: 2019-09-16

## 2018-12-13 NOTE — TELEPHONE ENCOUNTER
Pt contacts clinic inquiring on refills of cholesterol medication, allergy med and flonase. Meds were refilled, pt notified. Also states he was informed by pharmacy that esomeprazole is only covered at 20 mg dose, not 40.   KP please advise if dose can be

## 2018-12-13 NOTE — TELEPHONE ENCOUNTER
Started rosuvastatin  Different than atorvastatin (that caused stomach pain in the past) if it gives him abd pain stop it and let me know repeat labs in 1 mo. And f/u after labs  Schedule appt.

## 2018-12-14 NOTE — TELEPHONE ENCOUNTER
Start with 1 daily, 45-60 minutes before breakfast and if not improving after 7-10 days, start taking 20mg twice a day for another 7-10 days. If still not improved have patient contact office and will re-evaluate.  Pt can also purchase OTC, please ask him t

## 2018-12-20 NOTE — TELEPHONE ENCOUNTER
Dr. Shwetha Charles, pls advise    Patient currently taking Esomeprazole 20mg once daily for heartburn, pt recently started Rosuvastatin after being off chol med for a while. Do you approve med? Patient is aware insurance does not cover Esomeprazole 40mg.

## 2018-12-24 NOTE — TELEPHONE ENCOUNTER
Patient returned call, advised of notes per Dr Hilario Jay, new script with dosage of 20mg sent to pharmacy today. Patient reported his previous dose was not covered was just waiting on new script.

## 2018-12-24 NOTE — TELEPHONE ENCOUNTER
Patient contacted and informed of [facility].

## 2018-12-28 ENCOUNTER — TELEPHONE (OUTPATIENT)
Dept: OTHER | Age: 38
End: 2018-12-28

## 2018-12-28 NOTE — TELEPHONE ENCOUNTER
Received fax from [facility] [address] needs to re-process claim with one of the following NDCs: [id number] or [id number]. Called pharm and spoke w/ Milla Finch, relayed msg and [address] #'s. Re-processed claim was successful.

## 2018-12-28 NOTE — TELEPHONE ENCOUNTER
Called pharmacy to confirm if PA is required for Esomeprazole Magnesium 20 MG Oral Capsule Delayed Release, PA is required per pharm.     Prior authorization for Esomeprazole Magnesium 20MG  oral cap completed w/ [address]) on cover my meds Ke

## 2019-01-03 ENCOUNTER — TELEPHONE (OUTPATIENT)
Dept: GASTROENTEROLOGY | Facility: CLINIC | Age: 39
End: 2019-01-03

## 2019-01-03 NOTE — TELEPHONE ENCOUNTER
Pt states that he has been having problems with swallowing again and would like to know what he can do to help with symptoms. Please call.

## 2019-01-04 ENCOUNTER — TELEPHONE (OUTPATIENT)
Dept: GASTROENTEROLOGY | Facility: CLINIC | Age: 39
End: 2019-01-04

## 2019-01-04 RX ORDER — ESOMEPRAZOLE MAGNESIUM 40 MG/1
40 CAPSULE, DELAYED RELEASE ORAL
Qty: 60 CAPSULE | Refills: 0 | Status: SHIPPED | OUTPATIENT
Start: 2019-01-04 | End: 2019-01-23

## 2019-01-04 NOTE — TELEPHONE ENCOUNTER
[place]/Pharm calling for mutual pt regarding questions rx:Vijay De La Cruz pls call at:[phone],thanks.

## 2019-01-04 NOTE — TELEPHONE ENCOUNTER
Medication Detail     Medication Quantity Refills Start End   Fluticasone 100 MCG/BLIST Inhalation Aerosol Powder, Breath Activated 4 each 0 1/4/2019 2/3/2019   Sig :  Inhale 2 puffs into the lungs 4 (four) times daily.  Spray into a teaspoon and take 2 puf

## 2019-01-04 NOTE — TELEPHONE ENCOUNTER
Patient states he has burning sensation and heartburn symptoms. Patient states he has trouble swallowing after he eats something. Denies having any trouble swallowing salvia or trouble breathing.    Symptoms have been going on for about 2 weeks, pt take

## 2019-01-04 NOTE — TELEPHONE ENCOUNTER
ER X for increased dose Nexium 40 twice daily sent. Also fluticasone spray take 2 puffs in a teaspoon and swallowed 4 times daily. If he develops food impaction he should go to the emergency room. Please clarify these prescriptions if needed.

## 2019-01-05 ENCOUNTER — TELEPHONE (OUTPATIENT)
Dept: GASTROENTEROLOGY | Facility: CLINIC | Age: 39
End: 2019-01-05

## 2019-01-05 NOTE — TELEPHONE ENCOUNTER
Current Outpatient Medications:  Esomeprazole Magnesium 40 MG Oral Capsule Delayed Release Take 1 capsule (40 mg total) by mouth 2 (two) times daily before meals.  Disp: 60 capsule Rfl: 0     Covermymeds PA Key [id number]

## 2019-01-05 NOTE — TELEPHONE ENCOUNTER
No I want the liquid inhaler to be sprayed in the spoon and swallowed. Please inform  pharmacist.  There were about 100 choices in the computer and I could not locate the correct one.

## 2019-01-07 RX ORDER — ALBUTEROL SULFATE 90 UG/1
2 AEROSOL, METERED RESPIRATORY (INHALATION) EVERY 4 HOURS PRN
Qty: 1 INHALER | Refills: 2 | Status: SHIPPED | OUTPATIENT
Start: 2019-01-07 | End: 2019-02-05

## 2019-01-07 NOTE — TELEPHONE ENCOUNTER
Medication PA Requested: Esomeprazole 40 mg                                                         Pt insurance/number to contact: [employer] [facility] [employer]  CoverMyMeds Used:  Yes Key: [id number]  Insurance ID# and group: [id number]; [id number]  Dx.: GLENN

## 2019-01-07 NOTE — TELEPHONE ENCOUNTER
Pt is requesting a refill         Current Outpatient Medications:  Albuterol Sulfate HFA (PROAIR HFA) 108 (90 Base) MCG/ACT Inhalation Aero Soln Inhale 2 puffs into the lungs every 4 (four) hours as needed for Wheezing.  Disp: 1 Inhaler Rfl: 5

## 2019-01-07 NOTE — TELEPHONE ENCOUNTER
Dr Shakira Negron,    I spoke to the pharmacist    The treatment is with Flovent, a metered dose inhaler which is swallowed. I pended the medication.  The directions are as follows    Please follow these directions when using Flovent for EOE:    Remove the cap from t

## 2019-01-08 ENCOUNTER — PATIENT MESSAGE (OUTPATIENT)
Dept: FAMILY MEDICINE CLINIC | Facility: CLINIC | Age: 39
End: 2019-01-08

## 2019-01-08 NOTE — TELEPHONE ENCOUNTER
Review pended refill request as it does not fall under a protocol.   Sent to provider, No protocol associated with med in Rx folder    Requested Prescriptions     Pending Prescriptions Disp Refills   • PROAIR  (90 Base) MCG/ACT Inhalation Aero Soln [

## 2019-01-08 NOTE — TELEPHONE ENCOUNTER
From: Gage Woo  To: Korey Triana DO  Sent: 1/8/2019 2:41 PM CST  Subject: Prescription Question    I need refills on the proair

## 2019-01-11 NOTE — TELEPHONE ENCOUNTER
Esomeprazole denied through [facility] they do not approve twice daily therapy for GERD. Letter sent to scanning. Please advise next step.

## 2019-01-14 RX ORDER — ESOMEPRAZOLE MAGNESIUM 40 MG/1
40 CAPSULE, DELAYED RELEASE ORAL DAILY
Qty: 90 CAPSULE | Refills: 2 | Status: SHIPPED | OUTPATIENT
Start: 2019-01-14 | End: 2019-04-15

## 2019-01-14 NOTE — TELEPHONE ENCOUNTER
Esomeprazole changed to 40 mg daily before breakfast.  (Instead of 20 mg twice daily) please inform patient. Then close the encounter.

## 2019-01-14 NOTE — TELEPHONE ENCOUNTER
I spoke to the pt. He will take the Nexium, once daily.     On 04/05/17, pt was diagnosed with Candida of the esophagus    Final Diagnosis:   Esophageal brushing; Non-gyn cytology:  Adequacy: Satisfactory for evaluation  General Category: Benign, inflamm

## 2019-01-23 ENCOUNTER — OFFICE VISIT (OUTPATIENT)
Dept: FAMILY MEDICINE CLINIC | Facility: CLINIC | Age: 39
End: 2019-01-23
Payer: COMMERCIAL

## 2019-01-23 ENCOUNTER — HOSPITAL ENCOUNTER (OUTPATIENT)
Dept: GENERAL RADIOLOGY | Age: 39
Discharge: HOME | End: 2019-01-23
Attending: FAMILY MEDICINE
Payer: COMMERCIAL

## 2019-01-23 VITALS
SYSTOLIC BLOOD PRESSURE: 130 MMHG | HEIGHT: 68 IN | DIASTOLIC BLOOD PRESSURE: 56 MMHG | BODY MASS INDEX: 38.66 KG/M2 | HEART RATE: 78 BPM | WEIGHT: 255.13 LBS | TEMPERATURE: 98 F

## 2019-01-23 DIAGNOSIS — M25.522 LEFT ELBOW PAIN: Primary | ICD-10-CM

## 2019-01-23 DIAGNOSIS — M25.522 LEFT ELBOW PAIN: ICD-10-CM

## 2019-01-23 PROCEDURE — 99212 OFFICE O/P EST SF 10 MIN: CPT | Performed by: FAMILY MEDICINE

## 2019-01-23 PROCEDURE — 73080 X-RAY EXAM OF ELBOW: CPT | Performed by: FAMILY MEDICINE

## 2019-01-23 PROCEDURE — 99214 OFFICE O/P EST MOD 30 MIN: CPT | Performed by: FAMILY MEDICINE

## 2019-01-23 RX ORDER — AMOXICILLIN AND CLAVULANATE POTASSIUM 875; 125 MG/1; MG/1
1 TABLET, FILM COATED ORAL 2 TIMES DAILY
Qty: 20 TABLET | Refills: 0 | Status: SHIPPED | OUTPATIENT
Start: 2019-01-23 | End: 2019-02-02

## 2019-01-23 NOTE — PROGRESS NOTES
Blood pressure 130/56, pulse 78, temperature 98.4 °F (36.9 °C), temperature source Oral, height 5' 8\" (1.727 m), weight 255 lb 2 oz (115.7 kg).     1 week history coughing up green yellow phlegm with green yellow nasal congestion and sinus pressure history

## 2019-02-01 ENCOUNTER — TELEPHONE (OUTPATIENT)
Dept: GASTROENTEROLOGY | Facility: CLINIC | Age: 39
End: 2019-02-01

## 2019-02-01 NOTE — TELEPHONE ENCOUNTER
Medication PA Requested:  Esomeprazole 40 mg QD                                                   Pt insurance/number to contact: [facility] [phone]  CoverMyMeds Used:  Yes Key: [employer] ID# and group: [id number] / [id number]  Dx.: EDIS

## 2019-02-01 NOTE — TELEPHONE ENCOUNTER
Current Outpatient Medications:  Esomeprazole Magnesium 40 MG Oral Capsule Delayed Release Take 1 capsule (40 mg total) by mouth daily.  Disp: 90 capsule Rfl: 2     Covermymeds PA Key [id number]

## 2019-02-04 NOTE — TELEPHONE ENCOUNTER
Received response to PA request Provider Notification from [address] [place], [place] [address]  Sent to scanning

## 2019-02-04 NOTE — TELEPHONE ENCOUNTER
Received this message via Cover My Meds:    Cancelledon February 3   Medication does not require prior authorization. It is listed in the plan formulary.

## 2019-02-05 RX ORDER — ALBUTEROL SULFATE 90 UG/1
2 AEROSOL, METERED RESPIRATORY (INHALATION) EVERY 4 HOURS PRN
Qty: 8.5 G | Refills: 4 | Status: SHIPPED | OUTPATIENT
Start: 2019-02-05 | End: 2019-04-15

## 2019-02-05 NOTE — TELEPHONE ENCOUNTER
Review pended refill request as it does not fall under a protocol.     Last Rx: 1-7-19  LOV: 1-23-19

## 2019-02-06 ENCOUNTER — OFFICE VISIT (OUTPATIENT)
Dept: GASTROENTEROLOGY | Facility: CLINIC | Age: 39
End: 2019-02-06
Payer: COMMERCIAL

## 2019-02-06 VITALS
WEIGHT: 258.81 LBS | HEART RATE: 88 BPM | SYSTOLIC BLOOD PRESSURE: 135 MMHG | BODY MASS INDEX: 39.22 KG/M2 | HEIGHT: 68 IN | DIASTOLIC BLOOD PRESSURE: 83 MMHG

## 2019-02-06 DIAGNOSIS — K21.9 GASTROESOPHAGEAL REFLUX DISEASE, ESOPHAGITIS PRESENCE NOT SPECIFIED: Primary | ICD-10-CM

## 2019-02-06 DIAGNOSIS — K20.0 EOSINOPHILIC ESOPHAGITIS: ICD-10-CM

## 2019-02-06 PROCEDURE — 99214 OFFICE O/P EST MOD 30 MIN: CPT | Performed by: INTERNAL MEDICINE

## 2019-02-06 RX ORDER — PANTOPRAZOLE SODIUM 40 MG/1
40 TABLET, DELAYED RELEASE ORAL 2 TIMES DAILY
Qty: 180 TABLET | Refills: 3 | Status: SHIPPED | OUTPATIENT
Start: 2019-02-06 | End: 2020-04-14

## 2019-02-06 NOTE — PATIENT INSTRUCTIONS
1. Schedule EGD with MAC at [facility], dx EOE    2.   Take bid PPI, either pantoprazole 40 mg bid or Esomeprazole 40 mg bid

## 2019-02-06 NOTE — PROGRESS NOTES
HPI:    Patient ID: Siddhartha Carey is a 41year old male. History of present illness: This is a 46-year-old male patient of Dr. Jojo Pacheco with a history of eosinophilic esophagitis, GERD, asthma, and gastritis.   The patient is here for ro esophagitis: As per #1     3. Pre operative clearance: The patient is ASA class III due to BMI greater than 35 and should have EGD at hospital with MAC.             Review of Systems   Constitutional: Negative for activity change, appetite change, chills, Disp: 90 tablet Rfl: 3   Rosuvastatin Calcium 5 MG Oral Tab Take 1 tablet (5 mg total) by mouth nightly.  Disp: 90 tablet Rfl: 0   Fluticasone Propionate 50 MCG/ACT Nasal Suspension USE 2 SPRAYS IN EACH NOSTRIL DAILY Disp: 3 Bottle Rfl: 3   Levocetirizine D wheezes. He has no rales. He exhibits no tenderness. Abdominal: Soft. Normal appearance and bowel sounds are normal. He exhibits no distension, no fluid wave, no ascites and no mass. There is no hepatosplenomegaly. There is no tenderness.  There is no CVA

## 2019-02-07 ENCOUNTER — TELEPHONE (OUTPATIENT)
Dept: GASTROENTEROLOGY | Facility: CLINIC | Age: 39
End: 2019-02-07

## 2019-02-07 DIAGNOSIS — K20.0 EOSINOPHILIC ESOPHAGITIS: Primary | ICD-10-CM

## 2019-02-07 DIAGNOSIS — K21.9 GASTROESOPHAGEAL REFLUX DISEASE, ESOPHAGITIS PRESENCE NOT SPECIFIED: ICD-10-CM

## 2019-02-07 NOTE — TELEPHONE ENCOUNTER
Scheduled for:  EGD [address]  Provider Name:   Date:  4/18/19  Location: [facility]    Sedation:  MAC  Time:  830am arrival time 730am  Prep: NPO after midnight  Meds/Allergies Reconciled?:  Physician reviewed  Diagnosis with codes:  EOE K20.0; GERD k21.9  Was

## 2019-03-01 ENCOUNTER — OFFICE VISIT (OUTPATIENT)
Dept: FAMILY MEDICINE CLINIC | Facility: CLINIC | Age: 39
End: 2019-03-01
Payer: COMMERCIAL

## 2019-03-01 VITALS
HEIGHT: 68 IN | DIASTOLIC BLOOD PRESSURE: 83 MMHG | SYSTOLIC BLOOD PRESSURE: 151 MMHG | HEART RATE: 69 BPM | WEIGHT: 255 LBS | TEMPERATURE: 98 F | BODY MASS INDEX: 38.65 KG/M2

## 2019-03-01 DIAGNOSIS — L30.9 ECZEMA, UNSPECIFIED TYPE: Primary | ICD-10-CM

## 2019-03-01 DIAGNOSIS — J32.0 CHRONIC MAXILLARY SINUSITIS: ICD-10-CM

## 2019-03-01 DIAGNOSIS — J45.40 MODERATE PERSISTENT ASTHMA, UNSPECIFIED WHETHER COMPLICATED: ICD-10-CM

## 2019-03-01 PROCEDURE — 99214 OFFICE O/P EST MOD 30 MIN: CPT | Performed by: FAMILY MEDICINE

## 2019-03-01 PROCEDURE — 99212 OFFICE O/P EST SF 10 MIN: CPT | Performed by: FAMILY MEDICINE

## 2019-03-01 RX ORDER — ROSUVASTATIN CALCIUM 5 MG/1
5 TABLET, COATED ORAL NIGHTLY
Qty: 90 TABLET | Refills: 3 | Status: SHIPPED | OUTPATIENT
Start: 2019-03-01 | End: 2019-09-16

## 2019-03-01 RX ORDER — CIPROFLOXACIN 500 MG/1
500 TABLET, FILM COATED ORAL 2 TIMES DAILY
Qty: 20 TABLET | Refills: 0 | Status: SHIPPED | OUTPATIENT
Start: 2019-03-01 | End: 2019-03-16 | Stop reason: ALTCHOICE

## 2019-03-01 RX ORDER — ALBUTEROL SULFATE 90 UG/1
2 AEROSOL, METERED RESPIRATORY (INHALATION) EVERY 4 HOURS PRN
Qty: 1 INHALER | Refills: 3 | Status: SHIPPED | OUTPATIENT
Start: 2019-03-01 | End: 2019-03-16 | Stop reason: SDUPTHER

## 2019-03-01 NOTE — PROGRESS NOTES
Where: nasal congestion and cough  Quality (Constant/Sharp?): sharp  Severity: mild mod pain  Duration: 3 days  Timing: constant  When does it occur: day and night.   Modifying factors:   Associated signs symptoms: cough     Needs refill on chol  Labs revie

## 2019-03-07 ENCOUNTER — TELEPHONE (OUTPATIENT)
Dept: GASTROENTEROLOGY | Facility: CLINIC | Age: 39
End: 2019-03-07

## 2019-03-07 DIAGNOSIS — K20.0 EOSINOPHILIC ESOPHAGITIS: ICD-10-CM

## 2019-03-07 DIAGNOSIS — K21.9 GASTROESOPHAGEAL REFLUX DISEASE, ESOPHAGITIS PRESENCE NOT SPECIFIED: Primary | ICD-10-CM

## 2019-03-07 NOTE — TELEPHONE ENCOUNTER
Pt states that he wants to cancel 4/18/19 EGD Proc. Pt does not want to resched at this time. He will call back when he decides to sched a new appt.

## 2019-03-08 NOTE — TELEPHONE ENCOUNTER
Appt cancelled in Epic    Surgical Change Request sent to Endo    Recall for EGD placed for 3 months    FYI to Dr Briana Rocha

## 2019-03-16 ENCOUNTER — OFFICE VISIT (OUTPATIENT)
Dept: FAMILY MEDICINE CLINIC | Facility: CLINIC | Age: 39
End: 2019-03-16
Payer: COMMERCIAL

## 2019-03-16 VITALS
WEIGHT: 255 LBS | HEIGHT: 68 IN | SYSTOLIC BLOOD PRESSURE: 126 MMHG | HEART RATE: 77 BPM | BODY MASS INDEX: 38.65 KG/M2 | DIASTOLIC BLOOD PRESSURE: 82 MMHG

## 2019-03-16 DIAGNOSIS — J01.01 ACUTE RECURRENT MAXILLARY SINUSITIS: ICD-10-CM

## 2019-03-16 DIAGNOSIS — E66.9 OBESITY (BMI 35.0-39.9 WITHOUT COMORBIDITY): ICD-10-CM

## 2019-03-16 DIAGNOSIS — J45.40 MODERATE PERSISTENT ASTHMA WITHOUT COMPLICATION: Primary | ICD-10-CM

## 2019-03-16 DIAGNOSIS — Z00.00 ROUTINE PHYSICAL EXAMINATION: ICD-10-CM

## 2019-03-16 DIAGNOSIS — K21.9 GASTROESOPHAGEAL REFLUX DISEASE, ESOPHAGITIS PRESENCE NOT SPECIFIED: ICD-10-CM

## 2019-03-16 PROBLEM — E78.49 OTHER HYPERLIPIDEMIA: Status: ACTIVE | Noted: 2019-03-16

## 2019-03-16 PROBLEM — I10 ESSENTIAL HYPERTENSION: Status: ACTIVE | Noted: 2019-03-16

## 2019-03-16 PROBLEM — R10.11 RUQ ABDOMINAL PAIN: Status: RESOLVED | Noted: 2017-02-09 | Resolved: 2019-03-16

## 2019-03-16 PROBLEM — R09.81 NASAL CONGESTION: Status: RESOLVED | Noted: 2017-02-09 | Resolved: 2019-03-16

## 2019-03-16 PROCEDURE — 99395 PREV VISIT EST AGE 18-39: CPT | Performed by: NURSE PRACTITIONER

## 2019-03-16 NOTE — PROGRESS NOTES
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 Feels well without complaints.   Had flu shot this year; utd on tdap and mmr    Diet is fair; exercise-walks    fm-father htn, prostate ca, mother allergies and asthma    Always feels congested-started using n Mother    • Prostate Cancer Father    • Hypertension Father        Social History    Socioeconomic History      Marital status: Single      Spouse name: Not on file      Number of children: 1      Years of education: Not on file      Highest education leve Asked        Self-Exams: Not Asked    Social History Narrative      Not on file        Current Outpatient Medications:  triamcinolone acetonide 0.1 % External Cream Apply topically 2 (two) times daily as needed.  Disp: 60 g Rfl: 3   Rosuvastatin Calcium 5 M Exam   Nursing note and vitals reviewed. Constitutional: He is oriented to person, place, and time and obese. He appears well-developed and well-nourished. No distress. HENT:   Head: Normocephalic and atraumatic.    Right Ear: Tympanic membrane and ear 150 min per week           Relevant Orders    CBC, PLATELET; NO DIFFERENTIAL    COMP METABOLIC PANEL (14)    HEMOGLOBIN A1C    LIPID PANEL    TSH W REFLEX TO FREE T4    VITAMIN B12    VITAMIN D, 25-HYDROXY    Obesity (BMI 35.0-39.9 without comorbidity)

## 2019-03-16 NOTE — ASSESSMENT & PLAN NOTE
Screening labs  utd vaccines  Enc healthy eating and exercise (combo cardio and weight training)  with goal of 150 min per week

## 2019-03-16 NOTE — ASSESSMENT & PLAN NOTE
Chronic--otc non-drowsy antihistamine (generic claritin, zyrtec or allegra)  -add flonase (fluticazone) nasal spray  -supportive care discussed  neti pot bid-tid

## 2019-03-23 ENCOUNTER — APPOINTMENT (OUTPATIENT)
Dept: LAB | Age: 39
End: 2019-03-23
Attending: NURSE PRACTITIONER
Payer: COMMERCIAL

## 2019-03-23 DIAGNOSIS — Z00.00 ROUTINE PHYSICAL EXAMINATION: ICD-10-CM

## 2019-03-23 DIAGNOSIS — E78.2 MIXED HYPERLIPIDEMIA: ICD-10-CM

## 2019-03-23 LAB
ALBUMIN SERPL-MCNC: 4 G/DL (ref 3.4–5)
ALBUMIN/GLOB SERPL: 1.3 {RATIO} (ref 1–2)
ALP LIVER SERPL-CCNC: 60 U/L (ref 45–117)
ALT SERPL-CCNC: 43 U/L (ref 16–61)
ANION GAP SERPL CALC-SCNC: 6 MMOL/L (ref 0–18)
AST SERPL-CCNC: 16 U/L (ref 15–37)
BILIRUB SERPL-MCNC: 0.5 MG/DL (ref 0.1–2)
BUN BLD-MCNC: 12 MG/DL (ref 7–18)
BUN/CREAT SERPL: 18.5 (ref 10–20)
CALCIUM BLD-MCNC: 9 MG/DL (ref 8.5–10.1)
CHLORIDE SERPL-SCNC: 108 MMOL/L (ref 98–107)
CHOLEST SMN-MCNC: 138 MG/DL (ref ?–200)
CO2 SERPL-SCNC: 27 MMOL/L (ref 21–32)
CREAT BLD-MCNC: 0.65 MG/DL (ref 0.7–1.3)
DEPRECATED RDW RBC AUTO: 38.6 FL (ref 35.1–46.3)
ERYTHROCYTE [DISTWIDTH] IN BLOOD BY AUTOMATED COUNT: 12.6 % (ref 11–15)
EST. AVERAGE GLUCOSE BLD GHB EST-MCNC: 120 MG/DL (ref 68–126)
GLOBULIN PLAS-MCNC: 3.2 G/DL (ref 2.8–4.4)
GLUCOSE BLD-MCNC: 91 MG/DL (ref 70–99)
HBA1C MFR BLD HPLC: 5.8 % (ref ?–5.7)
HCT VFR BLD AUTO: 46.3 % (ref 39–53)
HDLC SERPL-MCNC: 34 MG/DL (ref 40–59)
HGB BLD-MCNC: 14.9 G/DL (ref 13–17.5)
LDLC SERPL CALC-MCNC: 73 MG/DL (ref ?–100)
M PROTEIN MFR SERPL ELPH: 7.2 G/DL (ref 6.4–8.2)
MCH RBC QN AUTO: 27.4 PG (ref 26–34)
MCHC RBC AUTO-ENTMCNC: 32.2 G/DL (ref 31–37)
MCV RBC AUTO: 85.1 FL (ref 80–100)
NONHDLC SERPL-MCNC: 104 MG/DL (ref ?–130)
OSMOLALITY SERPL CALC.SUM OF ELEC: 291 MOSM/KG (ref 275–295)
PLATELET # BLD AUTO: 263 10(3)UL (ref 150–450)
POTASSIUM SERPL-SCNC: 4.2 MMOL/L (ref 3.5–5.1)
RBC # BLD AUTO: 5.44 X10(6)UL (ref 4.3–5.7)
SODIUM SERPL-SCNC: 141 MMOL/L (ref 136–145)
TRIGL SERPL-MCNC: 157 MG/DL (ref 30–149)
TSI SER-ACNC: 2.2 MIU/ML (ref 0.36–3.74)
VIT B12 SERPL-MCNC: 1385 PG/ML (ref 193–986)
VLDLC SERPL CALC-MCNC: 31 MG/DL (ref 0–30)
WBC # BLD AUTO: 8.4 X10(3) UL (ref 4–11)

## 2019-03-23 PROCEDURE — 82607 VITAMIN B-12: CPT

## 2019-03-23 PROCEDURE — 85027 COMPLETE CBC AUTOMATED: CPT

## 2019-03-23 PROCEDURE — 36415 COLL VENOUS BLD VENIPUNCTURE: CPT

## 2019-03-23 PROCEDURE — 82306 VITAMIN D 25 HYDROXY: CPT

## 2019-03-23 PROCEDURE — 80053 COMPREHEN METABOLIC PANEL: CPT

## 2019-03-23 PROCEDURE — 80061 LIPID PANEL: CPT

## 2019-03-23 PROCEDURE — 83036 HEMOGLOBIN GLYCOSYLATED A1C: CPT

## 2019-03-23 PROCEDURE — 84443 ASSAY THYROID STIM HORMONE: CPT

## 2019-03-25 LAB — 25(OH)D3 SERPL-MCNC: 12.5 NG/ML (ref 30–100)

## 2019-03-26 DIAGNOSIS — E55.9 VITAMIN D DEFICIENCY: Primary | ICD-10-CM

## 2019-03-26 RX ORDER — ERGOCALCIFEROL 1.25 MG/1
50000 CAPSULE ORAL
Qty: 12 CAPSULE | Refills: 4 | Status: SHIPPED | OUTPATIENT
Start: 2019-03-26 | End: 2019-03-31

## 2019-03-31 ENCOUNTER — TELEPHONE (OUTPATIENT)
Dept: FAMILY MEDICINE CLINIC | Facility: CLINIC | Age: 39
End: 2019-03-31

## 2019-03-31 RX ORDER — CHOLECALCIFEROL (VITAMIN D3) 2400/ML
LIQUID (ML) MISCELLANEOUS
Qty: 1 BOTTLE | Refills: 5 | Status: SHIPPED | OUTPATIENT
Start: 2019-03-31 | End: 2019-11-23

## 2019-03-31 NOTE — TELEPHONE ENCOUNTER
Pt calling to state that he cannot swallow ergocalciferol caps-usually uses liquid or crushes tabs. Wants to know if he can open tabs. Advsed that capsules cannot be altered due to extended release formulation.

## 2019-04-15 RX ORDER — ALBUTEROL SULFATE 90 UG/1
2 AEROSOL, METERED RESPIRATORY (INHALATION) EVERY 4 HOURS PRN
Qty: 8.5 G | Refills: 4 | OUTPATIENT
Start: 2019-04-15

## 2019-04-16 RX ORDER — ESOMEPRAZOLE MAGNESIUM 40 MG/1
40 CAPSULE, DELAYED RELEASE ORAL DAILY
Qty: 90 CAPSULE | Refills: 2 | Status: SHIPPED | OUTPATIENT
Start: 2019-04-16 | End: 2019-11-23

## 2019-04-16 NOTE — TELEPHONE ENCOUNTER
Requested Prescriptions     Pending Prescriptions Disp Refills   • Esomeprazole Magnesium 40 MG Oral Capsule Delayed Release 90 capsule 2     Sig: Take 1 capsule (40 mg total) by mouth daily.      lov-2/6/19  lr-1/14/19

## 2019-04-17 NOTE — TELEPHONE ENCOUNTER
Pharmacy requesting refill as pt would like it ordered by physician as [place] [address] per provider, not per pt request.  Pharmacist explained to me that [facility] recently went generic, and the generic is actually more expensive than the brand at the present

## 2019-04-18 RX ORDER — ALBUTEROL SULFATE 90 UG/1
2 AEROSOL, METERED RESPIRATORY (INHALATION) EVERY 4 HOURS PRN
Qty: 8.5 G | Refills: 4 | Status: SHIPPED | OUTPATIENT
Start: 2019-04-18 | End: 2020-03-18

## 2019-04-22 ENCOUNTER — NURSE TRIAGE (OUTPATIENT)
Dept: OTHER | Age: 39
End: 2019-04-22

## 2019-04-22 NOTE — TELEPHONE ENCOUNTER
Spoke with pt who states he has already been on Flonase for several years and doesn't think it is working. Pt states \"I was hoping Dr Evaristo Thomas could recommend any new products that may be out there or maybe even an inhaler\"    Please advise.

## 2019-04-22 NOTE — TELEPHONE ENCOUNTER
Action Requested: Summary for Provider     []  Critical Lab, Recommendations Needed  [] Need Additional Advice  []   FYI    []   Need Orders  [] Need Medications Sent to Pharmacy  []  Other     SUMMARY: Dr. Worrell 16 pt stated that he always has allergies but fo

## 2019-04-23 NOTE — TELEPHONE ENCOUNTER
Patient called back and requesting OV, advised that Dr Juan Cristina is not working tomorrow and oferred Dr Mike Peters but declines, requesting to see different provider. OV made with Dr Tico Lyle and agrees.     Future Appointments   Date Time Provi

## 2019-04-23 NOTE — TELEPHONE ENCOUNTER
Advised patient of Dr. Cordon Friend note. Patient verbalized understanding. Patient states he will have to check his work schedule and will call back tomorrow to schedule OV.

## 2019-04-24 ENCOUNTER — OFFICE VISIT (OUTPATIENT)
Dept: INTERNAL MEDICINE CLINIC | Facility: CLINIC | Age: 39
End: 2019-04-24
Payer: COMMERCIAL

## 2019-04-24 VITALS
HEIGHT: 68 IN | RESPIRATION RATE: 20 BRPM | WEIGHT: 259.63 LBS | DIASTOLIC BLOOD PRESSURE: 80 MMHG | OXYGEN SATURATION: 94 % | BODY MASS INDEX: 39.35 KG/M2 | TEMPERATURE: 99 F | SYSTOLIC BLOOD PRESSURE: 138 MMHG | HEART RATE: 79 BPM

## 2019-04-24 DIAGNOSIS — J32.0 MAXILLARY SINUSITIS, UNSPECIFIED CHRONICITY: ICD-10-CM

## 2019-04-24 DIAGNOSIS — J30.9 ALLERGIC RHINITIS, UNSPECIFIED SEASONALITY, UNSPECIFIED TRIGGER: Primary | ICD-10-CM

## 2019-04-24 DIAGNOSIS — K21.9 GASTROESOPHAGEAL REFLUX DISEASE, ESOPHAGITIS PRESENCE NOT SPECIFIED: ICD-10-CM

## 2019-04-24 PROCEDURE — 99214 OFFICE O/P EST MOD 30 MIN: CPT | Performed by: INTERNAL MEDICINE

## 2019-04-24 PROCEDURE — 99212 OFFICE O/P EST SF 10 MIN: CPT | Performed by: INTERNAL MEDICINE

## 2019-04-24 PROCEDURE — 87880 STREP A ASSAY W/OPTIC: CPT | Performed by: INTERNAL MEDICINE

## 2019-04-24 RX ORDER — FLUTICASONE PROPIONATE AND SALMETEROL 250; 50 UG/1; UG/1
1 POWDER RESPIRATORY (INHALATION) EVERY 12 HOURS SCHEDULED
Qty: 1 EACH | Refills: 0 | Status: SHIPPED | OUTPATIENT
Start: 2019-04-24 | End: 2019-05-10

## 2019-04-24 RX ORDER — METHYLPREDNISOLONE 4 MG/1
TABLET ORAL
Qty: 1 KIT | Refills: 0 | Status: SHIPPED | OUTPATIENT
Start: 2019-04-24 | End: 2019-11-23

## 2019-04-24 RX ORDER — CLINDAMYCIN HYDROCHLORIDE 300 MG/1
300 CAPSULE ORAL 3 TIMES DAILY
Qty: 30 CAPSULE | Refills: 0 | Status: SHIPPED | OUTPATIENT
Start: 2019-04-24 | End: 2019-11-23

## 2019-04-24 RX ORDER — SACCHAROMYCES BOULARDII 250 MG
250 CAPSULE ORAL 2 TIMES DAILY
Qty: 20 CAPSULE | Refills: 0 | Status: SHIPPED | OUTPATIENT
Start: 2019-04-24 | End: 2019-11-23

## 2019-04-24 NOTE — PROGRESS NOTES
HPI:    Patient ID: Bina Aparicio is a 41year old male. Patient presents with:   Allergies: Pt state that he has allergies symptoms with sinus drainage- yellow , nasal congestion with some SOB for the past 1 1/2 week    Patient in office today for and frequency. Skin: Negative for pallor. Neurological: Negative for dizziness and headaches. Psychiatric/Behavioral: Negative for confusion. The patient is not nervous/anxious.                Current Outpatient Medications:  methylPREDNISolone (Mariana Eugene 3   Vitamin C 500 MG Oral Tab Take 500 mg by mouth daily. Disp:  Rfl:    Vitamin B-12 1000 MCG Oral Tab Take 5,000 mcg by mouth every other day.  Disp:  Rfl:    Albuterol Sulfate (2.5 MG/3ML) 0.083% Inhalation Nebu Soln Take 3 mL (2.5 mg total) by nebulizat murmur heard. Pulmonary/Chest: Effort normal and breath sounds normal. No respiratory distress. He has no wheezes. He has no rales. Abdominal: Soft. He exhibits no mass. There is no hepatosplenomegaly. There is no tenderness. There is no CVA tenderness. understanding and compliance. 3. Maxillary sinusitis, unspecified chronicity    Advised patient to start clindamycin 3 times daily  Continues to use allergy medicine  Medrol dose pack   Side effects and directions of medication discussed with patient.

## 2019-04-29 ENCOUNTER — OFFICE VISIT (OUTPATIENT)
Dept: OTOLARYNGOLOGY | Facility: CLINIC | Age: 39
End: 2019-04-29
Payer: COMMERCIAL

## 2019-04-29 VITALS
TEMPERATURE: 98 F | DIASTOLIC BLOOD PRESSURE: 88 MMHG | BODY MASS INDEX: 39.25 KG/M2 | SYSTOLIC BLOOD PRESSURE: 135 MMHG | WEIGHT: 259 LBS | HEIGHT: 68 IN

## 2019-04-29 DIAGNOSIS — J32.9 CHRONIC SINUSITIS, UNSPECIFIED LOCATION: Primary | ICD-10-CM

## 2019-04-29 PROCEDURE — 99213 OFFICE O/P EST LOW 20 MIN: CPT | Performed by: OTOLARYNGOLOGY

## 2019-04-29 PROCEDURE — 99212 OFFICE O/P EST SF 10 MIN: CPT | Performed by: OTOLARYNGOLOGY

## 2019-04-30 NOTE — PROGRESS NOTES
Huy Mir is a 41year old male. Patient presents with:  Nose Problem: reoccurring sinus infections     HPI:   He is continued to have problems with very frequent sinus infections.   Happening more frequently and they are becoming more difficult B-12 1000 MCG Oral Tab Take 5,000 mcg by mouth every other day. Disp:  Rfl:    Albuterol Sulfate (2.5 MG/3ML) 0.083% Inhalation Nebu Soln Take 3 mL (2.5 mg total) by nebulization every 6 (six) hours as needed for Wheezing.  Disp: 1 Box Rfl: 1   methylPREDNI Normal. Cranial nerves - Cranial nerves II through XII grossly intact.    Neck Exam Normal Inspection - Normal. Palpation - Normal. Parotid gland - Normal. Thyroid gland - Normal.   Psychiatric Normal Orientation - Oriented to time, place, person & situatio

## 2019-05-09 ENCOUNTER — TELEPHONE (OUTPATIENT)
Dept: OTOLARYNGOLOGY | Facility: CLINIC | Age: 39
End: 2019-05-09

## 2019-05-09 NOTE — TELEPHONE ENCOUNTER
LMTCB- please inform pt that prior Scott Rodriguezmarlene has been approved , #[id number] and expires 05/30/19.  Pt to contact insurance to verify benefits and out pocket cost.

## 2019-05-10 RX ORDER — FLUTICASONE PROPIONATE AND SALMETEROL 250; 50 UG/1; UG/1
1 POWDER RESPIRATORY (INHALATION) EVERY 12 HOURS SCHEDULED
Qty: 3 EACH | Refills: 1 | Status: SHIPPED | OUTPATIENT
Start: 2019-05-10 | End: 2019-10-28

## 2019-05-26 RX ORDER — MONTELUKAST SODIUM 10 MG/1
TABLET ORAL
Qty: 90 TABLET | Refills: 1 | Status: SHIPPED | OUTPATIENT
Start: 2019-05-26 | End: 2019-09-16

## 2019-05-26 NOTE — TELEPHONE ENCOUNTER
Refilled per protocol  Refill Protocol Appointment Criteria  · Appointment scheduled in the past 6 months or in the next 3 months  Recent Outpatient Visits            3 weeks ago Chronic sinusitis, unspecified location    [facility],

## 2019-06-05 DIAGNOSIS — R09.81 NASAL CONGESTION: ICD-10-CM

## 2019-06-05 DIAGNOSIS — J34.3 HYPERTROPHY OF NASAL TURBINATES: ICD-10-CM

## 2019-06-05 RX ORDER — LEVOCETIRIZINE DIHYDROCHLORIDE 5 MG/1
5 TABLET, FILM COATED ORAL EVERY EVENING
Qty: 90 TABLET | Refills: 1 | Status: SHIPPED | OUTPATIENT
Start: 2019-06-05 | End: 2019-09-16

## 2019-06-06 NOTE — TELEPHONE ENCOUNTER
Refill passed per [facility] protocol. Requested Prescriptions   Pending Prescriptions Disp Refills   • Levocetirizine Dihydrochloride (XYZAL) 5 MG Oral Tab 30 tablet 5     Sig: Take 1 tablet (5 mg total) by mouth every evening.        Allergy Medic

## 2019-06-13 ENCOUNTER — TELEPHONE (OUTPATIENT)
Dept: GASTROENTEROLOGY | Facility: CLINIC | Age: 39
End: 2019-06-13

## 2019-06-13 NOTE — TELEPHONE ENCOUNTER
----- Message from Abigail Rosado RN sent at 3/8/2019  8:48 AM CST -----  Regarding: recall EGD  Pt cancelled his EGD for 04/18/19. He did not want to reschedule at this time. He states he will call back when he is ready.  Recall placed for 3 months p

## 2019-06-14 DIAGNOSIS — J34.3 HYPERTROPHY OF NASAL TURBINATES: ICD-10-CM

## 2019-06-14 DIAGNOSIS — R09.81 NASAL CONGESTION: ICD-10-CM

## 2019-06-14 RX ORDER — LEVOCETIRIZINE DIHYDROCHLORIDE 5 MG/1
5 TABLET, FILM COATED ORAL EVERY EVENING
Qty: 90 TABLET | Refills: 1 | OUTPATIENT
Start: 2019-06-14

## 2019-06-19 ENCOUNTER — OFFICE VISIT (OUTPATIENT)
Dept: FAMILY MEDICINE CLINIC | Facility: CLINIC | Age: 39
End: 2019-06-19
Payer: COMMERCIAL

## 2019-06-19 VITALS
SYSTOLIC BLOOD PRESSURE: 129 MMHG | TEMPERATURE: 98 F | RESPIRATION RATE: 18 BRPM | HEIGHT: 68 IN | DIASTOLIC BLOOD PRESSURE: 86 MMHG | BODY MASS INDEX: 39.56 KG/M2 | HEART RATE: 72 BPM | WEIGHT: 261 LBS

## 2019-06-19 DIAGNOSIS — J45.30 MILD PERSISTENT ASTHMA WITHOUT COMPLICATION: Primary | ICD-10-CM

## 2019-06-19 DIAGNOSIS — R09.81 NASAL CONGESTION: ICD-10-CM

## 2019-06-19 PROCEDURE — 99213 OFFICE O/P EST LOW 20 MIN: CPT | Performed by: FAMILY MEDICINE

## 2019-06-19 PROCEDURE — 99212 OFFICE O/P EST SF 10 MIN: CPT | Performed by: FAMILY MEDICINE

## 2019-06-19 RX ORDER — PROMETHAZINE HYDROCHLORIDE AND CODEINE PHOSPHATE 6.25; 1 MG/5ML; MG/5ML
5 SYRUP ORAL EVERY 6 HOURS PRN
Qty: 180 ML | Refills: 0 | Status: SHIPPED | OUTPATIENT
Start: 2019-06-19 | End: 2019-11-23

## 2019-06-19 NOTE — PROGRESS NOTES
Blood pressure 129/86, pulse 72, temperature 98.3 °F (36.8 °C), temperature source Oral, resp. rate 18, height 5' 8\" (1.727 m), weight 261 lb (118.4 kg). 4-day history of cough with yellow phlegm nasal congestion some watery itchy eyes.   No fever no ch

## 2019-06-20 ENCOUNTER — PATIENT MESSAGE (OUTPATIENT)
Dept: FAMILY MEDICINE CLINIC | Facility: CLINIC | Age: 39
End: 2019-06-20

## 2019-06-21 ENCOUNTER — TELEPHONE (OUTPATIENT)
Dept: FAMILY MEDICINE CLINIC | Facility: CLINIC | Age: 39
End: 2019-06-21

## 2019-06-21 NOTE — TELEPHONE ENCOUNTER
LMTCB transfer to triage  Advised to call triage on MyChart    ----- Message from Sharron Villa sent at 6/20/2019 11:03 PM CDT -----  Regarding: Visit Follow-up Question  Contact: [phone]  Hi doctor , I still don’t feel good my chest hurts and

## 2019-06-21 NOTE — TELEPHONE ENCOUNTER
From: Tiff Mcconnell  To: Suzie Dee DO  Sent: 6/20/2019 11:03 PM CDT  Subject: Visit Follow-up Question    Hi doctor , I still don’t feel good my chest hurts and I have a 102 fever. I am all congested.

## 2019-06-21 NOTE — TELEPHONE ENCOUNTER
Patient had  OV on 6/19/19 with [facility]. States recently had  sinus infection. Was told to call back if not better . Reports chest hurting  When he coughs, fever 102 last night , no fever so far today.     Has productive cough with yellow sputum, nasal drainag

## 2019-06-22 RX ORDER — CIPROFLOXACIN 500 MG/1
500 TABLET, FILM COATED ORAL 2 TIMES DAILY
Qty: 20 TABLET | Refills: 0 | Status: SHIPPED | OUTPATIENT
Start: 2019-06-22 | End: 2019-11-23

## 2019-06-29 DIAGNOSIS — J34.3 HYPERTROPHY OF NASAL TURBINATES: ICD-10-CM

## 2019-06-29 DIAGNOSIS — R09.81 NASAL CONGESTION: ICD-10-CM

## 2019-07-01 RX ORDER — LEVOCETIRIZINE DIHYDROCHLORIDE 5 MG/1
5 TABLET, FILM COATED ORAL EVERY EVENING
Qty: 90 TABLET | Refills: 1 | OUTPATIENT
Start: 2019-07-01

## 2019-07-01 NOTE — TELEPHONE ENCOUNTER
Per our medication record ,it was esent to [facility] on 6/5/19 with 90 tabs+ 1 refill. Called [facility] and spoke with Ruben Jain, who states that patient picked up the last prescription on 6/4/19 and no new order received, phoned in prescription today=[email or web address].

## 2019-08-22 ENCOUNTER — NURSE TRIAGE (OUTPATIENT)
Dept: OTHER | Age: 39
End: 2019-08-22

## 2019-08-22 NOTE — TELEPHONE ENCOUNTER
Action Requested: Summary for Provider     []  Critical Lab, Recommendations Needed  [] Need Additional Advice  []   FYI    []   Need Orders  [] Need Medications Sent to Pharmacy  []  Other     SUMMARY:    I offered an appointment but he would like Dr. Pb Leal

## 2019-08-23 ENCOUNTER — HOSPITAL ENCOUNTER (OUTPATIENT)
Age: 39
Discharge: HOME | End: 2019-08-23
Payer: COMMERCIAL

## 2019-08-23 VITALS
HEIGHT: 69 IN | BODY MASS INDEX: 35.55 KG/M2 | WEIGHT: 240 LBS | HEART RATE: 79 BPM | OXYGEN SATURATION: 96 % | DIASTOLIC BLOOD PRESSURE: 90 MMHG | TEMPERATURE: 99 F | RESPIRATION RATE: 20 BRPM | SYSTOLIC BLOOD PRESSURE: 160 MMHG

## 2019-08-23 DIAGNOSIS — M54.41 ACUTE RIGHT-SIDED LOW BACK PAIN WITH RIGHT-SIDED SCIATICA: Primary | ICD-10-CM

## 2019-08-23 PROCEDURE — 99213 OFFICE O/P EST LOW 20 MIN: CPT

## 2019-08-23 PROCEDURE — 99214 OFFICE O/P EST MOD 30 MIN: CPT

## 2019-08-23 RX ORDER — METHYLPREDNISOLONE 4 MG/1
TABLET ORAL
Qty: 1 PACKAGE | Refills: 0 | Status: SHIPPED | OUTPATIENT
Start: 2019-08-23 | End: 2019-11-23

## 2019-08-23 RX ORDER — CYCLOBENZAPRINE HCL 10 MG
10 TABLET ORAL 3 TIMES DAILY PRN
Qty: 20 TABLET | Refills: 0 | Status: SHIPPED | OUTPATIENT
Start: 2019-08-23 | End: 2019-08-30

## 2019-08-23 NOTE — TELEPHONE ENCOUNTER
Offered patient an appointment with Angie Goel for 8/26, but pt stated he did not want to wait that long and did not understand why Dr. Skip Anton wouldn't just \"order a test for me. \"    Offered same day with another provider, but patient stated he would

## 2019-08-23 NOTE — ED PROVIDER NOTES
Patient presents with:  Leg Pain      HPI:     Cassie Tobin is a 46year old male with a t past medical history of asthma, HTN, hyperlipidemia, GERD presents with a chief complaint of R low back pain. Pt reports the symptoms started one week ago.  Isabella Marie time. Pt to follow up with primary care provider and return for any other concerns. Pt cannot take NSAIDS so I will prescribe medrol dose dustin and flexeril. Pt verbalized plan of care and states understanding.      Orders Placed This Encounter      Triston Elias

## 2019-08-30 ENCOUNTER — TELEPHONE (OUTPATIENT)
Dept: FAMILY MEDICINE CLINIC | Facility: CLINIC | Age: 39
End: 2019-08-30

## 2019-08-30 NOTE — TELEPHONE ENCOUNTER
Pt stated he finished medrol dose dustin ( 8/23/19), the muscle relaxer on puts him asleep  Stated he took tylenol #3 from an old rx by his Dentist and helped more than anything, Ibuprofen affects his asthma only taking plain tylenols now  Unable to come fo

## 2019-08-30 NOTE — TELEPHONE ENCOUNTER
----- Message from Sally Villa sent at 8/29/2019  7:32 PM CDT -----  Regarding: Visit Follow-up Question  Contact: [phone]  I went to the  immediate care and they gave me prednisone and a muscle relaxer , they said I have   Sciatic nerve.  Roshni Roman

## 2019-08-31 NOTE — TELEPHONE ENCOUNTER
Followed up with patient, is unable to come in for appt today, requested follow up with Dr [facility] next week after 5pm, advised no late appts next week, pt will call back Tue 9/3 to schedule follow up appt. Refused available appt Tues with  [facility].

## 2019-09-16 DIAGNOSIS — R09.81 NASAL CONGESTION: ICD-10-CM

## 2019-09-16 DIAGNOSIS — J45.30 MILD PERSISTENT ASTHMA WITHOUT COMPLICATION: ICD-10-CM

## 2019-09-16 DIAGNOSIS — J34.3 HYPERTROPHY OF NASAL TURBINATES: ICD-10-CM

## 2019-09-16 DIAGNOSIS — I10 ESSENTIAL HYPERTENSION: ICD-10-CM

## 2019-09-18 RX ORDER — LOSARTAN POTASSIUM 50 MG/1
50 TABLET ORAL
Qty: 90 TABLET | Refills: 3 | Status: SHIPPED | OUTPATIENT
Start: 2019-09-18 | End: 2020-09-28

## 2019-09-18 RX ORDER — FLUTICASONE PROPIONATE 50 MCG
SPRAY, SUSPENSION (ML) NASAL
Qty: 3 BOTTLE | Refills: 3 | Status: SHIPPED | OUTPATIENT
Start: 2019-09-18 | End: 2020-01-14 | Stop reason: ALTCHOICE

## 2019-09-18 RX ORDER — MONTELUKAST SODIUM 10 MG/1
10 TABLET ORAL NIGHTLY
Qty: 90 TABLET | Refills: 1 | Status: SHIPPED | OUTPATIENT
Start: 2019-09-18 | End: 2020-06-16

## 2019-09-18 RX ORDER — LEVOCETIRIZINE DIHYDROCHLORIDE 5 MG/1
5 TABLET, FILM COATED ORAL EVERY EVENING
Qty: 90 TABLET | Refills: 1 | Status: SHIPPED | OUTPATIENT
Start: 2019-09-18 | End: 2019-12-21

## 2019-09-18 NOTE — TELEPHONE ENCOUNTER
Refill passed per [facility] protocol.     Hypertensive Medications  Protocol Criteria:  · Appointment scheduled in the past 6 months or in the next 3 months  · BMP or CMP in the past 12 months  · Creatinine result < 2  Recent Outpatient Visits location    [facility] for Ayesha Holland MD    Office Visit    4 months ago Allergic rhinitis, unspecified seasonality, unspecified trigger    Wilhelm Shirts, [place] Dial, MD    Office

## 2019-09-18 NOTE — TELEPHONE ENCOUNTER
Patient passes protocol, but please review warning below and advise. Script pended. Allergy/Contraindication: Rosuvastatin Calcium   No reactions specified. No reaction type specified. User documented allergy severity: None specified.    Level 2 with ENA

## 2019-09-19 RX ORDER — ROSUVASTATIN CALCIUM 5 MG/1
5 TABLET, COATED ORAL NIGHTLY
Qty: 90 TABLET | Refills: 3 | Status: SHIPPED | OUTPATIENT
Start: 2019-09-19 | End: 2019-12-21

## 2019-10-26 ENCOUNTER — IMMUNIZATION (OUTPATIENT)
Dept: PEDIATRICS CLINIC | Facility: CLINIC | Age: 39
End: 2019-10-26
Payer: COMMERCIAL

## 2019-10-26 DIAGNOSIS — Z23 NEED FOR VACCINATION: ICD-10-CM

## 2019-10-26 PROCEDURE — 90686 IIV4 VACC NO PRSV 0.5 ML IM: CPT | Performed by: PEDIATRICS

## 2019-10-26 PROCEDURE — 90471 IMMUNIZATION ADMIN: CPT | Performed by: PEDIATRICS

## 2019-10-28 DIAGNOSIS — R09.81 NASAL CONGESTION: ICD-10-CM

## 2019-10-28 DIAGNOSIS — J34.3 HYPERTROPHY OF NASAL TURBINATES: ICD-10-CM

## 2019-10-28 DIAGNOSIS — I10 ESSENTIAL HYPERTENSION: ICD-10-CM

## 2019-10-29 RX ORDER — FLUTICASONE PROPIONATE AND SALMETEROL 250; 50 UG/1; UG/1
1 POWDER RESPIRATORY (INHALATION) EVERY 12 HOURS SCHEDULED
Qty: 3 EACH | Refills: 1 | Status: SHIPPED | OUTPATIENT
Start: 2019-10-29 | End: 2020-05-04

## 2019-10-31 RX ORDER — LOSARTAN POTASSIUM 50 MG/1
50 TABLET ORAL
Qty: 90 TABLET | Refills: 3 | OUTPATIENT
Start: 2019-10-31

## 2019-10-31 RX ORDER — LEVOCETIRIZINE DIHYDROCHLORIDE 5 MG/1
5 TABLET, FILM COATED ORAL EVERY EVENING
Qty: 90 TABLET | Refills: 1 | OUTPATIENT
Start: 2019-10-31

## 2019-10-31 RX ORDER — MONTELUKAST SODIUM 10 MG/1
10 TABLET ORAL NIGHTLY
Qty: 90 TABLET | Refills: 1 | OUTPATIENT
Start: 2019-10-31

## 2019-10-31 RX ORDER — ROSUVASTATIN CALCIUM 5 MG/1
5 TABLET, COATED ORAL NIGHTLY
Qty: 90 TABLET | Refills: 3 | OUTPATIENT
Start: 2019-10-31

## 2019-11-23 ENCOUNTER — OFFICE VISIT (OUTPATIENT)
Dept: FAMILY MEDICINE CLINIC | Facility: CLINIC | Age: 39
End: 2019-11-23
Payer: COMMERCIAL

## 2019-11-23 VITALS
TEMPERATURE: 98 F | HEIGHT: 69 IN | SYSTOLIC BLOOD PRESSURE: 137 MMHG | HEART RATE: 73 BPM | DIASTOLIC BLOOD PRESSURE: 87 MMHG | WEIGHT: 265 LBS | BODY MASS INDEX: 39.25 KG/M2

## 2019-11-23 DIAGNOSIS — J45.30 MILD PERSISTENT ASTHMA WITHOUT COMPLICATION: Primary | ICD-10-CM

## 2019-11-23 DIAGNOSIS — J01.01 ACUTE RECURRENT MAXILLARY SINUSITIS: ICD-10-CM

## 2019-11-23 PROCEDURE — 99213 OFFICE O/P EST LOW 20 MIN: CPT | Performed by: FAMILY MEDICINE

## 2019-11-23 RX ORDER — TRIAMCINOLONE ACETONIDE 55 UG/1
2 SPRAY, METERED NASAL DAILY
Qty: 1 INHALER | Refills: 0 | Status: SHIPPED | OUTPATIENT
Start: 2019-11-23 | End: 2020-01-19

## 2019-11-23 RX ORDER — AMOXICILLIN AND CLAVULANATE POTASSIUM 875; 125 MG/1; MG/1
1 TABLET, FILM COATED ORAL 2 TIMES DAILY
Qty: 14 TABLET | Refills: 0 | Status: SHIPPED | OUTPATIENT
Start: 2019-11-23 | End: 2019-11-30

## 2019-11-23 RX ORDER — ALBUTEROL SULFATE 90 UG/1
2 AEROSOL, METERED RESPIRATORY (INHALATION)
COMMUNITY
Start: 2011-09-28 | End: 2019-11-23

## 2019-11-23 NOTE — PROGRESS NOTES
HPI:    Patient ID: Romeo Mcghee is a 46year old male. HPI  Patient presents with:  Nasal Congestion  Cough    Review of Systems   Constitutional: Negative. HENT: Positive for congestion and ear pain. Respiratory: Negative.              Cu Constitutional: He appears well-developed. HENT:   Head: Normocephalic. Right Ear: Tympanic membrane normal.   Left Ear: Tympanic membrane normal.   Nose: Mucosal edema and rhinorrhea present. Right sinus exhibits maxillary sinus tenderness.  Left sin

## 2019-12-02 ENCOUNTER — TELEPHONE (OUTPATIENT)
Dept: OTHER | Age: 39
End: 2019-12-02

## 2019-12-02 NOTE — TELEPHONE ENCOUNTER
Pt report nasal congestion is worse. Pt states he was seen and given antibiotics. Pt states he finished the antibiotics and using nose spray. Would like to know what provider suggest. Please advise.

## 2019-12-04 DIAGNOSIS — J34.3 HYPERTROPHY OF NASAL TURBINATES: ICD-10-CM

## 2019-12-04 DIAGNOSIS — I10 ESSENTIAL HYPERTENSION: ICD-10-CM

## 2019-12-04 DIAGNOSIS — R09.81 NASAL CONGESTION: ICD-10-CM

## 2019-12-04 RX ORDER — CIPROFLOXACIN 500 MG/1
500 TABLET, FILM COATED ORAL 2 TIMES DAILY
Qty: 20 TABLET | Refills: 0 | Status: SHIPPED | OUTPATIENT
Start: 2019-12-04 | End: 2020-01-14 | Stop reason: ALTCHOICE

## 2019-12-04 NOTE — TELEPHONE ENCOUNTER
LMTCB  Luis Alberto Navarro, DO  You 1 hour ago (1:48 PM)      OTC Sudafed and plain mucinex.     Routing comment

## 2019-12-04 NOTE — TELEPHONE ENCOUNTER
Informed patient of Dr. Arden Carter advice as per below, but patient states those OTC meds don't work and stated \"my PCP is Dr. Chuy Vines and he knows I get these symptoms a lot. \"    Informed patient I would route to Dr. Meir Sims.

## 2019-12-04 NOTE — TELEPHONE ENCOUNTER
Pt calling for update. Please advise regarding pt c/o nasal congestion. Pt states nothing OTC is helping.

## 2019-12-06 RX ORDER — ROSUVASTATIN CALCIUM 5 MG/1
5 TABLET, COATED ORAL NIGHTLY
Qty: 90 TABLET | Refills: 3 | OUTPATIENT
Start: 2019-12-06

## 2019-12-06 RX ORDER — LOSARTAN POTASSIUM 50 MG/1
50 TABLET ORAL
Qty: 90 TABLET | Refills: 3 | OUTPATIENT
Start: 2019-12-06

## 2019-12-06 RX ORDER — MONTELUKAST SODIUM 10 MG/1
10 TABLET ORAL NIGHTLY
Qty: 90 TABLET | Refills: 1 | OUTPATIENT
Start: 2019-12-06

## 2019-12-06 RX ORDER — LEVOCETIRIZINE DIHYDROCHLORIDE 5 MG/1
5 TABLET, FILM COATED ORAL EVERY EVENING
Qty: 90 TABLET | Refills: 1 | OUTPATIENT
Start: 2019-12-06

## 2019-12-14 ENCOUNTER — OFFICE VISIT (OUTPATIENT)
Dept: OTOLARYNGOLOGY | Facility: CLINIC | Age: 39
End: 2019-12-14
Payer: COMMERCIAL

## 2019-12-14 VITALS
SYSTOLIC BLOOD PRESSURE: 137 MMHG | BODY MASS INDEX: 39.25 KG/M2 | HEIGHT: 69 IN | WEIGHT: 265 LBS | TEMPERATURE: 98 F | DIASTOLIC BLOOD PRESSURE: 80 MMHG

## 2019-12-14 DIAGNOSIS — J45.909 SAMTER'S TRIAD: ICD-10-CM

## 2019-12-14 DIAGNOSIS — J33.9 SAMTER'S TRIAD: ICD-10-CM

## 2019-12-14 DIAGNOSIS — Z88.6 SAMTER'S TRIAD: ICD-10-CM

## 2019-12-14 DIAGNOSIS — J34.89 NASAL OBSTRUCTION: ICD-10-CM

## 2019-12-14 DIAGNOSIS — Z91.09 ENVIRONMENTAL ALLERGIES: ICD-10-CM

## 2019-12-14 DIAGNOSIS — J33.9 NASAL POLYPS: Primary | ICD-10-CM

## 2019-12-14 PROCEDURE — 99214 OFFICE O/P EST MOD 30 MIN: CPT | Performed by: OTOLARYNGOLOGY

## 2019-12-14 RX ORDER — PREDNISONE 10 MG/1
TABLET ORAL
Qty: 29 TABLET | Refills: 0 | Status: SHIPPED | OUTPATIENT
Start: 2019-12-14 | End: 2020-01-14

## 2019-12-14 NOTE — PROGRESS NOTES
Carol Self is a 46year old male. Patient presents with:  Sinus Problem: Pt reports nasal congestion x 2 month.  Currently taking Ciprofloxacin and allegy meds with no resolve      HISTORY OF PRESENT ILLNESS  12/14/2019  Patient is very miserable Performed by Cinthia Acosta MD at [address] [place] [address] Neg/Pos Details   Constitutional Negative Fatigue, fever and weight loss. ENMT Negative Drooling. Eyes Negative Blurred vision and vision changes.    Res Oral Tab, Pt to take 40 mg PO daily for 4 days, 30 mg PO daily for 3 days, 20 mg PO daily for 2 days, Disp: 29 tablet, Rfl: 0  •  Ciprofloxacin HCl 500 MG Oral Tab, Take 1 tablet (500 mg total) by mouth 2 (two) times daily. , Disp: 20 tablet, Rfl: 0  •  Tri management and recurrent sinus surgeries. He is ready on allergy medication so I actually asked him to continue these I started steroids and also ordered a CAT scan of the sinuses.   I asked him to follow-up after his CAT scan The risks of steroids were [facility]

## 2019-12-21 DIAGNOSIS — J34.3 HYPERTROPHY OF NASAL TURBINATES: ICD-10-CM

## 2019-12-21 DIAGNOSIS — R09.81 NASAL CONGESTION: ICD-10-CM

## 2019-12-23 RX ORDER — LEVOCETIRIZINE DIHYDROCHLORIDE 5 MG/1
5 TABLET, FILM COATED ORAL EVERY EVENING
Qty: 90 TABLET | Refills: 1 | Status: SHIPPED | OUTPATIENT
Start: 2019-12-23 | End: 2020-06-16

## 2019-12-23 RX ORDER — ROSUVASTATIN CALCIUM 5 MG/1
5 TABLET, COATED ORAL NIGHTLY
Qty: 90 TABLET | Refills: 1 | Status: SHIPPED | OUTPATIENT
Start: 2019-12-23 | End: 2020-09-28

## 2019-12-24 NOTE — TELEPHONE ENCOUNTER
Refill passed per [facility] protocol.   Cholesterol Medications  Protocol Criteria:  · Appointment scheduled in the past 12 months or in the next 3 months  · ALT & LDL on file in the past 12 months  · ALT result < 80  · LDL result <130   Recent Outpat Nikhil Silva MD    Office Visit

## 2019-12-31 ENCOUNTER — TELEPHONE (OUTPATIENT)
Dept: OTOLARYNGOLOGY | Facility: CLINIC | Age: 39
End: 2019-12-31

## 2020-01-04 ENCOUNTER — HOSPITAL ENCOUNTER (OUTPATIENT)
Dept: CT IMAGING | Facility: HOSPITAL | Age: 40
Discharge: HOME | End: 2020-01-04
Attending: OTOLARYNGOLOGY
Payer: COMMERCIAL

## 2020-01-04 DIAGNOSIS — J33.9 NASAL POLYPS: ICD-10-CM

## 2020-01-04 PROCEDURE — 70486 CT MAXILLOFACIAL W/O DYE: CPT | Performed by: OTOLARYNGOLOGY

## 2020-01-06 ENCOUNTER — OFFICE VISIT (OUTPATIENT)
Dept: OTOLARYNGOLOGY | Facility: CLINIC | Age: 40
End: 2020-01-06
Payer: COMMERCIAL

## 2020-01-06 VITALS
HEIGHT: 69 IN | DIASTOLIC BLOOD PRESSURE: 85 MMHG | BODY MASS INDEX: 39.25 KG/M2 | SYSTOLIC BLOOD PRESSURE: 139 MMHG | WEIGHT: 265 LBS | HEART RATE: 85 BPM

## 2020-01-06 DIAGNOSIS — J32.4 CHRONIC PANSINUSITIS: ICD-10-CM

## 2020-01-06 DIAGNOSIS — J45.909 SAMTER'S TRIAD: ICD-10-CM

## 2020-01-06 DIAGNOSIS — J33.9 SAMTER'S TRIAD: ICD-10-CM

## 2020-01-06 DIAGNOSIS — J33.9 NASAL POLYPS: Primary | ICD-10-CM

## 2020-01-06 DIAGNOSIS — Z88.6 SAMTER'S TRIAD: ICD-10-CM

## 2020-01-06 PROCEDURE — 99214 OFFICE O/P EST MOD 30 MIN: CPT | Performed by: OTOLARYNGOLOGY

## 2020-01-06 RX ORDER — PREDNISONE 10 MG/1
TABLET ORAL
Qty: 21 TABLET | Refills: 0 | Status: SHIPPED | OUTPATIENT
Start: 2020-01-06 | End: 2020-02-06

## 2020-01-07 ENCOUNTER — TELEPHONE (OUTPATIENT)
Dept: FAMILY MEDICINE CLINIC | Facility: CLINIC | Age: 40
End: 2020-01-07

## 2020-01-07 DIAGNOSIS — Z01.810 PREOPERATIVE CARDIOVASCULAR EXAMINATION: Primary | ICD-10-CM

## 2020-01-07 DIAGNOSIS — K20.0 EOSINOPHILIC ESOPHAGITIS: ICD-10-CM

## 2020-01-07 DIAGNOSIS — E78.49 OTHER HYPERLIPIDEMIA: ICD-10-CM

## 2020-01-07 DIAGNOSIS — I10 ESSENTIAL HYPERTENSION: ICD-10-CM

## 2020-01-07 DIAGNOSIS — J45.40 MODERATE PERSISTENT ASTHMA WITHOUT COMPLICATION: ICD-10-CM

## 2020-01-07 NOTE — TELEPHONE ENCOUNTER
Patient scheduled for preop visit on January 14, 2020 at 5:20 pm at [facility] office. He will have labs/EKG completed prior to appointment.

## 2020-01-07 NOTE — TELEPHONE ENCOUNTER
Anthony Chan M, DO  P Em Fm [facility] Lpn/Cma             Labs ekg etc ordered. Needs OV for pre-op clearance      Tried to reach patient to inform of [facility]. Please schedule pt for pre-op appt with Dana Titus.     Pt to have fasting labs a

## 2020-01-07 NOTE — PROGRESS NOTES
Janie Self is a 46year old male.   Patient presents with:  Test Results: Discuss CT scan results       HISTORY OF PRESENT ILLNESS  12/14/2019  Patient is very miserable he never breathes through his nose he feels like is worse the only thing this Nasal polyps    • Problems with swallowing    • Sleep apnea        Past Surgical History:   Procedure Laterality Date   • ESOPHAGOGASTRODUODENOSCOPY (EGD) N/A 4/5/2017    Performed by Lex Bright MD at [address] Supraclavicular. Nose/Mouth/Throat abNormal Nares - Right: Normal Left: Normal. Septum deviated with turbinate hypertrophy bilaterally mucosa congestion.  Polyps are noted in bilateral nasal cavity   CAT scan images were reviewed in detail with Ciprofloxacin HCl 500 MG Oral Tab, Take 1 tablet (500 mg total) by mouth 2 (two) times daily.  (Patient not taking: Reported on 1/6/2020 ), Disp: 20 tablet, Rfl: 0  •  Fluticasone Propionate 50 MCG/ACT Nasal Suspension, USE 2 SPRAYS IN EACH NOSTRIL DAILY (P proceed. I will asked Dr. Francine Mosher for medical clearance.                     Delta Garcia MD

## 2020-01-11 ENCOUNTER — HOSPITAL ENCOUNTER (OUTPATIENT)
Dept: GENERAL RADIOLOGY | Age: 40
Discharge: HOME | End: 2020-01-11
Attending: FAMILY MEDICINE
Payer: COMMERCIAL

## 2020-01-11 ENCOUNTER — APPOINTMENT (OUTPATIENT)
Dept: LAB | Age: 40
End: 2020-01-11
Attending: FAMILY MEDICINE
Payer: COMMERCIAL

## 2020-01-11 ENCOUNTER — LAB ENCOUNTER (OUTPATIENT)
Dept: LAB | Age: 40
End: 2020-01-11
Attending: FAMILY MEDICINE
Payer: COMMERCIAL

## 2020-01-11 DIAGNOSIS — Z01.810 PREOPERATIVE CARDIOVASCULAR EXAMINATION: ICD-10-CM

## 2020-01-11 DIAGNOSIS — E78.49 OTHER HYPERLIPIDEMIA: ICD-10-CM

## 2020-01-11 DIAGNOSIS — I10 ESSENTIAL HYPERTENSION: ICD-10-CM

## 2020-01-11 LAB
ANION GAP SERPL CALC-SCNC: 5 MMOL/L (ref 0–18)
APTT PPP: 27.8 SECONDS (ref 23.2–35.3)
BASOPHILS # BLD AUTO: 0.07 X10(3) UL (ref 0–0.2)
BASOPHILS NFR BLD AUTO: 0.7 %
BUN BLD-MCNC: 13 MG/DL (ref 7–18)
BUN/CREAT SERPL: 18.1 (ref 10–20)
CALCIUM BLD-MCNC: 8.7 MG/DL (ref 8.5–10.1)
CHLORIDE SERPL-SCNC: 107 MMOL/L (ref 98–112)
CHOLEST SMN-MCNC: 157 MG/DL (ref ?–200)
CO2 SERPL-SCNC: 29 MMOL/L (ref 21–32)
CREAT BLD-MCNC: 0.72 MG/DL (ref 0.7–1.3)
DEPRECATED RDW RBC AUTO: 38 FL (ref 35.1–46.3)
EOSINOPHIL # BLD AUTO: 0.09 X10(3) UL (ref 0–0.7)
EOSINOPHIL NFR BLD AUTO: 0.9 %
ERYTHROCYTE [DISTWIDTH] IN BLOOD BY AUTOMATED COUNT: 12.7 % (ref 11–15)
GLUCOSE BLD-MCNC: 91 MG/DL (ref 70–99)
HCT VFR BLD AUTO: 45 % (ref 39–53)
HDLC SERPL-MCNC: 36 MG/DL (ref 40–59)
HGB BLD-MCNC: 14.6 G/DL (ref 13–17.5)
IMM GRANULOCYTES # BLD AUTO: 0.02 X10(3) UL (ref 0–1)
IMM GRANULOCYTES NFR BLD: 0.2 %
INR BLD: 0.99 (ref 0.9–1.2)
LDLC SERPL CALC-MCNC: 88 MG/DL (ref ?–100)
LYMPHOCYTES # BLD AUTO: 2.93 X10(3) UL (ref 1–4)
LYMPHOCYTES NFR BLD AUTO: 30.6 %
MCH RBC QN AUTO: 27.2 PG (ref 26–34)
MCHC RBC AUTO-ENTMCNC: 32.4 G/DL (ref 31–37)
MCV RBC AUTO: 83.8 FL (ref 80–100)
MONOCYTES # BLD AUTO: 0.55 X10(3) UL (ref 0.1–1)
MONOCYTES NFR BLD AUTO: 5.7 %
NEUTROPHILS # BLD AUTO: 5.92 X10 (3) UL (ref 1.5–7.7)
NEUTROPHILS # BLD AUTO: 5.92 X10(3) UL (ref 1.5–7.7)
NEUTROPHILS NFR BLD AUTO: 61.9 %
NONHDLC SERPL-MCNC: 121 MG/DL (ref ?–130)
OSMOLALITY SERPL CALC.SUM OF ELEC: 292 MOSM/KG (ref 275–295)
PATIENT FASTING Y/N/NP: YES
PATIENT FASTING Y/N/NP: YES
PLATELET # BLD AUTO: 264 10(3)UL (ref 150–450)
POTASSIUM SERPL-SCNC: 4.1 MMOL/L (ref 3.5–5.1)
PROTHROMBIN TIME: 12.9 SECONDS (ref 11.8–14.5)
RBC # BLD AUTO: 5.37 X10(6)UL (ref 4.3–5.7)
SODIUM SERPL-SCNC: 141 MMOL/L (ref 136–145)
TRIGL SERPL-MCNC: 166 MG/DL (ref 30–149)
VLDLC SERPL CALC-MCNC: 33 MG/DL (ref 0–30)
WBC # BLD AUTO: 9.6 X10(3) UL (ref 4–11)

## 2020-01-11 PROCEDURE — 93005 ELECTROCARDIOGRAM TRACING: CPT

## 2020-01-11 PROCEDURE — 85730 THROMBOPLASTIN TIME PARTIAL: CPT

## 2020-01-11 PROCEDURE — 85610 PROTHROMBIN TIME: CPT

## 2020-01-11 PROCEDURE — 93010 ELECTROCARDIOGRAM REPORT: CPT | Performed by: FAMILY MEDICINE

## 2020-01-11 PROCEDURE — 71046 X-RAY EXAM CHEST 2 VIEWS: CPT | Performed by: FAMILY MEDICINE

## 2020-01-11 PROCEDURE — 36415 COLL VENOUS BLD VENIPUNCTURE: CPT

## 2020-01-11 PROCEDURE — 80061 LIPID PANEL: CPT

## 2020-01-11 PROCEDURE — 85025 COMPLETE CBC W/AUTO DIFF WBC: CPT

## 2020-01-11 PROCEDURE — 80048 BASIC METABOLIC PNL TOTAL CA: CPT

## 2020-01-14 ENCOUNTER — TELEPHONE (OUTPATIENT)
Dept: FAMILY MEDICINE CLINIC | Facility: CLINIC | Age: 40
End: 2020-01-14

## 2020-01-14 ENCOUNTER — OFFICE VISIT (OUTPATIENT)
Dept: FAMILY MEDICINE CLINIC | Facility: CLINIC | Age: 40
End: 2020-01-14
Payer: COMMERCIAL

## 2020-01-14 VITALS
SYSTOLIC BLOOD PRESSURE: 138 MMHG | HEART RATE: 80 BPM | BODY MASS INDEX: 39.84 KG/M2 | DIASTOLIC BLOOD PRESSURE: 88 MMHG | WEIGHT: 269 LBS | HEIGHT: 69 IN

## 2020-01-14 DIAGNOSIS — K21.9 GASTROESOPHAGEAL REFLUX DISEASE, ESOPHAGITIS PRESENCE NOT SPECIFIED: ICD-10-CM

## 2020-01-14 DIAGNOSIS — I10 ESSENTIAL HYPERTENSION: ICD-10-CM

## 2020-01-14 DIAGNOSIS — R09.81 NASAL CONGESTION: ICD-10-CM

## 2020-01-14 DIAGNOSIS — E66.9 OBESITY (BMI 35.0-39.9 WITHOUT COMORBIDITY): ICD-10-CM

## 2020-01-14 DIAGNOSIS — J34.3 HYPERTROPHY OF NASAL TURBINATES: Primary | ICD-10-CM

## 2020-01-14 DIAGNOSIS — J30.89 SEASONAL ALLERGIC RHINITIS DUE TO OTHER ALLERGIC TRIGGER: ICD-10-CM

## 2020-01-14 DIAGNOSIS — J45.30 MILD PERSISTENT ASTHMA WITHOUT COMPLICATION: ICD-10-CM

## 2020-01-14 DIAGNOSIS — E78.2 MIXED HYPERLIPIDEMIA: ICD-10-CM

## 2020-01-14 PROCEDURE — 99214 OFFICE O/P EST MOD 30 MIN: CPT | Performed by: FAMILY MEDICINE

## 2020-01-14 RX ORDER — ERGOCALCIFEROL 1.25 MG/1
50000 CAPSULE ORAL WEEKLY
COMMUNITY
Start: 2019-12-14 | End: 2020-05-27

## 2020-01-14 NOTE — PROGRESS NOTES
Blood pressure 138/88, pulse 80, height 5' 9\" (1.753 m), weight 269 lb (122 kg). REASON FOR VISIT:    Juan Bob is a 46year old male who presents for an [address] Preoperative physical for pansinusitis and Samter's triad. Gonorrhea Screening If high risk No results found for: GONOCOCCUS   HIV Screening For all adults age 22-65, older adults at increased risk No results found for: HIV   Syphilis Screening Screen if pregnant or high risk No results found for: RPR   Hep Activated Inhale 1 puff into the lungs every 12 (twelve) hours. 3 each 1   • losartan Potassium 50 MG Oral Tab Take 1 tablet (50 mg total) by mouth once daily. 90 tablet 3   • Montelukast Sodium 10 MG Oral Tab Take 1 tablet (10 mg total) by mouth nightly. abdominal pain, denies heartburn  : denies nocturia or changes in stream  MUSCULOSKELETAL: denies back pain  NEURO: denies headaches  PSYCHE: denies depression or anxiety  HEMATOLOGIC: denies hx of anemia  ENDOCRINE: denies thyroid history  ALL/ASTHMA: d then every 10 years   HPV Males 11-21   Zoster (Shingles) 60 and older: one dose   Varicella 2 doses if not immune   MMR 1-2 doses if born after 26 and not immune     1.  Hypertrophy of nasal turbinates  Patient is cleared for operation reviewed labs, gilberto

## 2020-01-17 ENCOUNTER — TELEPHONE (OUTPATIENT)
Dept: OTOLARYNGOLOGY | Facility: CLINIC | Age: 40
End: 2020-01-17

## 2020-01-17 NOTE — TELEPHONE ENCOUNTER
Called and spoke with patient. Patient is scheduled for surgery on 02/14/20. Prepost op/ NSAID instructions reviewed.

## 2020-01-20 RX ORDER — TRIAMCINOLONE ACETONIDE 55 UG/1
2 SPRAY, METERED NASAL DAILY
Qty: 1 INHALER | Refills: 1 | Status: SHIPPED | OUTPATIENT
Start: 2020-01-20 | End: 2020-09-01 | Stop reason: ALTCHOICE

## 2020-01-21 NOTE — TELEPHONE ENCOUNTER
pls advise, thanks in advance.        Refill Protocol Appointment Criteria  · Appointment scheduled in the past 12 months or in the next 3 months  Recent Outpatient Visits            6 days ago Hypertrophy of nasal turbinates    [facility]

## 2020-02-10 ENCOUNTER — TELEPHONE (OUTPATIENT)
Dept: OTOLARYNGOLOGY | Facility: CLINIC | Age: 40
End: 2020-02-10

## 2020-02-10 RX ORDER — PREDNISONE 20 MG/1
TABLET ORAL
Qty: 8 TABLET | Refills: 0 | Status: SHIPPED | OUTPATIENT
Start: 2020-02-10 | End: 2020-02-13

## 2020-02-10 NOTE — H&P
Fernando Horner is a 46year old male. No chief complaint on file.       HISTORY OF PRESENT ILLNESS  12/14/2019  Patient is very miserable he never breathes through his nose he feels like is worse the only thing this helped in the past is Afrin and st Hypertension    • Nasal polyps    • Problems with swallowing    • Sleep apnea    • Visual impairment     GLASSES       Past Surgical History:   Procedure Laterality Date   • ESOPHAGOGASTRODUODENOSCOPY (EGD) N/A 4/5/2017    Performed by Mary Alas cervical. Posterior cervical. Supraclavicular. Nose/Mouth/Throat abNormal Nares - Right: Normal Left: Normal. Septum deviated with turbinate hypertrophy bilaterally mucosa congestion.  Polyps are noted in bilateral nasal cavity   CAT scan image ASSESSMENT AND PLAN    1. Nasal polyps /samters triad    Discussed the fact that he probably has a ERD which is a new name for Samter's triad.   Typically patients have aspirin allergy nasal polyps asthma and require fairly aggressive allergy management a

## 2020-02-10 NOTE — TELEPHONE ENCOUNTER
Received a returned call from patient and informed of note below,advised pt to make sure to start taking the medication tonight ,advised to check if medication is ready,pt verbalized understanding.

## 2020-02-14 ENCOUNTER — HOSPITAL ENCOUNTER (OUTPATIENT)
Facility: HOSPITAL | Age: 40
Setting detail: HOSPITAL OUTPATIENT SURGERY
Discharge: HOME | End: 2020-02-14
Attending: OTOLARYNGOLOGY | Admitting: OTOLARYNGOLOGY
Payer: COMMERCIAL

## 2020-02-14 ENCOUNTER — ANESTHESIA EVENT (OUTPATIENT)
Dept: SURGERY | Facility: HOSPITAL | Age: 40
End: 2020-02-14
Payer: COMMERCIAL

## 2020-02-14 ENCOUNTER — ANESTHESIA (OUTPATIENT)
Dept: SURGERY | Facility: HOSPITAL | Age: 40
End: 2020-02-14
Payer: COMMERCIAL

## 2020-02-14 VITALS
WEIGHT: 266 LBS | TEMPERATURE: 98 F | SYSTOLIC BLOOD PRESSURE: 130 MMHG | HEIGHT: 69 IN | OXYGEN SATURATION: 97 % | HEART RATE: 64 BPM | DIASTOLIC BLOOD PRESSURE: 71 MMHG | RESPIRATION RATE: 18 BRPM | BODY MASS INDEX: 39.4 KG/M2

## 2020-02-14 DIAGNOSIS — J32.2 CHRONIC ETHMOIDAL SINUSITIS: ICD-10-CM

## 2020-02-14 DIAGNOSIS — J34.3 HYPERTROPHY OF NASAL TURBINATES: ICD-10-CM

## 2020-02-14 DIAGNOSIS — J32.0 CHRONIC MAXILLARY SINUSITIS: ICD-10-CM

## 2020-02-14 DIAGNOSIS — J32.1 CHRONIC FRONTAL SINUSITIS: ICD-10-CM

## 2020-02-14 DIAGNOSIS — J33.9 NOSE POLYP: ICD-10-CM

## 2020-02-14 PROCEDURE — 09DU4ZZ EXTRACTION OF RIGHT ETHMOID SINUS, PERCUTANEOUS ENDOSCOPIC APPROACH: ICD-10-PCS | Performed by: OTOLARYNGOLOGY

## 2020-02-14 PROCEDURE — 099R8ZZ DRAINAGE OF LEFT MAXILLARY SINUS, VIA NATURAL OR ARTIFICIAL OPENING ENDOSCOPIC: ICD-10-PCS | Performed by: OTOLARYNGOLOGY

## 2020-02-14 PROCEDURE — 30520 REPAIR OF NASAL SEPTUM: CPT | Performed by: OTOLARYNGOLOGY

## 2020-02-14 PROCEDURE — 30140 RESECT INFERIOR TURBINATE: CPT | Performed by: OTOLARYNGOLOGY

## 2020-02-14 PROCEDURE — 09SM4ZZ REPOSITION NASAL SEPTUM, PERCUTANEOUS ENDOSCOPIC APPROACH: ICD-10-PCS | Performed by: OTOLARYNGOLOGY

## 2020-02-14 PROCEDURE — 09JY8ZZ INSPECTION OF SINUS, VIA NATURAL OR ARTIFICIAL OPENING ENDOSCOPIC: ICD-10-PCS | Performed by: OTOLARYNGOLOGY

## 2020-02-14 PROCEDURE — 09DV4ZZ EXTRACTION OF LEFT ETHMOID SINUS, PERCUTANEOUS ENDOSCOPIC APPROACH: ICD-10-PCS | Performed by: OTOLARYNGOLOGY

## 2020-02-14 PROCEDURE — 61782 SCAN PROC CRANIAL EXTRA: CPT | Performed by: OTOLARYNGOLOGY

## 2020-02-14 PROCEDURE — 31267 ENDOSCOPY MAXILLARY SINUS: CPT | Performed by: OTOLARYNGOLOGY

## 2020-02-14 PROCEDURE — 31253 NSL/SINS NDSC TOTAL: CPT | Performed by: OTOLARYNGOLOGY

## 2020-02-14 PROCEDURE — 099Q8ZZ DRAINAGE OF RIGHT MAXILLARY SINUS, VIA NATURAL OR ARTIFICIAL OPENING ENDOSCOPIC: ICD-10-PCS | Performed by: OTOLARYNGOLOGY

## 2020-02-14 PROCEDURE — 09SL8ZZ REPOSITION NASAL TURBINATE, VIA NATURAL OR ARTIFICIAL OPENING ENDOSCOPIC: ICD-10-PCS | Performed by: OTOLARYNGOLOGY

## 2020-02-14 PROCEDURE — 8E09XBH COMPUTER ASSISTED PROCEDURE OF HEAD AND NECK REGION, WITH MAGNETIC RESONANCE IMAGING: ICD-10-PCS | Performed by: OTOLARYNGOLOGY

## 2020-02-14 DEVICE — PROPEL SINUS IMPLANT
Type: IMPLANTABLE DEVICE | Site: NOSE | Status: FUNCTIONAL
Brand: PROPEL

## 2020-02-14 RX ORDER — HYDROCODONE BITARTRATE AND ACETAMINOPHEN 5; 325 MG/1; MG/1
1 TABLET ORAL AS NEEDED
Status: DISCONTINUED | OUTPATIENT
Start: 2020-02-14 | End: 2020-02-14

## 2020-02-14 RX ORDER — DIAPER,BRIEF,INFANT-TODD,DISP
EACH MISCELLANEOUS AS NEEDED
Status: DISCONTINUED | OUTPATIENT
Start: 2020-02-14 | End: 2020-02-14 | Stop reason: HOSPADM

## 2020-02-14 RX ORDER — ACETAMINOPHEN 500 MG
1000 TABLET ORAL ONCE
Status: COMPLETED | OUTPATIENT
Start: 2020-02-14 | End: 2020-02-14

## 2020-02-14 RX ORDER — AMOXICILLIN 500 MG/1
500 CAPSULE ORAL 3 TIMES DAILY
Qty: 42 CAPSULE | Refills: 0 | Status: SHIPPED | OUTPATIENT
Start: 2020-02-14 | End: 2020-02-28

## 2020-02-14 RX ORDER — SODIUM CHLORIDE, SODIUM LACTATE, POTASSIUM CHLORIDE, CALCIUM CHLORIDE 600; 310; 30; 20 MG/100ML; MG/100ML; MG/100ML; MG/100ML
INJECTION, SOLUTION INTRAVENOUS CONTINUOUS
Status: DISCONTINUED | OUTPATIENT
Start: 2020-02-14 | End: 2020-02-14

## 2020-02-14 RX ORDER — PROCHLORPERAZINE EDISYLATE 5 MG/ML
5 INJECTION INTRAMUSCULAR; INTRAVENOUS ONCE AS NEEDED
Status: DISCONTINUED | OUTPATIENT
Start: 2020-02-14 | End: 2020-02-14

## 2020-02-14 RX ORDER — ONDANSETRON 2 MG/ML
INJECTION INTRAMUSCULAR; INTRAVENOUS AS NEEDED
Status: DISCONTINUED | OUTPATIENT
Start: 2020-02-14 | End: 2020-02-14 | Stop reason: SURG

## 2020-02-14 RX ORDER — MORPHINE SULFATE 10 MG/ML
6 INJECTION, SOLUTION INTRAMUSCULAR; INTRAVENOUS EVERY 10 MIN PRN
Status: DISCONTINUED | OUTPATIENT
Start: 2020-02-14 | End: 2020-02-14

## 2020-02-14 RX ORDER — HYDROMORPHONE HYDROCHLORIDE 1 MG/ML
0.4 INJECTION, SOLUTION INTRAMUSCULAR; INTRAVENOUS; SUBCUTANEOUS
Status: DISCONTINUED | OUTPATIENT
Start: 2020-02-14 | End: 2020-02-14

## 2020-02-14 RX ORDER — FAMOTIDINE 20 MG/1
20 TABLET ORAL ONCE
Status: COMPLETED | OUTPATIENT
Start: 2020-02-14 | End: 2020-02-14

## 2020-02-14 RX ORDER — DEXAMETHASONE SODIUM PHOSPHATE 4 MG/ML
VIAL (ML) INJECTION AS NEEDED
Status: DISCONTINUED | OUTPATIENT
Start: 2020-02-14 | End: 2020-02-14 | Stop reason: SURG

## 2020-02-14 RX ORDER — HYDROMORPHONE HYDROCHLORIDE 1 MG/ML
0.2 INJECTION, SOLUTION INTRAMUSCULAR; INTRAVENOUS; SUBCUTANEOUS EVERY 5 MIN PRN
Status: DISCONTINUED | OUTPATIENT
Start: 2020-02-14 | End: 2020-02-14

## 2020-02-14 RX ORDER — PREDNISONE 20 MG/1
TABLET ORAL
Qty: 21 TABLET | Refills: 0 | Status: SHIPPED | OUTPATIENT
Start: 2020-02-15 | End: 2020-02-14

## 2020-02-14 RX ORDER — ONDANSETRON 2 MG/ML
4 INJECTION INTRAMUSCULAR; INTRAVENOUS ONCE AS NEEDED
Status: DISCONTINUED | OUTPATIENT
Start: 2020-02-14 | End: 2020-02-14

## 2020-02-14 RX ORDER — NALOXONE HYDROCHLORIDE 0.4 MG/ML
80 INJECTION, SOLUTION INTRAMUSCULAR; INTRAVENOUS; SUBCUTANEOUS AS NEEDED
Status: DISCONTINUED | OUTPATIENT
Start: 2020-02-14 | End: 2020-02-14

## 2020-02-14 RX ORDER — PREDNISONE 20 MG/1
TABLET ORAL
Qty: 10 TABLET | Refills: 0 | Status: SHIPPED | OUTPATIENT
Start: 2020-02-15 | End: 2020-02-29 | Stop reason: ALTCHOICE

## 2020-02-14 RX ORDER — ROCURONIUM BROMIDE 10 MG/ML
INJECTION, SOLUTION INTRAVENOUS AS NEEDED
Status: DISCONTINUED | OUTPATIENT
Start: 2020-02-14 | End: 2020-02-14 | Stop reason: SURG

## 2020-02-14 RX ORDER — LABETALOL HYDROCHLORIDE 5 MG/ML
INJECTION, SOLUTION INTRAVENOUS AS NEEDED
Status: DISCONTINUED | OUTPATIENT
Start: 2020-02-14 | End: 2020-02-14 | Stop reason: SURG

## 2020-02-14 RX ORDER — MORPHINE SULFATE 4 MG/ML
2 INJECTION, SOLUTION INTRAMUSCULAR; INTRAVENOUS EVERY 10 MIN PRN
Status: DISCONTINUED | OUTPATIENT
Start: 2020-02-14 | End: 2020-02-14

## 2020-02-14 RX ORDER — HALOPERIDOL 5 MG/ML
0.25 INJECTION INTRAMUSCULAR ONCE AS NEEDED
Status: DISCONTINUED | OUTPATIENT
Start: 2020-02-14 | End: 2020-02-14

## 2020-02-14 RX ORDER — LIDOCAINE HYDROCHLORIDE AND EPINEPHRINE 10; 10 MG/ML; UG/ML
INJECTION, SOLUTION INFILTRATION; PERINEURAL AS NEEDED
Status: DISCONTINUED | OUTPATIENT
Start: 2020-02-14 | End: 2020-02-14 | Stop reason: HOSPADM

## 2020-02-14 RX ORDER — MORPHINE SULFATE 4 MG/ML
4 INJECTION, SOLUTION INTRAMUSCULAR; INTRAVENOUS EVERY 10 MIN PRN
Status: DISCONTINUED | OUTPATIENT
Start: 2020-02-14 | End: 2020-02-14

## 2020-02-14 RX ORDER — GLYCOPYRROLATE 0.2 MG/ML
INJECTION INTRAMUSCULAR; INTRAVENOUS AS NEEDED
Status: DISCONTINUED | OUTPATIENT
Start: 2020-02-14 | End: 2020-02-14 | Stop reason: SURG

## 2020-02-14 RX ORDER — HYDROMORPHONE HYDROCHLORIDE 1 MG/ML
0.6 INJECTION, SOLUTION INTRAMUSCULAR; INTRAVENOUS; SUBCUTANEOUS EVERY 5 MIN PRN
Status: DISCONTINUED | OUTPATIENT
Start: 2020-02-14 | End: 2020-02-14

## 2020-02-14 RX ORDER — LIDOCAINE HYDROCHLORIDE 10 MG/ML
INJECTION, SOLUTION EPIDURAL; INFILTRATION; INTRACAUDAL; PERINEURAL AS NEEDED
Status: DISCONTINUED | OUTPATIENT
Start: 2020-02-14 | End: 2020-02-14 | Stop reason: SURG

## 2020-02-14 RX ORDER — METOCLOPRAMIDE 10 MG/1
10 TABLET ORAL ONCE
Status: COMPLETED | OUTPATIENT
Start: 2020-02-14 | End: 2020-02-14

## 2020-02-14 RX ORDER — HYDROCODONE BITARTRATE AND ACETAMINOPHEN 7.5; 325 MG/1; MG/1
1 TABLET ORAL EVERY 6 HOURS PRN
Qty: 30 TABLET | Refills: 0 | Status: SHIPPED | OUTPATIENT
Start: 2020-02-14 | End: 2021-01-08 | Stop reason: ALTCHOICE

## 2020-02-14 RX ORDER — HYDROMORPHONE HYDROCHLORIDE 1 MG/ML
0.4 INJECTION, SOLUTION INTRAMUSCULAR; INTRAVENOUS; SUBCUTANEOUS EVERY 5 MIN PRN
Status: DISCONTINUED | OUTPATIENT
Start: 2020-02-14 | End: 2020-02-14

## 2020-02-14 RX ORDER — HYDROCODONE BITARTRATE AND ACETAMINOPHEN 5; 325 MG/1; MG/1
2 TABLET ORAL AS NEEDED
Status: DISCONTINUED | OUTPATIENT
Start: 2020-02-14 | End: 2020-02-14

## 2020-02-14 RX ADMIN — LIDOCAINE HYDROCHLORIDE 50 MG: 10 INJECTION, SOLUTION EPIDURAL; INFILTRATION; INTRACAUDAL; PERINEURAL at 09:57:00

## 2020-02-14 RX ADMIN — LABETALOL HYDROCHLORIDE 5 MG: 5 INJECTION, SOLUTION INTRAVENOUS at 10:10:00

## 2020-02-14 RX ADMIN — DEXAMETHASONE SODIUM PHOSPHATE 8 MG: 4 MG/ML VIAL (ML) INJECTION at 10:04:00

## 2020-02-14 RX ADMIN — SODIUM CHLORIDE, SODIUM LACTATE, POTASSIUM CHLORIDE, CALCIUM CHLORIDE: 600; 310; 30; 20 INJECTION, SOLUTION INTRAVENOUS at 10:39:00

## 2020-02-14 RX ADMIN — GLYCOPYRROLATE 0.2 MG: 0.2 INJECTION INTRAMUSCULAR; INTRAVENOUS at 09:50:00

## 2020-02-14 RX ADMIN — SODIUM CHLORIDE, SODIUM LACTATE, POTASSIUM CHLORIDE, CALCIUM CHLORIDE: 600; 310; 30; 20 INJECTION, SOLUTION INTRAVENOUS at 10:38:00

## 2020-02-14 RX ADMIN — ONDANSETRON 4 MG: 2 INJECTION INTRAMUSCULAR; INTRAVENOUS at 10:38:00

## 2020-02-14 RX ADMIN — ROCURONIUM BROMIDE 10 MG: 10 INJECTION, SOLUTION INTRAVENOUS at 09:57:00

## 2020-02-14 NOTE — PACU NOTE
D: Pt's dressing saturated through x2, I have redressed it x2.  A: Dr. Richi Lau notified in the OR>

## 2020-02-14 NOTE — OR PREOP
Discussed patient status with Dr. Joanne Gibbons. Dr. Joanne Gibbons stated to remove packing and place Afrin 2 sprays in each nostrils. Discussed plan of care with patient and significant other.

## 2020-02-14 NOTE — ANESTHESIA POSTPROCEDURE EVALUATION
Patient: Michael Osorio    Procedure Summary     Date:  02/14/20 Room / Location:  [facility] OR 02 / [facility] OR    Anesthesia Start:  8028 Anesthesia Stop:  8505    Procedure:  NASAL SEPTOPLASTY BILAT SINUS ENDOSCOPY ETHM MAX (Bilateral Nose) Diagnos

## 2020-02-14 NOTE — OPERATIVE REPORT
Lauren Villa  DATE OF SURGERY: 2/14/2020  PREOPERATIVE DIAGNOSIS:   Chronic nasal obstruction , chronic sinusitis, nasal polyps  POSTOPERATIVE DIAGNOSIS: Same.   OPERATIVE PROCEDURE:   Medtronic assisted navigational sinus surgery, Bilateral transn completion of this procedure the turbinate was noted to be quite shrunken and no bleeding was noted. Attention was then directed to the contralateral nasal cavity where a similar procedure was done with similar findings.   A left hemitransfixion incision w sphenoid. The frontal recess was then evaluated a [place] probe was used to localize the site of this.   This was verified with the [employer] system the up-biting forceps and microdebrider were used to remove cells from the frontal recess and then at the co

## 2020-02-14 NOTE — ANESTHESIA PROCEDURE NOTES
Airway  Urgency: Elective      General Information and Staff    Patient location during procedure: OR  Anesthesiologist: Abbey Kam MD  Resident/CRNA: Daniel Hayden CRNA  Performed: CRNA     Indications and Patient Condition  Indications for airw

## 2020-02-14 NOTE — INTERVAL H&P NOTE
Pre-op Diagnosis: Hypertrophy of nasal turbinates [J34.3]  Chronic ethmoidal sinusitis [J32.2]  Chronic maxillary sinusitis [J32.0]  Chronic frontal sinusitis [J32.1]  Nose polyp [J33.9]    The above referenced H&P was reviewed by Marcel Guzman MD on 2/14/

## 2020-02-14 NOTE — ANESTHESIA PREPROCEDURE EVALUATION
Anesthesia PreOp Note    HPI:     Media Chris is a 46year old male who presents for preoperative consultation requested by: Percy Mary MD    Date of Surgery: 2/14/2020    Procedure(s):  NASAL SEPTOPLASTY BILAT SINUS ENDOSCOPY ETHM MAX  Indica • Asthma     Samples tried   • Chronic sinusitis    • Esophageal reflux    • High blood pressure    • Hyperlipidemia    • Hypertension    • Nasal polyps    • Problems with swallowing    • Sleep apnea    • Visual impairment     GLASSES       Past Surgical tablet (40 mg total) by mouth 2 (two) times daily.  (Patient taking differently: Take 40 mg by mouth daily as needed.  ), Disp: 180 tablet, Rfl: 3, More than a month at Unknown time      lactated ringers infusion, , Intravenous, Continuous, AMBER N[place] Never      Drug use: No      Sexual activity: Not on file    Lifestyle      Physical activity:        Days per week: Not on file        Minutes per session: Not on file      Stress: Not on file    Relationships      Social connections:        Talks on phon his pulse is 84. His respiration is 18 and oxygen saturation is 99%. 02/06/20  1716 02/14/20  0732   BP:  151/86   Pulse:  84   Resp:  18   Temp:  98.8 °F (37.1 °C)   TempSrc:  Oral   SpO2:  99%   Weight: 120.2 kg (265 lb) 120.7 kg (266 lb)   Height: 1.

## 2020-02-15 ENCOUNTER — TELEPHONE (OUTPATIENT)
Dept: OTOLARYNGOLOGY | Facility: CLINIC | Age: 40
End: 2020-02-15

## 2020-02-15 NOTE — TELEPHONE ENCOUNTER
Pt is post op day 1 endo maxillary with tissue removal, endo total ethmoidectomy, endo polypectomy , SMR. Per  Pt pt is doing well no bleeding, advised pt no bending or heavy lifting for the next week and pt is not to blow nose until seen by Dr Cass Ramirez.  Latosha Lino

## 2020-02-21 ENCOUNTER — OFFICE VISIT (OUTPATIENT)
Dept: OTOLARYNGOLOGY | Facility: CLINIC | Age: 40
End: 2020-02-21
Payer: COMMERCIAL

## 2020-02-21 VITALS
DIASTOLIC BLOOD PRESSURE: 84 MMHG | WEIGHT: 266 LBS | HEIGHT: 69 IN | SYSTOLIC BLOOD PRESSURE: 137 MMHG | TEMPERATURE: 98 F | BODY MASS INDEX: 39.4 KG/M2

## 2020-02-21 DIAGNOSIS — J33.9 NASAL POLYPS: Primary | ICD-10-CM

## 2020-02-21 DIAGNOSIS — J32.4 CHRONIC PANSINUSITIS: ICD-10-CM

## 2020-02-21 PROCEDURE — 31237 NSL/SINS NDSC SURG BX POLYPC: CPT | Performed by: OTOLARYNGOLOGY

## 2020-02-21 PROCEDURE — 99024 POSTOP FOLLOW-UP VISIT: CPT | Performed by: OTOLARYNGOLOGY

## 2020-02-21 NOTE — PROGRESS NOTES
Yemi Coelho is a 46year old male.   Patient presents with:  Post-Op: medtronic, endo maxillary w/tissue removal, endo total ethmoidectomy, edno shenoid, endo frontal, endo nasal polyorctomy, smd done on 2/14/20      HISTORY OF PRESENT ILLNESS  12/ Problem Relation Age of Onset   • Asthma Mother    • Prostate Cancer Father    • Hypertension Father      Past Medical History:   Diagnosis Date   • Aspirin sensitivity     ASA Sensitivity   • Asthma     Samples tried   • Chronic sinusitis    • Esophagea time, place, person & situation. Appropriate mood and affect. Neck Exam Normal Inspection - Normal. Palpation - Normal without lymphadenopathy.  Parotid gland - Normal. Thyroid gland - Normal.   Eyes Normal Conjunctiva - Right: Normal, Left: Normal. Pupil 500 MG Oral Cap, Take 1 capsule (500 mg total) by mouth 3 (three) times daily for 14 days. , Disp: 42 capsule, Rfl: 0  •  predniSONE 20 MG Oral Tab, Take 2 tabs by oral route for3 days, then 1 tab po for 2  days then 1/2 tab po for 4 days, Disp: 10 tablet, really has a lot of mucosa that is denuded and I do not think this would benefit him much.               Xavi Ennis MD

## 2020-02-29 ENCOUNTER — OFFICE VISIT (OUTPATIENT)
Dept: OTOLARYNGOLOGY | Facility: CLINIC | Age: 40
End: 2020-02-29
Payer: COMMERCIAL

## 2020-02-29 VITALS
TEMPERATURE: 99 F | BODY MASS INDEX: 39.4 KG/M2 | DIASTOLIC BLOOD PRESSURE: 88 MMHG | SYSTOLIC BLOOD PRESSURE: 150 MMHG | HEART RATE: 90 BPM | WEIGHT: 266 LBS | HEIGHT: 69 IN | RESPIRATION RATE: 18 BRPM

## 2020-02-29 DIAGNOSIS — J33.9 NASAL POLYPS: Primary | ICD-10-CM

## 2020-02-29 PROCEDURE — 99024 POSTOP FOLLOW-UP VISIT: CPT | Performed by: OTOLARYNGOLOGY

## 2020-02-29 PROCEDURE — 31237 NSL/SINS NDSC SURG BX POLYPC: CPT | Performed by: OTOLARYNGOLOGY

## 2020-02-29 RX ORDER — FLUTICASONE PROPIONATE 50 MCG
2 SPRAY, SUSPENSION (ML) NASAL DAILY
Qty: 3 BOTTLE | Refills: 4 | Status: SHIPPED | OUTPATIENT
Start: 2020-02-29 | End: 2020-09-28

## 2020-02-29 NOTE — PROGRESS NOTES
Mayco Fortune is a 46year old male.   Patient presents with:  Post-Op: Nasal polyps 2nd visit      HISTORY OF PRESENT ILLNESS  12/14/2019  Patient is very miserable he never breathes through his nose he feels like is worse the only thing this helped Hypertension Father      Past Medical History:   Diagnosis Date   • Aspirin sensitivity     ASA Sensitivity   • Asthma     Samples tried   • Chronic sinusitis    • Esophageal reflux    • High blood pressure    • Hyperlipidemia    • Hypertension    • Nasal lymphadenopathy.  Parotid gland - Normal. Thyroid gland - Normal.   Eyes Normal Conjunctiva - Right: Normal, Left: Normal. Pupil - Right: Normal, Left: Normal. EOMI   Neurological Normal Memory - Normal. Cranial nerves - Cranial nerves II through XII grossl , Disp: , Rfl:   •  Rosuvastatin Calcium 5 MG Oral Tab, Take 1 tablet (5 mg total) by mouth nightly., Disp: 90 tablet, Rfl: 1  •  Levocetirizine Dihydrochloride (XYZAL) 5 MG Oral Tab, Take 1 tablet (5 mg total) by mouth every evening., Disp: 90 tablet, Rf

## 2020-03-02 DIAGNOSIS — R09.81 NASAL CONGESTION: ICD-10-CM

## 2020-03-02 DIAGNOSIS — J34.3 HYPERTROPHY OF NASAL TURBINATES: ICD-10-CM

## 2020-03-03 RX ORDER — LEVOCETIRIZINE DIHYDROCHLORIDE 5 MG/1
5 TABLET, FILM COATED ORAL EVERY EVENING
Qty: 90 TABLET | Refills: 1 | OUTPATIENT
Start: 2020-03-03

## 2020-03-17 ENCOUNTER — NURSE TRIAGE (OUTPATIENT)
Dept: FAMILY MEDICINE CLINIC | Facility: CLINIC | Age: 40
End: 2020-03-17

## 2020-03-17 RX ORDER — AMOXICILLIN AND CLAVULANATE POTASSIUM 875; 125 MG/1; MG/1
1 TABLET, FILM COATED ORAL 2 TIMES DAILY
Qty: 20 TABLET | Refills: 0 | Status: SHIPPED | OUTPATIENT
Start: 2020-03-17 | End: 2020-03-27

## 2020-03-17 NOTE — TELEPHONE ENCOUNTER
Action Requested: Summary for Provider     []  Critical Lab, Recommendations Needed  [x] Need Additional Advice  []   FYI    []   Need Orders  [x] Need Medications Sent to Pharmacy  []  Other     SUMMARY: Dr Chan Caruso for Dr Mike Dubois, please advise.

## 2020-03-17 NOTE — TELEPHONE ENCOUNTER
Complaining of 4 days of coughing up yellow phlegm some nasal congestion temperature 99.7 today works at a . Some facial pressure has dysphonia and dysphagia. Sinus surgery was done February 14.     Reports that asthmatic symptoms relieved by inh

## 2020-03-18 RX ORDER — ALBUTEROL SULFATE 90 UG/1
2 AEROSOL, METERED RESPIRATORY (INHALATION) EVERY 4 HOURS PRN
Qty: 8.5 G | Refills: 4 | Status: SHIPPED | OUTPATIENT
Start: 2020-03-18 | End: 2020-06-28

## 2020-03-20 ENCOUNTER — TELEPHONE (OUTPATIENT)
Dept: FAMILY MEDICINE CLINIC | Facility: CLINIC | Age: 40
End: 2020-03-20

## 2020-03-20 NOTE — TELEPHONE ENCOUNTER
ENT Clinical Staff--Dr Zander Sabillon had forwarded a Triage Encounter to Dr Stu Johnson, please assist on this. Thank you.     Suzie Dee, DO   to Omid Lemus MD          3/17/20 6:14 PM   Note      Complaining of 4 days of coughing up yellow phlegm some na

## 2020-03-20 NOTE — TELEPHONE ENCOUNTER
Created TE condition update/referral, sent to ENT Clinical Staff for follow-up with doctor's note on it (see below).

## 2020-03-23 NOTE — TELEPHONE ENCOUNTER
Agree with advice given recommend he start the antibiotics as prescribed by dr Jesica Silvestre consider adding steroids if not improved over the next 72 hours

## 2020-03-23 NOTE — TELEPHONE ENCOUNTER
FYI, Dr. José Manuel Tarango pt states he is feeling much better and advised to continue antibiotics as prescribed.

## 2020-04-03 ENCOUNTER — TELEPHONE (OUTPATIENT)
Dept: OTOLARYNGOLOGY | Facility: CLINIC | Age: 40
End: 2020-04-03

## 2020-04-03 NOTE — TELEPHONE ENCOUNTER
Pt states he has yellow discharge coming nose on right side and a lot of pressure in his face was on a antibiotic from Dr. Krystle Mendeita and it didn't work and never works when given to him please advise

## 2020-04-06 NOTE — TELEPHONE ENCOUNTER
Per pt having yellow drainage from nose, per pt feels a lot of facial pain in face and forehead,  pt just finished on  antibiotic Augmentin on 03/27 prescribed by PCP.  Please advise

## 2020-04-07 RX ORDER — PREDNISONE 20 MG/1
TABLET ORAL
Qty: 15 TABLET | Refills: 0 | Status: SHIPPED | OUTPATIENT
Start: 2020-04-07 | End: 2020-09-01 | Stop reason: ALTCHOICE

## 2020-04-07 RX ORDER — SULFAMETHOXAZOLE AND TRIMETHOPRIM 800; 160 MG/1; MG/1
1 TABLET ORAL EVERY 12 HOURS
Qty: 28 TABLET | Refills: 0 | Status: SHIPPED | OUTPATIENT
Start: 2020-04-07 | End: 2020-04-21

## 2020-04-07 RX ORDER — AMOXICILLIN AND CLAVULANATE POTASSIUM 875; 125 MG/1; MG/1
1 TABLET, FILM COATED ORAL 2 TIMES DAILY
Qty: 28 TABLET | Refills: 0 | Status: SHIPPED | OUTPATIENT
Start: 2020-04-07 | End: 2020-04-21

## 2020-04-07 RX ORDER — PREDNISONE 20 MG/1
TABLET ORAL
Qty: 15 TABLET | Refills: 0 | Status: SHIPPED | OUTPATIENT
Start: 2020-04-07 | End: 2020-04-07

## 2020-04-07 NOTE — TELEPHONE ENCOUNTER
Pt would like antibiotic changed as per pt Augmentin did not work. Per  send in Bactrim DS BID for 14 days, rx sent pt informed of medications.

## 2020-04-07 NOTE — TELEPHONE ENCOUNTER
per pt states due to COVID 19 pt would like to do E visit instead of coming in to office, pt states he knows it is a sinus infection.  Please advise

## 2020-04-11 ENCOUNTER — PATIENT MESSAGE (OUTPATIENT)
Dept: OTOLARYNGOLOGY | Facility: CLINIC | Age: 40
End: 2020-04-11

## 2020-04-12 ENCOUNTER — PATIENT MESSAGE (OUTPATIENT)
Dept: FAMILY MEDICINE CLINIC | Facility: CLINIC | Age: 40
End: 2020-04-12

## 2020-04-13 ENCOUNTER — TELEPHONE (OUTPATIENT)
Dept: FAMILY MEDICINE CLINIC | Facility: CLINIC | Age: 40
End: 2020-04-13

## 2020-04-13 NOTE — TELEPHONE ENCOUNTER
Patient states insurance is requesting a Prior Authorization for medication below.     Esomeprazole Magnesium 20 MG Oral Capsule Delayed Release 90 capsule 1 4/13/2020    Sig:   Take 1 capsule (20 mg total) by mouth every morning before breakfast.     Route

## 2020-04-13 NOTE — TELEPHONE ENCOUNTER
Prior authorization for Esomeprazole completed w/  on cover my meds Key: [id number], turn around time 3-5 days.

## 2020-04-13 NOTE — TELEPHONE ENCOUNTER
From: Gage Woo  To: Wes Rivers MD  Sent: 4/11/2020 8:24 PM CDT  Subject: Visit Follow-up Question    The other night I woke up around 5:30 in the morning gasping for air to breath , this has happened in the past when I have a lot of nasal dr

## 2020-04-13 NOTE — TELEPHONE ENCOUNTER
Jaison Cloud MD  Em Ent Clinical Staff 1 hour ago (10:58 AM)      There can be multiple issues that cause gasping while sleeping: sleep apnea, nasal congestion, acid reflux( heartburn) and sedation from medications and alcohol

## 2020-04-13 NOTE — TELEPHONE ENCOUNTER
From: Miller Zeng  To: Jc Chan DO  Sent: 4/12/2020 10:37 PM CDT  Subject: Prescription Question    I need a refill on generic nexium I take 1 capsule a day , my insurance covers this medication. I take it for my acid reflux problems.

## 2020-04-14 ENCOUNTER — NURSE TRIAGE (OUTPATIENT)
Dept: FAMILY MEDICINE CLINIC | Facility: CLINIC | Age: 40
End: 2020-04-14

## 2020-04-14 DIAGNOSIS — J45.40 MODERATE PERSISTENT ASTHMA WITHOUT COMPLICATION: ICD-10-CM

## 2020-04-14 DIAGNOSIS — J32.9 OTHER SINUSITIS, UNSPECIFIED CHRONICITY: ICD-10-CM

## 2020-04-14 PROCEDURE — 99213 OFFICE O/P EST LOW 20 MIN: CPT | Performed by: FAMILY MEDICINE

## 2020-04-14 NOTE — TELEPHONE ENCOUNTER
Action Requested: Summary for Provider     []  Critical Lab, Recommendations Needed  [] Need Additional Advice  []   FYI    []   Need Orders  [] Need Medications Sent to Pharmacy  []  Other     SUMMARY: Patient reports having post nasal drip and intermitte

## 2020-04-14 NOTE — TELEPHONE ENCOUNTER
Insurance response states medication is covered by patient's benefit plan. No prior authorization is needed.

## 2020-04-14 NOTE — TELEPHONE ENCOUNTER
, per pt is still having a lot of PND, states he feels like it is going to his lungs/chest,no fever no other symptoms, states it is sinus. Pt still on bactrim DS  Pt  states he finds himself using his rescue inhaler more.  Pt is not SOB, talking in

## 2020-04-14 NOTE — TELEPHONE ENCOUNTER
Virtual Telephone Check-In    Ekta Tian verbally consents to a Virtual/Telephone Check-In visit on 04/14/20.     Patient understands and accepts financial responsibility for any deductible, co-insurance and/or co-pays associated with this service

## 2020-04-15 NOTE — TELEPHONE ENCOUNTER
Magalis Xie MD  Em Ent Clinical Staff 13 hours ago (7:21 PM)      If he needs his rescue inhaler ore he needs to talk to whoever is managing his asthma

## 2020-05-04 RX ORDER — FLUTICASONE PROPIONATE AND SALMETEROL 250; 50 UG/1; UG/1
1 POWDER RESPIRATORY (INHALATION) EVERY 12 HOURS SCHEDULED
Qty: 1 EACH | Refills: 0 | Status: SHIPPED | OUTPATIENT
Start: 2020-05-04 | End: 2020-09-01 | Stop reason: ALTCHOICE

## 2020-05-10 ENCOUNTER — TELEPHONE (OUTPATIENT)
Dept: FAMILY MEDICINE CLINIC | Facility: CLINIC | Age: 40
End: 2020-05-10

## 2020-05-10 NOTE — TELEPHONE ENCOUNTER
Please try to follow up with patient, he was advised to go to ER but did not go and does not want to go based on his response. Thank you.     Sharyle Mealing,    Thank you for reaching out to our office regarding your symptoms.  Our office is currently close

## 2020-05-27 NOTE — TELEPHONE ENCOUNTER
This is being sent to you without review by the Triage staff due to the high call volumes created by the COVID-19 virus, per the email sent by Dr. Vanita Rojo on 3/19/20. Thank you for your support.     Centralized Nurse Triage Team

## 2020-05-28 RX ORDER — ERGOCALCIFEROL 1.25 MG/1
50000 CAPSULE ORAL WEEKLY
Qty: 12 CAPSULE | Refills: 0 | Status: SHIPPED | OUTPATIENT
Start: 2020-05-28 | End: 2020-09-01 | Stop reason: ALTCHOICE

## 2020-06-05 NOTE — TELEPHONE ENCOUNTER
Multiple attempts to reach patient, including by provider without response. Patient did read message as noted below. Please advise if any additional action is needed. Thank you.     RAJWINDER Hernandez   to Juan Sr 5

## 2020-06-15 ENCOUNTER — TELEPHONE (OUTPATIENT)
Dept: FAMILY MEDICINE CLINIC | Facility: CLINIC | Age: 40
End: 2020-06-15

## 2020-06-15 DIAGNOSIS — R09.81 NASAL CONGESTION: ICD-10-CM

## 2020-06-15 DIAGNOSIS — J34.3 HYPERTROPHY OF NASAL TURBINATES: ICD-10-CM

## 2020-06-15 NOTE — TELEPHONE ENCOUNTER
Pharmacist states pt is asking for Montelukast prescription, this is not a refill. Advised pharmacist medication request needs to be reviewed with pt.      Left message to call back, transfer to triage

## 2020-06-15 NOTE — TELEPHONE ENCOUNTER
Per pharmacy patient needs refill on his Levocetirizine Dihydrochloride (XYZAL)     Current Outpatient Medications   Medication Sig Dispense Refill   •       •       •       •       •       •       •       •       •       • Levocetirizine Dihydrochloride (

## 2020-06-16 RX ORDER — MONTELUKAST SODIUM 10 MG/1
10 TABLET ORAL NIGHTLY
Qty: 90 TABLET | Refills: 1 | Status: SHIPPED | OUTPATIENT
Start: 2020-06-16 | End: 2020-09-01 | Stop reason: ALTCHOICE

## 2020-06-16 RX ORDER — LEVOCETIRIZINE DIHYDROCHLORIDE 5 MG/1
5 TABLET, FILM COATED ORAL EVERY EVENING
Qty: 90 TABLET | Refills: 1 | Status: SHIPPED | OUTPATIENT
Start: 2020-06-16 | End: 2020-11-19

## 2020-06-21 ENCOUNTER — PATIENT MESSAGE (OUTPATIENT)
Dept: FAMILY MEDICINE CLINIC | Facility: CLINIC | Age: 40
End: 2020-06-21

## 2020-06-22 NOTE — TELEPHONE ENCOUNTER
From: Melisa Mistry  To: Abbi Chan DO  Sent: 6/21/2020 10:18 PM CDT  Subject: Prescription Question    I need a refill for levocetirizine 5mg   Please send medicine to the [facility] on [address] in [address] [facility] thank you

## 2020-06-23 ENCOUNTER — PATIENT MESSAGE (OUTPATIENT)
Dept: FAMILY MEDICINE CLINIC | Facility: CLINIC | Age: 40
End: 2020-06-23

## 2020-06-23 NOTE — TELEPHONE ENCOUNTER
From: Trudy Murray  To: Yony Chan DO  Sent: 6/23/2020 9:17 AM CDT  Subject: Prescription Question    I need a refill on my proair inhaler

## 2020-06-23 NOTE — TELEPHONE ENCOUNTER
Spoke with pt,  verified. Pt was informed of below message. Pt stated he gets his Proair from [employer] but he will call [facility] to get the remaining refill transfer [facility].

## 2020-06-23 NOTE — TELEPHONE ENCOUNTER
Spoke with Chava Marquez from [facility] and he stated there still 5 more refill `left on proair. Lizzie Mesa will inform pt.

## 2020-06-28 ENCOUNTER — TELEPHONE (OUTPATIENT)
Dept: FAMILY MEDICINE CLINIC | Facility: CLINIC | Age: 40
End: 2020-06-28

## 2020-06-30 NOTE — TELEPHONE ENCOUNTER
Open encounter chart audit being conducted. Seems patient read [employer] message and did not contact the dept. No appointments notes after the communication attempts. Will consider this resolved since pt has not re-contacted the office.  Closing encou

## 2020-07-29 ENCOUNTER — PATIENT MESSAGE (OUTPATIENT)
Dept: FAMILY MEDICINE CLINIC | Facility: CLINIC | Age: 40
End: 2020-07-29

## 2020-07-29 DIAGNOSIS — R21 RASH OF HANDS: Primary | ICD-10-CM

## 2020-07-30 NOTE — TELEPHONE ENCOUNTER
From: Rigo Olivo  To: Karson Chan DO  Sent: 7/29/2020 8:14 PM CDT  Subject: Referral Request    My skin on my fingers are very itchy and red and it hurts and it’s dry , they told me before it was eczema and I tryed creams and was given med

## 2020-08-05 ENCOUNTER — TELEPHONE (OUTPATIENT)
Dept: FAMILY MEDICINE CLINIC | Facility: CLINIC | Age: 40
End: 2020-08-05

## 2020-08-05 NOTE — TELEPHONE ENCOUNTER
----- Message from Anneliese Villa sent at 8/4/2020  7:29 PM CDT -----  Regarding: Other  Contact: [phone]  My sciatica is really bothering me on my right side , the only thing that’s been helping me is taking a norco pain medicine I had left fr

## 2020-08-07 NOTE — TELEPHONE ENCOUNTER
Left Message To Call Back    Several attempts to reach pt w/out success. No response letter sent to pt via my chart.     Ginny Garcia

## 2020-08-27 NOTE — TELEPHONE ENCOUNTER
Action Requested: Summary for Provider     []  Critical Lab, Recommendations Needed  [] Need Additional Advice  []   FYI    []   Need Orders  [] Need Medications Sent to Pharmacy  []  Other     SUMMARY: Dr Skip Anton, patient scheduled for 12/11/18 Marcellina Conception no established current out-Pt psychiatrist

## 2020-09-01 ENCOUNTER — OFFICE VISIT (OUTPATIENT)
Dept: FAMILY MEDICINE CLINIC | Facility: CLINIC | Age: 40
End: 2020-09-01
Payer: COMMERCIAL

## 2020-09-01 ENCOUNTER — HOSPITAL ENCOUNTER (OUTPATIENT)
Dept: GENERAL RADIOLOGY | Age: 40
Discharge: HOME | End: 2020-09-01
Attending: FAMILY MEDICINE
Payer: COMMERCIAL

## 2020-09-01 VITALS
HEIGHT: 69 IN | SYSTOLIC BLOOD PRESSURE: 135 MMHG | WEIGHT: 249 LBS | DIASTOLIC BLOOD PRESSURE: 80 MMHG | HEART RATE: 77 BPM | BODY MASS INDEX: 36.88 KG/M2

## 2020-09-01 DIAGNOSIS — M54.16 LUMBAR RADICULOPATHY: ICD-10-CM

## 2020-09-01 DIAGNOSIS — M54.16 LUMBAR RADICULOPATHY: Primary | ICD-10-CM

## 2020-09-01 PROCEDURE — 3075F SYST BP GE 130 - 139MM HG: CPT | Performed by: FAMILY MEDICINE

## 2020-09-01 PROCEDURE — 3008F BODY MASS INDEX DOCD: CPT | Performed by: FAMILY MEDICINE

## 2020-09-01 PROCEDURE — 3079F DIAST BP 80-89 MM HG: CPT | Performed by: FAMILY MEDICINE

## 2020-09-01 PROCEDURE — 72110 X-RAY EXAM L-2 SPINE 4/>VWS: CPT | Performed by: FAMILY MEDICINE

## 2020-09-01 PROCEDURE — 99214 OFFICE O/P EST MOD 30 MIN: CPT | Performed by: FAMILY MEDICINE

## 2020-09-01 RX ORDER — METHYLPREDNISOLONE 4 MG/1
TABLET ORAL
Qty: 1 KIT | Refills: 1 | Status: SHIPPED | OUTPATIENT
Start: 2020-09-01 | End: 2021-01-06

## 2020-09-01 RX ORDER — MOMETASONE FUROATE 1 MG/G
1 CREAM TOPICAL 2 TIMES DAILY PRN
Qty: 50 G | Refills: 0 | Status: SHIPPED | OUTPATIENT
Start: 2020-09-01 | End: 2022-02-01 | Stop reason: ALTCHOICE

## 2020-09-01 RX ORDER — FLUTICASONE PROPIONATE 50 MCG
SPRAY, SUSPENSION (ML) NASAL
COMMUNITY
Start: 2020-08-03 | End: 2020-12-04

## 2020-09-01 NOTE — PROGRESS NOTES
Blood pressure 135/80, pulse 77, height 5' 9\" (1.753 m), weight 249 lb (112.9 kg). Patient presents today complaining of a rash on the dorsum of his hand he works in a  and is frequently washing his hands.   He reports the Lotrisone prescription

## 2020-09-10 RX ORDER — HYDROCODONE BITARTRATE AND ACETAMINOPHEN 7.5; 325 MG/1; MG/1
1 TABLET ORAL EVERY 6 HOURS PRN
Qty: 30 TABLET | Refills: 0 | OUTPATIENT
Start: 2020-09-10

## 2020-09-27 ENCOUNTER — PATIENT MESSAGE (OUTPATIENT)
Dept: FAMILY MEDICINE CLINIC | Facility: CLINIC | Age: 40
End: 2020-09-27

## 2020-09-27 DIAGNOSIS — I10 ESSENTIAL HYPERTENSION: ICD-10-CM

## 2020-09-28 RX ORDER — MONTELUKAST SODIUM 10 MG/1
10 TABLET ORAL NIGHTLY
Qty: 90 TABLET | Refills: 1 | Status: SHIPPED | OUTPATIENT
Start: 2020-09-28 | End: 2020-11-19

## 2020-09-28 RX ORDER — ROSUVASTATIN CALCIUM 5 MG/1
5 TABLET, COATED ORAL NIGHTLY
Qty: 90 TABLET | Refills: 0 | Status: SHIPPED | OUTPATIENT
Start: 2020-09-28 | End: 2020-11-04

## 2020-09-28 RX ORDER — FLUTICASONE PROPIONATE 50 MCG
2 SPRAY, SUSPENSION (ML) NASAL DAILY
Qty: 3 BOTTLE | Refills: 1 | Status: SHIPPED | OUTPATIENT
Start: 2020-09-28 | End: 2020-10-28

## 2020-09-28 RX ORDER — LOSARTAN POTASSIUM 50 MG/1
50 TABLET ORAL
Qty: 90 TABLET | Refills: 1 | Status: SHIPPED | OUTPATIENT
Start: 2020-09-28 | End: 2020-11-19

## 2020-09-28 NOTE — TELEPHONE ENCOUNTER
From: Miller Zeng  To: Jc Chan DO  Sent: 9/27/2020 11:00 PM CDT  Subject: Prescription Question    I need refills for fluticasone nasal spray,  Montelukast , rosuvastatin,losartan,

## 2020-10-12 ENCOUNTER — TELEPHONE (OUTPATIENT)
Dept: FAMILY MEDICINE CLINIC | Facility: CLINIC | Age: 40
End: 2020-10-12

## 2020-10-13 NOTE — TELEPHONE ENCOUNTER
Chart reviewed, seen for lumbar pain 9/1, xrays of back showed degenerative changes. Knee pain not evaluated. Patient will need appointment. Left message to call back.

## 2020-10-13 NOTE — TELEPHONE ENCOUNTER
From: Gorge Justin  To: Xochilt Chan DO  Sent: 10/12/2020  6:42 PM CDT  Subject: Other    I am having lots of problems with my knees , lots of pain and hard to get up from the floor, I had problems before and had a X-ray before and I don’t re

## 2020-10-27 ENCOUNTER — IMMUNIZATION (OUTPATIENT)
Dept: FAMILY MEDICINE CLINIC | Facility: CLINIC | Age: 40
End: 2020-10-27
Payer: COMMERCIAL

## 2020-10-27 DIAGNOSIS — Z23 NEED FOR VACCINATION: ICD-10-CM

## 2020-10-27 PROCEDURE — 90686 IIV4 VACC NO PRSV 0.5 ML IM: CPT | Performed by: FAMILY MEDICINE

## 2020-10-27 PROCEDURE — 90471 IMMUNIZATION ADMIN: CPT | Performed by: FAMILY MEDICINE

## 2020-11-03 ENCOUNTER — TELEPHONE (OUTPATIENT)
Dept: FAMILY MEDICINE CLINIC | Facility: CLINIC | Age: 40
End: 2020-11-03

## 2020-11-04 ENCOUNTER — HOSPITAL ENCOUNTER (OUTPATIENT)
Age: 40
Discharge: HOME | End: 2020-11-04
Attending: EMERGENCY MEDICINE
Payer: COMMERCIAL

## 2020-11-04 VITALS
DIASTOLIC BLOOD PRESSURE: 85 MMHG | OXYGEN SATURATION: 100 % | SYSTOLIC BLOOD PRESSURE: 162 MMHG | TEMPERATURE: 99 F | RESPIRATION RATE: 20 BRPM | HEART RATE: 100 BPM

## 2020-11-04 DIAGNOSIS — J01.41 ACUTE RECURRENT PANSINUSITIS: Primary | ICD-10-CM

## 2020-11-04 DIAGNOSIS — E78.2 MIXED HYPERLIPIDEMIA: ICD-10-CM

## 2020-11-04 DIAGNOSIS — J30.89 SEASONAL ALLERGIC RHINITIS DUE TO OTHER ALLERGIC TRIGGER: Primary | ICD-10-CM

## 2020-11-04 PROCEDURE — 99213 OFFICE O/P EST LOW 20 MIN: CPT

## 2020-11-04 PROCEDURE — 99214 OFFICE O/P EST MOD 30 MIN: CPT

## 2020-11-04 RX ORDER — ROSUVASTATIN CALCIUM 5 MG/1
5 TABLET, COATED ORAL NIGHTLY
Qty: 90 TABLET | Refills: 0 | Status: SHIPPED | OUTPATIENT
Start: 2020-11-04 | End: 2020-11-19

## 2020-11-04 RX ORDER — CEFDINIR 300 MG/1
300 CAPSULE ORAL 2 TIMES DAILY
Qty: 20 CAPSULE | Refills: 0 | Status: SHIPPED | OUTPATIENT
Start: 2020-11-04 | End: 2020-11-14

## 2020-11-04 NOTE — TELEPHONE ENCOUNTER
Message # [phone]         2020 09:10p   [KIRTP]  To:  From:  KIRAN Paz MD:  Phone#:  ----------------------------------------------------------------------  PT Valente Amada [phone]  [id number] RE: PATIENT CONDITION AND FEVER Paged at n

## 2020-11-04 NOTE — ED PROVIDER NOTES
Patient Seen in: [facility]      History   Patient presents with:  Sinus Problem    Stated Complaint: sinus pressure     HPI    Patient is a 41-year-old male with past history of hypertension, chronic sinusitis, status post endoscopic sinus s Smoking status: Never Smoker      Smokeless tobacco: Never Used      Tobacco comment: Girlfriend smokes outside    Alcohol use: Never      Alcohol/week: 0.0 standard drinks      Frequency: Never    Drug use:  No             Review of Systems    Positive for MD  [address] [facility]  [phone]    In 1 week  If symptoms worsen          Medications Prescribed:  Discharge Medication List as of 11/4/2020  8:17 AM    START taking these medications    cefdinir 300 MG Oral Cap  Take 1 caps

## 2020-11-06 NOTE — TELEPHONE ENCOUNTER
Patient calls with a complaint of wheezing and cough. Known history of asthma controlled on medications. Mild elevation in temperature over 100 degrees. No known exposure to COVID-19.   I recommend that the continue with his asthma medications if he is s

## 2020-11-10 ENCOUNTER — TELEMEDICINE (OUTPATIENT)
Dept: FAMILY MEDICINE CLINIC | Facility: CLINIC | Age: 40
End: 2020-11-10

## 2020-11-10 ENCOUNTER — TELEPHONE (OUTPATIENT)
Dept: FAMILY MEDICINE CLINIC | Facility: CLINIC | Age: 40
End: 2020-11-10

## 2020-11-10 DIAGNOSIS — U07.1 COVID-19: Primary | ICD-10-CM

## 2020-11-10 PROCEDURE — 99213 OFFICE O/P EST LOW 20 MIN: CPT | Performed by: FAMILY MEDICINE

## 2020-11-10 NOTE — PROGRESS NOTES
VIDEO VISIT      Patient reports that he is following up for Covid has not had a temperature over 100 degrees for a couple of days. Denies dyspnea. Is feeling better. He has been requested to return to work in 3 days.     Objective patient is comfortable

## 2020-11-10 NOTE — TELEPHONE ENCOUNTER
Patient reports that he has quarantined  for 10 days since his positive covid test.      Symptoms started Tuesday, 11-3-2020 and have waxed and waned:  Temperature of 99.0  bodystill achy  \"my lungs feel swollen\"  States he has post nasal drip  Cough is

## 2020-11-13 NOTE — TELEPHONE ENCOUNTER
Please keep open for RN follow up. Dr Ranelle Lanes, please advise. Televisit 11/10/2020. Covid + 11/4/2020. Patient reported still coughing, runny nose, and feels \"bruised lungs,\" like he has broken ribs. He has a little phlegm.  He said originally felt

## 2020-11-16 NOTE — TELEPHONE ENCOUNTER
Dr Chan=patient requesting new letter with return date to work on 11/18/20.       What  was your temp today? n/a    How did you take your temp?     n/a    Are you feeling short of breath today?  n/a    Is the shortness of breath better, the same, or wor

## 2020-11-17 ENCOUNTER — PATIENT MESSAGE (OUTPATIENT)
Dept: FAMILY MEDICINE CLINIC | Facility: CLINIC | Age: 40
End: 2020-11-17

## 2020-11-17 NOTE — TELEPHONE ENCOUNTER
Duplicate request  I copied to other encounter. From: Yamila Adorno  To: Dominga Chan DO  Sent: 11/17/2020 11:29 AM CST  Subject: Visit Follow-up Question    Yes I called Friday telling them I still didn't feel good so I didn't go back to w

## 2020-11-19 DIAGNOSIS — R09.81 NASAL CONGESTION: ICD-10-CM

## 2020-11-19 DIAGNOSIS — J34.3 HYPERTROPHY OF NASAL TURBINATES: ICD-10-CM

## 2020-11-19 DIAGNOSIS — I10 ESSENTIAL HYPERTENSION: ICD-10-CM

## 2020-11-20 RX ORDER — MONTELUKAST SODIUM 10 MG/1
10 TABLET ORAL NIGHTLY
Qty: 90 TABLET | Refills: 1 | Status: SHIPPED | OUTPATIENT
Start: 2020-11-20 | End: 2021-03-10

## 2020-11-20 RX ORDER — LOSARTAN POTASSIUM 50 MG/1
50 TABLET ORAL
Qty: 90 TABLET | Refills: 0 | Status: SHIPPED | OUTPATIENT
Start: 2020-11-20 | End: 2020-12-03

## 2020-11-20 RX ORDER — LEVOCETIRIZINE DIHYDROCHLORIDE 5 MG/1
5 TABLET, FILM COATED ORAL EVERY EVENING
Qty: 90 TABLET | Refills: 1 | Status: SHIPPED | OUTPATIENT
Start: 2020-11-20 | End: 2020-12-03

## 2020-11-20 RX ORDER — ROSUVASTATIN CALCIUM 5 MG/1
5 TABLET, COATED ORAL NIGHTLY
Qty: 90 TABLET | Refills: 0 | Status: SHIPPED | OUTPATIENT
Start: 2020-11-20 | End: 2021-01-29

## 2020-11-20 RX ORDER — MOMETASONE FUROATE 1 MG/G
1 CREAM TOPICAL 2 TIMES DAILY PRN
Qty: 50 G | Refills: 0 | OUTPATIENT
Start: 2020-11-20

## 2020-11-20 RX ORDER — HYDROCODONE BITARTRATE AND ACETAMINOPHEN 10; 325 MG/1; MG/1
1 TABLET ORAL EVERY 6 HOURS PRN
Qty: 40 TABLET | Refills: 0 | OUTPATIENT
Start: 2020-11-20

## 2020-12-03 DIAGNOSIS — J34.3 HYPERTROPHY OF NASAL TURBINATES: ICD-10-CM

## 2020-12-03 DIAGNOSIS — R09.81 NASAL CONGESTION: ICD-10-CM

## 2020-12-03 DIAGNOSIS — I10 ESSENTIAL HYPERTENSION: ICD-10-CM

## 2020-12-04 ENCOUNTER — PATIENT MESSAGE (OUTPATIENT)
Dept: FAMILY MEDICINE CLINIC | Facility: CLINIC | Age: 40
End: 2020-12-04

## 2020-12-04 RX ORDER — LEVOCETIRIZINE DIHYDROCHLORIDE 5 MG/1
5 TABLET, FILM COATED ORAL EVERY EVENING
Qty: 90 TABLET | Refills: 1 | Status: SHIPPED | OUTPATIENT
Start: 2020-12-04 | End: 2021-03-10

## 2020-12-04 RX ORDER — MOMETASONE FUROATE 1 MG/G
1 CREAM TOPICAL 2 TIMES DAILY PRN
Qty: 50 G | Refills: 0 | OUTPATIENT
Start: 2020-12-04

## 2020-12-04 RX ORDER — HYDROCODONE BITARTRATE AND ACETAMINOPHEN 10; 325 MG/1; MG/1
1 TABLET ORAL EVERY 6 HOURS PRN
Qty: 40 TABLET | Refills: 0 | OUTPATIENT
Start: 2020-12-04

## 2020-12-04 RX ORDER — FLUTICASONE PROPIONATE 50 MCG
2 SPRAY, SUSPENSION (ML) NASAL DAILY
Qty: 1 BOTTLE | Refills: 3 | Status: SHIPPED | OUTPATIENT
Start: 2020-12-04 | End: 2021-03-09

## 2020-12-04 RX ORDER — LOSARTAN POTASSIUM 50 MG/1
50 TABLET ORAL
Qty: 90 TABLET | Refills: 0 | Status: SHIPPED | OUTPATIENT
Start: 2020-12-04 | End: 2021-02-02

## 2020-12-04 NOTE — TELEPHONE ENCOUNTER
From: Romeo Mcghee  To: Aretha Chan DO  Sent: 12/4/2020 3:02 PM CST  Subject: Prescription Question    I need refills on Flonase

## 2020-12-18 ENCOUNTER — TELEPHONE (OUTPATIENT)
Dept: FAMILY MEDICINE CLINIC | Facility: CLINIC | Age: 40
End: 2020-12-18

## 2020-12-18 RX ORDER — MOMETASONE FUROATE 1 MG/G
1 CREAM TOPICAL 2 TIMES DAILY PRN
Qty: 50 G | Refills: 0 | OUTPATIENT
Start: 2020-12-18

## 2020-12-18 NOTE — TELEPHONE ENCOUNTER
Current Outpatient Medications:   ••  Mometasone Furoate 0.1 % External Cream, Apply 1 Application topically 2 (two) times daily as needed. , Disp: 50 g, Rfl: 0

## 2020-12-21 ENCOUNTER — OFFICE VISIT (OUTPATIENT)
Dept: DERMATOLOGY CLINIC | Facility: CLINIC | Age: 40
End: 2020-12-21
Payer: COMMERCIAL

## 2020-12-21 DIAGNOSIS — L30.9 DERMATITIS: Primary | ICD-10-CM

## 2020-12-21 PROCEDURE — 99202 OFFICE O/P NEW SF 15 MIN: CPT | Performed by: DERMATOLOGY

## 2020-12-21 RX ORDER — TACROLIMUS 1 MG/G
OINTMENT TOPICAL
Qty: 100 G | Refills: 12 | Status: SHIPPED | OUTPATIENT
Start: 2020-12-21 | End: 2022-02-01 | Stop reason: ALTCHOICE

## 2020-12-21 RX ORDER — MOMETASONE FUROATE 1 MG/G
CREAM TOPICAL
Qty: 45 G | Refills: 0 | OUTPATIENT
Start: 2020-12-21

## 2020-12-21 RX ORDER — CLOBETASOL PROPIONATE 0.5 MG/G
OINTMENT TOPICAL
Qty: 60 G | Refills: 2 | Status: SHIPPED | OUTPATIENT
Start: 2020-12-21 | End: 2021-01-18

## 2020-12-22 ENCOUNTER — PATIENT MESSAGE (OUTPATIENT)
Dept: DERMATOLOGY CLINIC | Facility: CLINIC | Age: 40
End: 2020-12-22

## 2020-12-22 ENCOUNTER — TELEPHONE (OUTPATIENT)
Dept: DERMATOLOGY CLINIC | Facility: CLINIC | Age: 40
End: 2020-12-22

## 2020-12-22 NOTE — TELEPHONE ENCOUNTER
Fax from [address] [place] [address] started for Clobetasol Propionate 0.05 % External Ointment    KEY: [id number]    Placed in pa inbox

## 2020-12-23 NOTE — TELEPHONE ENCOUNTER
From: Linn Campos  To: Lei Bland MD  Sent: 12/22/2020 4:33 PM CST  Subject: Prescription Question    Insurance will cover needs pa done

## 2020-12-28 ENCOUNTER — PATIENT MESSAGE (OUTPATIENT)
Dept: FAMILY MEDICINE CLINIC | Facility: CLINIC | Age: 40
End: 2020-12-28

## 2020-12-28 NOTE — TELEPHONE ENCOUNTER
Fax from [address] [facility] required for Clobetasol.      Please call [phone] id# [id number]

## 2020-12-29 NOTE — TELEPHONE ENCOUNTER
Left message for patient, this PA will need to be initiated on 01/01/2021 when insurance benefits changed.

## 2020-12-29 NOTE — TELEPHONE ENCOUNTER
From: Danny Venegas  To: Cherelle Chan DO  Sent: 12/28/2020 7:12 PM CST  Subject: Prescription Question    I need a prior authorization done for my pro air inhaler my insurance does not pay for that prescription anymore so they told me I need t

## 2020-12-30 NOTE — TELEPHONE ENCOUNTER
[facility] states that clobetasol will be covered by [employer] if pt uses rx daily. Directions changed. [address].

## 2020-12-30 NOTE — TELEPHONE ENCOUNTER
Pharmacy contacted and states that Clobetasol is not covered with the changed directions. PA needed.

## 2021-01-04 NOTE — TELEPHONE ENCOUNTER
Prior authorization for ProAir HFA completed w/ [employer] [facility] pharmacy on cover my meds Key: [id number], turn around time 1-5 days.

## 2021-01-05 ENCOUNTER — PATIENT MESSAGE (OUTPATIENT)
Dept: FAMILY MEDICINE CLINIC | Facility: CLINIC | Age: 41
End: 2021-01-05

## 2021-01-05 NOTE — TELEPHONE ENCOUNTER
From: Apollo Partida  To: Sunni Chan DO  Sent: 1/5/2021 2:03 PM CST  Subject: Prescription Question    Can you please send the pro air inhaler prescription to the [facility] in [facility] on [address]

## 2021-01-05 NOTE — TELEPHONE ENCOUNTER
Received a fax from [address] [place] [address] and in the notes its says: pharmacy not contracted.      Left message for patient to call his insurance to see what his preferred pharmacy is so that we can send his proair to that pharmacy

## 2021-01-06 RX ORDER — ALBUTEROL SULFATE 90 UG/1
2 AEROSOL, METERED RESPIRATORY (INHALATION) EVERY 4 HOURS PRN
Qty: 8.5 G | Refills: 11 | Status: SHIPPED | OUTPATIENT
Start: 2021-01-06 | End: 2021-09-14

## 2021-01-06 RX ORDER — ALBUTEROL SULFATE 90 UG/1
2 AEROSOL, METERED RESPIRATORY (INHALATION) EVERY 4 HOURS PRN
Qty: 8.5 G | Refills: 11 | Status: SHIPPED | OUTPATIENT
Start: 2021-01-06 | End: 2021-01-06

## 2021-01-06 NOTE — TELEPHONE ENCOUNTER
From: Michael Parents  To: Annabella Chan DO  Sent: 1/5/2021 4:41 PM CST  Subject: Prescription Question    My insurance said that I need a prescription for generic form of inhaler to be covered but they don't say a drug name just albutoral

## 2021-01-06 NOTE — TELEPHONE ENCOUNTER
Please see other [employer] message.  Patient sent several [employer] messages on this same request.

## 2021-01-06 NOTE — TELEPHONE ENCOUNTER
Patient is requesting Albuterol HFA to be sent to [facility].      Moisés Craig  to Braydon West, DO        1/5/21 7:25 PM  Can you please send in a new script for the pro air inhaler for generic, please send to [facility] in [place]

## 2021-01-06 NOTE — TELEPHONE ENCOUNTER
From: Brendan Short  To: Marsha Chan DO  Sent: 1/5/2021 7:25 PM CST  Subject: Prescription Question    Can you please send in a new script for the pro air inhaler for generic, please send to [facility] in [address] [place] [address] 402

## 2021-01-06 NOTE — TELEPHONE ENCOUNTER
Routed to Dr Megan Pratt for advise, thanks. See other [employer] message. No future appointments.

## 2021-01-06 NOTE — TELEPHONE ENCOUNTER
From: Cassie Tobin  To: Shaye Chan DO  Sent: 1/5/2021 6:54 PM CST  Subject: Prescription Question    I need a new prescription for albuterol inhaler insurance won't pay for pro air it would cost me 65 dollars

## 2021-01-06 NOTE — TELEPHONE ENCOUNTER
From: Brendan Short  To: Marsha Chan DO  Sent: 1/5/2021 3:47 PM CST  Subject: Prescription Question    Can you please put generic allowed on pro air prescription

## 2021-01-06 NOTE — TELEPHONE ENCOUNTER
Please see other [employer] message.  Patient sent several [employer] messages on this same request.

## 2021-01-06 NOTE — TELEPHONE ENCOUNTER
Please contact pt and clarify, see previous encounter, there was a PA done to get the brand name pro air, pt was contacted about this earlier today now pt requests generic?

## 2021-01-06 NOTE — TELEPHONE ENCOUNTER
Routed to Dr Chelle Mendez for advise, thanks. See other [employer] message. No future appointments.

## 2021-01-07 ENCOUNTER — PATIENT MESSAGE (OUTPATIENT)
Dept: FAMILY MEDICINE CLINIC | Facility: CLINIC | Age: 41
End: 2021-01-07

## 2021-01-07 ENCOUNTER — TELEPHONE (OUTPATIENT)
Dept: FAMILY MEDICINE CLINIC | Facility: CLINIC | Age: 41
End: 2021-01-07

## 2021-01-07 RX ORDER — HYDROCODONE BITARTRATE AND ACETAMINOPHEN 10; 325 MG/1; MG/1
1 TABLET ORAL EVERY 6 HOURS PRN
Qty: 40 TABLET | Refills: 0 | OUTPATIENT
Start: 2021-01-07

## 2021-01-07 NOTE — TELEPHONE ENCOUNTER
Dr. Chana Hubbard, it seems Nichola Sacks called patient and left him a message to change his in person visit to a virtual due to reason    \"Pain when I breath either ribs hurt or chest\"    Pt sent a MyChart stating he wants an in-person evaluation.  Do you want t

## 2021-01-07 NOTE — LETTER
8/7/2020              [facility]         Dear Zuly Mir,    This letter is to inform you that our office has made several attempts to reach you by phone without success.   We were attempting to contact [Follow-Up: _____] : a [unfilled] follow-up visit [Spouse] : spouse [FreeTextEntry1] : epilepsy

## 2021-01-07 NOTE — TELEPHONE ENCOUNTER
Please advise     Visit Type: MYCHART EXAM (2964)      1/8/2021     2:45 PM  15 mins. Izzy Eid DO      [facility]      Patient Comments:   Pain when I breath either ribs hurt or chest

## 2021-01-07 NOTE — TELEPHONE ENCOUNTER
From: Sheila Davidson  To: Leonela Vick DO  Sent: 1/7/2021 9:26 AM CST  Subject: Other    I don't want a virtual visit I want to be seen in the office

## 2021-01-08 ENCOUNTER — TELEPHONE (OUTPATIENT)
Dept: FAMILY MEDICINE CLINIC | Facility: CLINIC | Age: 41
End: 2021-01-08

## 2021-01-08 ENCOUNTER — PATIENT MESSAGE (OUTPATIENT)
Dept: FAMILY MEDICINE CLINIC | Facility: CLINIC | Age: 41
End: 2021-01-08

## 2021-01-08 ENCOUNTER — LAB ENCOUNTER (OUTPATIENT)
Dept: LAB | Facility: HOSPITAL | Age: 41
End: 2021-01-08
Attending: FAMILY MEDICINE
Payer: COMMERCIAL

## 2021-01-08 ENCOUNTER — OFFICE VISIT (OUTPATIENT)
Dept: FAMILY MEDICINE CLINIC | Facility: CLINIC | Age: 41
End: 2021-01-08
Payer: COMMERCIAL

## 2021-01-08 ENCOUNTER — HOSPITAL ENCOUNTER (OUTPATIENT)
Dept: GENERAL RADIOLOGY | Facility: HOSPITAL | Age: 41
Discharge: HOME | End: 2021-01-08
Attending: FAMILY MEDICINE
Payer: COMMERCIAL

## 2021-01-08 VITALS
HEART RATE: 84 BPM | SYSTOLIC BLOOD PRESSURE: 156 MMHG | DIASTOLIC BLOOD PRESSURE: 80 MMHG | BODY MASS INDEX: 36.89 KG/M2 | HEIGHT: 69 IN | WEIGHT: 249.06 LBS

## 2021-01-08 DIAGNOSIS — E55.9 VITAMIN D DEFICIENCY: ICD-10-CM

## 2021-01-08 DIAGNOSIS — R10.11 RUQ ABDOMINAL PAIN: Primary | ICD-10-CM

## 2021-01-08 DIAGNOSIS — R10.11 RUQ ABDOMINAL PAIN: ICD-10-CM

## 2021-01-08 DIAGNOSIS — K80.20 GALLSTONES: ICD-10-CM

## 2021-01-08 LAB
ALBUMIN SERPL-MCNC: 3.9 G/DL (ref 3.4–5)
ALP LIVER SERPL-CCNC: 69 U/L
ALT SERPL-CCNC: 36 U/L
AST SERPL-CCNC: 20 U/L (ref 15–37)
BASOPHILS # BLD AUTO: 0.11 X10(3) UL (ref 0–0.2)
BASOPHILS NFR BLD AUTO: 1.2 %
BILIRUB DIRECT SERPL-MCNC: 0.2 MG/DL (ref 0–0.2)
BILIRUB SERPL-MCNC: 0.7 MG/DL (ref 0.1–2)
DEPRECATED RDW RBC AUTO: 38 FL (ref 35.1–46.3)
EOSINOPHIL # BLD AUTO: 0.62 X10(3) UL (ref 0–0.7)
EOSINOPHIL NFR BLD AUTO: 7 %
ERYTHROCYTE [DISTWIDTH] IN BLOOD BY AUTOMATED COUNT: 12.4 % (ref 11–15)
HCT VFR BLD AUTO: 43.8 %
HGB BLD-MCNC: 14.7 G/DL
IMM GRANULOCYTES # BLD AUTO: 0.01 X10(3) UL (ref 0–1)
IMM GRANULOCYTES NFR BLD: 0.1 %
LIPASE SERPL-CCNC: 110 U/L (ref 73–393)
LYMPHOCYTES # BLD AUTO: 2.62 X10(3) UL (ref 1–4)
LYMPHOCYTES NFR BLD AUTO: 29.7 %
M PROTEIN MFR SERPL ELPH: 7.2 G/DL (ref 6.4–8.2)
MCH RBC QN AUTO: 28.3 PG (ref 26–34)
MCHC RBC AUTO-ENTMCNC: 33.6 G/DL (ref 31–37)
MCV RBC AUTO: 84.4 FL
MONOCYTES # BLD AUTO: 0.59 X10(3) UL (ref 0.1–1)
MONOCYTES NFR BLD AUTO: 6.7 %
NEUTROPHILS # BLD AUTO: 4.87 X10 (3) UL (ref 1.5–7.7)
NEUTROPHILS # BLD AUTO: 4.87 X10(3) UL (ref 1.5–7.7)
NEUTROPHILS NFR BLD AUTO: 55.3 %
PLATELET # BLD AUTO: 252 10(3)UL (ref 150–450)
RBC # BLD AUTO: 5.19 X10(6)UL
WBC # BLD AUTO: 8.8 X10(3) UL (ref 4–11)

## 2021-01-08 PROCEDURE — 85025 COMPLETE CBC W/AUTO DIFF WBC: CPT

## 2021-01-08 PROCEDURE — 36415 COLL VENOUS BLD VENIPUNCTURE: CPT

## 2021-01-08 PROCEDURE — 71046 X-RAY EXAM CHEST 2 VIEWS: CPT | Performed by: FAMILY MEDICINE

## 2021-01-08 PROCEDURE — 3079F DIAST BP 80-89 MM HG: CPT | Performed by: FAMILY MEDICINE

## 2021-01-08 PROCEDURE — 3077F SYST BP >= 140 MM HG: CPT | Performed by: FAMILY MEDICINE

## 2021-01-08 PROCEDURE — 80076 HEPATIC FUNCTION PANEL: CPT

## 2021-01-08 PROCEDURE — 3008F BODY MASS INDEX DOCD: CPT | Performed by: FAMILY MEDICINE

## 2021-01-08 PROCEDURE — 83690 ASSAY OF LIPASE: CPT

## 2021-01-08 PROCEDURE — 82306 VITAMIN D 25 HYDROXY: CPT

## 2021-01-08 PROCEDURE — 99214 OFFICE O/P EST MOD 30 MIN: CPT | Performed by: FAMILY MEDICINE

## 2021-01-08 NOTE — TELEPHONE ENCOUNTER
From: Antwan Day  To: Jordi Chan DO  Sent: 1/8/2021 8:55 AM CST  Subject: Test Results Question    Can you tell me if a X-ray order was put in ,

## 2021-01-08 NOTE — TELEPHONE ENCOUNTER
Patient stated that his appointment was cancelled for tomorrow. Patient does not want a virtual appointment but wants to be seen in the office and have an x-ray.  States was positive for COVID on 11/4/2020 and since then has been having on and off right low

## 2021-01-08 NOTE — PROGRESS NOTES
Blood pressure 156/80, pulse 84, height 5' 9\" (1.753 m), weight 249 lb 1 oz (113 kg). Presents today complaining of right upper quadrant abdominal pain for the past week. Has had issues with discomfort there previously.   Was told he had gallstones the

## 2021-01-09 NOTE — TELEPHONE ENCOUNTER
Lab called and reported that CBC, LFT and lipase are normal. I notified patient. Patient states that he will call to schedule US tomorrow.

## 2021-01-11 LAB — 25(OH)D3 SERPL-MCNC: 26.5 NG/ML (ref 30–100)

## 2021-01-11 RX ORDER — HYDROCODONE BITARTRATE AND ACETAMINOPHEN 10; 325 MG/1; MG/1
1 TABLET ORAL EVERY 6 HOURS PRN
Qty: 20 TABLET | Refills: 0 | Status: SHIPPED | OUTPATIENT
Start: 2021-01-11 | End: 2021-01-30

## 2021-01-13 ENCOUNTER — HOSPITAL ENCOUNTER (OUTPATIENT)
Dept: ULTRASOUND IMAGING | Age: 41
Discharge: HOME | End: 2021-01-13
Attending: FAMILY MEDICINE
Payer: COMMERCIAL

## 2021-01-13 DIAGNOSIS — R10.11 RUQ ABDOMINAL PAIN: ICD-10-CM

## 2021-01-13 PROCEDURE — 76705 ECHO EXAM OF ABDOMEN: CPT | Performed by: FAMILY MEDICINE

## 2021-01-19 ENCOUNTER — OFFICE VISIT (OUTPATIENT)
Dept: SURGERY | Facility: CLINIC | Age: 41
End: 2021-01-19
Payer: COMMERCIAL

## 2021-01-19 VITALS — WEIGHT: 249 LBS | BODY MASS INDEX: 37 KG/M2

## 2021-01-19 DIAGNOSIS — K80.20 CALCULUS OF GALLBLADDER WITHOUT CHOLECYSTITIS WITHOUT OBSTRUCTION: Primary | ICD-10-CM

## 2021-01-19 PROCEDURE — 99204 OFFICE O/P NEW MOD 45 MIN: CPT | Performed by: SURGERY

## 2021-01-20 NOTE — H&P
HPI:    Patient ID: Magdaleno Lechuga is a 45year old male presenting with Patient presents with:  Gallbladder: Has had problem for over 3 years. Stopped for some time, but began again. US shows gallstone. Pain occasionally RUQ.   Denies nausea/vomit smokes outside    Substance and Sexual Activity      Alcohol use: Never        Alcohol/week: 0.0 standard drinks        Frequency: Never      Drug use: No      Sexual activity: Not on file    Lifestyle      Physical activity        Days per week: Not on fi topically 2 (two) times daily. 60 g 2   • predniSONE 10 MG Oral Tab Take 4 tabs by mouth for 4days, then 3 tabs for 4 days, then 2 tabs for 4 days, then 1 tab for 4 days.  40 tablet 0   • HYDROcodone-acetaminophen (NORCO)  MG Oral Tab Take 1 tablet by swelling  Atorvastatin            OTHER (SEE COMMENTS)    Comment:Other reaction(s): ruq abd pain  Ibuprofen               OTHER (SEE COMMENTS)    Comment:Other reaction(s): Trouble Breathing   PHYSICAL EXAM:   Wt 249 lb (112.9 kg)   BMI 36.77 kg/m²   Phys

## 2021-01-25 ENCOUNTER — TELEPHONE (OUTPATIENT)
Dept: SURGERY | Facility: CLINIC | Age: 41
End: 2021-01-25

## 2021-01-25 ENCOUNTER — PATIENT MESSAGE (OUTPATIENT)
Dept: SURGERY | Facility: CLINIC | Age: 41
End: 2021-01-25

## 2021-01-25 DIAGNOSIS — K80.20 CALCULUS OF GALLBLADDER WITHOUT CHOLECYSTITIS WITHOUT OBSTRUCTION: Primary | ICD-10-CM

## 2021-01-26 ENCOUNTER — PATIENT MESSAGE (OUTPATIENT)
Dept: SURGERY | Facility: CLINIC | Age: 41
End: 2021-01-26

## 2021-01-26 DIAGNOSIS — K80.20 CALCULUS OF GALLBLADDER WITHOUT CHOLECYSTITIS WITHOUT OBSTRUCTION: Primary | ICD-10-CM

## 2021-01-26 NOTE — TELEPHONE ENCOUNTER
From: Magdaleno Lechuga  To: Trina Caro MD  Sent: 1/26/2021 7:51 AM CST  Subject: Other    Can you tell me if February 17 is still open for the procedure I am scheduled for February 10 but that day might not work out for me.  February 17 would be better

## 2021-01-29 RX ORDER — ROSUVASTATIN CALCIUM 5 MG/1
TABLET, COATED ORAL
Qty: 90 TABLET | Refills: 0 | Status: SHIPPED | OUTPATIENT
Start: 2021-01-29 | End: 2021-02-25

## 2021-01-30 ENCOUNTER — TELEPHONE (OUTPATIENT)
Dept: FAMILY MEDICINE CLINIC | Facility: CLINIC | Age: 41
End: 2021-01-30

## 2021-01-30 ENCOUNTER — OFFICE VISIT (OUTPATIENT)
Dept: FAMILY MEDICINE CLINIC | Facility: CLINIC | Age: 41
End: 2021-01-30
Payer: COMMERCIAL

## 2021-01-30 VITALS
DIASTOLIC BLOOD PRESSURE: 89 MMHG | HEIGHT: 69 IN | HEART RATE: 71 BPM | BODY MASS INDEX: 36.58 KG/M2 | WEIGHT: 247 LBS | SYSTOLIC BLOOD PRESSURE: 155 MMHG

## 2021-01-30 DIAGNOSIS — K80.20 GALLSTONES: Primary | ICD-10-CM

## 2021-01-30 DIAGNOSIS — M54.31 RIGHT SCIATIC NERVE PAIN: ICD-10-CM

## 2021-01-30 PROCEDURE — 3077F SYST BP >= 140 MM HG: CPT | Performed by: FAMILY MEDICINE

## 2021-01-30 PROCEDURE — 3008F BODY MASS INDEX DOCD: CPT | Performed by: FAMILY MEDICINE

## 2021-01-30 PROCEDURE — 3079F DIAST BP 80-89 MM HG: CPT | Performed by: FAMILY MEDICINE

## 2021-01-30 PROCEDURE — 99214 OFFICE O/P EST MOD 30 MIN: CPT | Performed by: FAMILY MEDICINE

## 2021-01-30 RX ORDER — HYDROCODONE BITARTRATE AND ACETAMINOPHEN 10; 325 MG/1; MG/1
1 TABLET ORAL EVERY 6 HOURS PRN
Qty: 20 TABLET | Refills: 0 | Status: SHIPPED | OUTPATIENT
Start: 2021-01-30 | End: 2021-08-03

## 2021-01-30 RX ORDER — URSODIOL 400 MG/1
1 CAPSULE ORAL 2 TIMES DAILY
Qty: 60 CAPSULE | Refills: 5 | Status: SHIPPED | OUTPATIENT
Start: 2021-01-30 | End: 2021-02-01 | Stop reason: DRUGHIGH

## 2021-01-30 NOTE — PROGRESS NOTES
HPI:    Patient ID: Gage Woo is a 45year old male.     HPI  Patient presents with:  Abdominal Pain: pt c/o gallbladder pain, would like medication to desolve stones recommended from specialist  Medication Request: for pain medication     Review daily. 1 Bottle 3   • Montelukast Sodium 10 MG Oral Tab Take 1 tablet (10 mg total) by mouth nightly. 90 tablet 1   • Mometasone Furoate 0.1 % External Cream Apply 1 Application topically 2 (two) times daily as needed.  50 g 0   • Esomeprazole Magnesium 20 Imaging & Referrals:  None       #[id number]

## 2021-02-01 ENCOUNTER — TELEPHONE (OUTPATIENT)
Dept: FAMILY MEDICINE CLINIC | Facility: CLINIC | Age: 41
End: 2021-02-01

## 2021-02-01 ENCOUNTER — PATIENT MESSAGE (OUTPATIENT)
Dept: FAMILY MEDICINE CLINIC | Facility: CLINIC | Age: 41
End: 2021-02-01

## 2021-02-01 RX ORDER — URSODIOL 500 MG/1
500 TABLET, FILM COATED ORAL 2 TIMES DAILY
Qty: 60 TABLET | Refills: 3 | Status: SHIPPED | OUTPATIENT
Start: 2021-02-01 | End: 2021-05-07

## 2021-02-01 NOTE — TELEPHONE ENCOUNTER
Patient calling and states that his pharmacy  told him that the prescription  Ursodiol dose 400 mg is not existing. Informed that will send the message to his provider.   Called [facility] and spoke with Valdo Nunez, states that Ursodiol doses are only 250 or 500 mg

## 2021-02-01 NOTE — TELEPHONE ENCOUNTER
Per pt is calling to cancel procedure 2/17/21, stating will call office when ready to reschedule.  Please advise

## 2021-02-01 NOTE — TELEPHONE ENCOUNTER
Spoke with Apple Monsalve from [employer], MARNI verified she stated under pt plan the insurance will cover but he will pay $99 due to his deductible but if he goes under cash plan it will only cost him $15 for 90 days. She will call pt and explain the policy.

## 2021-02-01 NOTE — TELEPHONE ENCOUNTER
Patient is calling to follow up regarding medication ROSUVASTATIN CALCIUM 5 MG Oral Tab. Patient states medication is not covered and was advised by insurance that a special request would need to come from Dr. Ganesh Rojo. Please advise.

## 2021-02-01 NOTE — TELEPHONE ENCOUNTER
Message # [phone]         2021 12:52p   [[email or web address]]  To:  From:  KIRAN Paz MD:  Phone#:  ----------------------------------------------------------------------  Brenda Scott [address] RX NO LONGER AVAILABLE Paged

## 2021-02-02 ENCOUNTER — PATIENT MESSAGE (OUTPATIENT)
Dept: FAMILY MEDICINE CLINIC | Facility: CLINIC | Age: 41
End: 2021-02-02

## 2021-02-02 DIAGNOSIS — I10 ESSENTIAL HYPERTENSION: ICD-10-CM

## 2021-02-02 NOTE — TELEPHONE ENCOUNTER
From: Miller Zeng  To: Fidelina Cheng DO  Sent: 2/2/2021 12:13 AM CST  Subject: Prescription Question    Need refill on losartan 50mg and Flonase nasal spray please send to [facility] thank you

## 2021-02-02 NOTE — TELEPHONE ENCOUNTER
Routed to Dr Ben Fuller for advise, thanks. Requested Prescriptions     Pending Prescriptions Disp Refills   • losartan Potassium 50 MG Oral Tab 90 tablet 1     Sig: Take 1 tablet (50 mg total) by mouth once daily.        Last Office Visit with PCP: 1/30/20

## 2021-02-05 RX ORDER — LOSARTAN POTASSIUM 50 MG/1
50 TABLET ORAL
Qty: 90 TABLET | Refills: 1 | Status: SHIPPED | OUTPATIENT
Start: 2021-02-05 | End: 2021-05-28

## 2021-02-24 ENCOUNTER — PATIENT MESSAGE (OUTPATIENT)
Dept: FAMILY MEDICINE CLINIC | Facility: CLINIC | Age: 41
End: 2021-02-24

## 2021-02-24 DIAGNOSIS — E78.5 HYPERLIPIDEMIA, UNSPECIFIED HYPERLIPIDEMIA TYPE: Primary | ICD-10-CM

## 2021-02-24 NOTE — TELEPHONE ENCOUNTER
[address] as Rosuvastatin prescription was transferred to that pharmacy from the [facility].  Per Erin Ruvalcaba at [facility] Rosuvastatin is covered under pt's plan but he has to meet a deductible and the deductible for this med is $203.00 f

## 2021-02-24 NOTE — TELEPHONE ENCOUNTER
From: Danny Venegas  To: Cherelle Chan DO  Sent: 2/24/2021 10:48 AM CST  Subject: Prescription Question    My prescription for my cholesterol medicine is not being covered it's not the preferred choice of medicine so if Dr. Max could switch i

## 2021-02-25 RX ORDER — PRAVASTATIN SODIUM 10 MG
10 TABLET ORAL NIGHTLY
Qty: 30 TABLET | Refills: 3 | Status: SHIPPED | OUTPATIENT
Start: 2021-02-25 | End: 2021-05-25

## 2021-03-03 ENCOUNTER — TELEPHONE (OUTPATIENT)
Dept: OTOLARYNGOLOGY | Facility: CLINIC | Age: 41
End: 2021-03-03

## 2021-03-04 NOTE — TELEPHONE ENCOUNTER
•  Fluticasone Propionate 50 MCG/ACT Nasal Suspension, 2 sprays by Nasal route daily. , Disp: 1 Bottle, Rfl: 3      RX received from [facility], [facility] for refill    LOV - 2/29/20    Lst refill - 1/30/21    Please advise

## 2021-03-04 NOTE — TELEPHONE ENCOUNTER
I spoke with the patient. Routine yearly follow up scheduled for next week. He states he has enough medication to last until follow up. No further questions at this time.

## 2021-03-08 ENCOUNTER — PATIENT MESSAGE (OUTPATIENT)
Dept: FAMILY MEDICINE CLINIC | Facility: CLINIC | Age: 41
End: 2021-03-08

## 2021-03-08 NOTE — TELEPHONE ENCOUNTER
From: Mayco Fortune  To: Elin Chan DO  Sent: 3/8/2021 1:16 PM CST  Subject: Prescription Question    Can you refill my nose spray

## 2021-03-08 NOTE — TELEPHONE ENCOUNTER
[employer] message sent to pt, await reply. Requested Prescriptions     Pending Prescriptions Disp Refills   • Fluticasone Propionate 50 MCG/ACT Nasal Suspension 1 Bottle 3     Si sprays by Nasal route daily.        Last Office Visit with PCP: 20

## 2021-03-09 DIAGNOSIS — Z23 NEED FOR VACCINATION: ICD-10-CM

## 2021-03-09 RX ORDER — FLUTICASONE PROPIONATE 50 MCG
2 SPRAY, SUSPENSION (ML) NASAL DAILY
Qty: 1 BOTTLE | Refills: 3 | Status: SHIPPED | OUTPATIENT
Start: 2021-03-09 | End: 2021-03-10

## 2021-03-10 ENCOUNTER — OFFICE VISIT (OUTPATIENT)
Dept: OTOLARYNGOLOGY | Facility: CLINIC | Age: 41
End: 2021-03-10
Payer: COMMERCIAL

## 2021-03-10 VITALS
SYSTOLIC BLOOD PRESSURE: 130 MMHG | DIASTOLIC BLOOD PRESSURE: 84 MMHG | BODY MASS INDEX: 35.55 KG/M2 | WEIGHT: 240 LBS | HEIGHT: 69 IN | TEMPERATURE: 99 F

## 2021-03-10 DIAGNOSIS — J34.3 HYPERTROPHY OF NASAL TURBINATES: ICD-10-CM

## 2021-03-10 DIAGNOSIS — J33.9 NASAL POLYPS: Primary | ICD-10-CM

## 2021-03-10 DIAGNOSIS — Z91.09 ENVIRONMENTAL ALLERGIES: ICD-10-CM

## 2021-03-10 DIAGNOSIS — R09.81 NASAL CONGESTION: ICD-10-CM

## 2021-03-10 PROCEDURE — 3008F BODY MASS INDEX DOCD: CPT | Performed by: OTOLARYNGOLOGY

## 2021-03-10 PROCEDURE — 3075F SYST BP GE 130 - 139MM HG: CPT | Performed by: OTOLARYNGOLOGY

## 2021-03-10 PROCEDURE — 99213 OFFICE O/P EST LOW 20 MIN: CPT | Performed by: OTOLARYNGOLOGY

## 2021-03-10 PROCEDURE — 3079F DIAST BP 80-89 MM HG: CPT | Performed by: OTOLARYNGOLOGY

## 2021-03-10 RX ORDER — MONTELUKAST SODIUM 10 MG/1
10 TABLET ORAL NIGHTLY
Qty: 90 TABLET | Refills: 3 | Status: SHIPPED | OUTPATIENT
Start: 2021-03-10 | End: 2021-05-22

## 2021-03-10 RX ORDER — LEVOCETIRIZINE DIHYDROCHLORIDE 5 MG/1
5 TABLET, FILM COATED ORAL EVERY EVENING
Qty: 90 TABLET | Refills: 3 | Status: SHIPPED | OUTPATIENT
Start: 2021-03-10

## 2021-03-10 RX ORDER — FLUTICASONE PROPIONATE 50 MCG
2 SPRAY, SUSPENSION (ML) NASAL DAILY
Qty: 3 BOTTLE | Refills: 4 | Status: SHIPPED | OUTPATIENT
Start: 2021-03-10

## 2021-03-11 NOTE — PROGRESS NOTES
Bina Aparicio is a 45year old male. Patient presents with:   Follow - Up: nasal polyps      HISTORY OF PRESENT ILLNESS  12/14/2019  Patient is very miserable he never breathes through his nose he feels like is worse the only thing this helped in th Health  Financial Resource Strain:       Difficulty of Paying Living Expenses:   Food Insecurity:       Worried About [address] in the Last Year:       Ran Out of Food in the Last Year:   Transportation Needs:       Lack of Transportation (Medica Neg/Pos Details   Constitutional Negative Fatigue, fever and weight loss. ENMT Negative Drooling. Eyes Negative Blurred vision and vision changes. Respiratory Negative Dyspnea and wheezing.    Cardio Negative Chest pain, irregular heartbeat/palpitatio Rfl: 3  •  Montelukast Sodium 10 MG Oral Tab, Take 1 tablet (10 mg total) by mouth nightly., Disp: 90 tablet, Rfl: 3  •  Fluticasone Propionate 50 MCG/ACT Nasal Suspension, 2 sprays by Nasal route daily. , Disp: 3 Bottle, Rfl: 4  •  Pravastatin Sodium 10 MG on 3/10/2021 ), Disp: 40 tablet, Rfl: 0  ASSESSMENT AND PLAN    1.   History nasal polyps /samters triad   status post sinus surgery 1 year ago no evidence of recurrence he is doing great on allergy medications and I refilled his antihistamine Singulair and

## 2021-03-27 ENCOUNTER — OFFICE VISIT (OUTPATIENT)
Dept: FAMILY MEDICINE CLINIC | Facility: CLINIC | Age: 41
End: 2021-03-27
Payer: COMMERCIAL

## 2021-03-27 VITALS
BODY MASS INDEX: 36.29 KG/M2 | HEIGHT: 69 IN | DIASTOLIC BLOOD PRESSURE: 77 MMHG | WEIGHT: 245 LBS | HEART RATE: 73 BPM | SYSTOLIC BLOOD PRESSURE: 123 MMHG

## 2021-03-27 DIAGNOSIS — J45.20 MILD INTERMITTENT ASTHMA WITHOUT COMPLICATION: ICD-10-CM

## 2021-03-27 DIAGNOSIS — J45.40 MODERATE PERSISTENT ASTHMA WITHOUT COMPLICATION: ICD-10-CM

## 2021-03-27 DIAGNOSIS — Z00.00 ROUTINE GENERAL MEDICAL EXAMINATION AT A HEALTH CARE FACILITY: Primary | ICD-10-CM

## 2021-03-27 DIAGNOSIS — E55.9 VITAMIN D DEFICIENCY: ICD-10-CM

## 2021-03-27 DIAGNOSIS — E78.5 HYPERLIPIDEMIA, UNSPECIFIED HYPERLIPIDEMIA TYPE: ICD-10-CM

## 2021-03-27 DIAGNOSIS — K80.44 CALCULUS OF BILE DUCT WITH CHRONIC CHOLECYSTITIS WITHOUT OBSTRUCTION: ICD-10-CM

## 2021-03-27 DIAGNOSIS — I10 ESSENTIAL HYPERTENSION: ICD-10-CM

## 2021-03-27 DIAGNOSIS — K21.9 GASTROESOPHAGEAL REFLUX DISEASE WITHOUT ESOPHAGITIS: ICD-10-CM

## 2021-03-27 PROCEDURE — 3074F SYST BP LT 130 MM HG: CPT | Performed by: FAMILY MEDICINE

## 2021-03-27 PROCEDURE — 99395 PREV VISIT EST AGE 18-39: CPT | Performed by: FAMILY MEDICINE

## 2021-03-27 PROCEDURE — 3008F BODY MASS INDEX DOCD: CPT | Performed by: FAMILY MEDICINE

## 2021-03-27 PROCEDURE — 3078F DIAST BP <80 MM HG: CPT | Performed by: FAMILY MEDICINE

## 2021-03-27 RX ORDER — FLUTICASONE PROPIONATE AND SALMETEROL 250; 50 UG/1; UG/1
1 POWDER RESPIRATORY (INHALATION) EVERY 12 HOURS SCHEDULED
Qty: 1 EACH | Refills: 0 | Status: SHIPPED | OUTPATIENT
Start: 2021-03-27 | End: 2021-04-10

## 2021-03-27 NOTE — PROGRESS NOTES
HPI/Subjective:   Patient ID: Todd Novak is a 45year old male.     HPI  Patient presents with:  Routine Physical: annual physical   Complete Form: from to complete for work   Breathing Problem: having persistant SOB since testing positive for cov hours as needed for Wheezing. 8.5 g 11   • Halobetasol Propionate 0.05 % External Cream Apply thin layer to affected area(s). tid 50 g 3   • mupirocin 2 % External Ointment Use tid to afected areas as instsructed 22 g 3   • tacrolimus (PROTOPIC) 0.1 % Exter fissuring. Neurological:      Mental Status: He is alert and oriented to person, place, and time. Deep Tendon Reflexes: Reflexes are normal and symmetric. Psychiatric:         Mood and Affect: Mood is not anxious or depressed.          Assessment

## 2021-04-05 RX ORDER — ERGOCALCIFEROL 1.25 MG/1
50000 CAPSULE ORAL WEEKLY
Qty: 12 CAPSULE | Refills: 0 | OUTPATIENT
Start: 2021-04-05 | End: 2021-07-04

## 2021-04-07 ENCOUNTER — PATIENT MESSAGE (OUTPATIENT)
Dept: FAMILY MEDICINE CLINIC | Facility: CLINIC | Age: 41
End: 2021-04-07

## 2021-04-08 NOTE — TELEPHONE ENCOUNTER
From: Apollo Partida  To: Hattie Woo DO  Sent: 4/7/2021 11:28 PM CDT  Subject: Visit Follow-up Question    Can you tell the doctor that the medicine that he gave me called wixela 250/50 is working great I haven't used my albuterol inhaler since

## 2021-04-09 ENCOUNTER — PATIENT MESSAGE (OUTPATIENT)
Dept: FAMILY MEDICINE CLINIC | Facility: CLINIC | Age: 41
End: 2021-04-09

## 2021-04-10 NOTE — TELEPHONE ENCOUNTER
From: Gorge Justin  To: Lizeth Gross DO  Sent: 4/9/2021 5:41 PM CDT  Subject: Prescription Question    Ok I will try what the doctor said, is he going to Send more refills to my pharmacy, please send to [place]

## 2021-04-10 NOTE — TELEPHONE ENCOUNTER
Noted per previous MyChart message pt had question about the [facility] inhaler. Pt requesting refills. Med pended for review, please advise.

## 2021-04-12 RX ORDER — FLUTICASONE PROPIONATE AND SALMETEROL 250; 50 UG/1; UG/1
1 POWDER RESPIRATORY (INHALATION) EVERY 12 HOURS SCHEDULED
Qty: 1 EACH | Refills: 2 | Status: SHIPPED | OUTPATIENT
Start: 2021-04-12 | End: 2021-05-26

## 2021-04-18 ENCOUNTER — PATIENT MESSAGE (OUTPATIENT)
Dept: FAMILY MEDICINE CLINIC | Facility: CLINIC | Age: 41
End: 2021-04-18

## 2021-04-19 NOTE — TELEPHONE ENCOUNTER
From: Bina Aparicio  To: Anton Chan DO  Sent: 4/18/2021 12:19 AM CDT  Subject: Prescription Question    Can I get a prescription for priligy for sexual issues .  I would like to try and see if this helps

## 2021-04-19 NOTE — TELEPHONE ENCOUNTER
Dr Eugene Rosenbaum,   Patient had a  Physical 3/27/21, would you like him to make an appointment to discuss sildenafil? Please reply to pool: BRANDEN Bacon      From: Fernando Horner  To: Chapis House.  DO Rocio  Sent: 4/18/2021 12:06 AM CDT  Subject: Prescript

## 2021-05-07 RX ORDER — URSODIOL 500 MG/1
TABLET, FILM COATED ORAL
Qty: 60 TABLET | Refills: 3 | Status: SHIPPED | OUTPATIENT
Start: 2021-05-07 | End: 2021-05-26

## 2021-05-10 ENCOUNTER — PATIENT MESSAGE (OUTPATIENT)
Dept: FAMILY MEDICINE CLINIC | Facility: CLINIC | Age: 41
End: 2021-05-10

## 2021-05-10 ENCOUNTER — TELEPHONE (OUTPATIENT)
Dept: FAMILY MEDICINE CLINIC | Facility: CLINIC | Age: 41
End: 2021-05-10

## 2021-05-10 NOTE — TELEPHONE ENCOUNTER
Pt has a PX on 3/24/21 and had Dr fill out a form for his job. Pt stts his job lost the form and would like a copy of it again. Pt said he would pick it up when ready.  Please advise

## 2021-05-10 NOTE — TELEPHONE ENCOUNTER
Release of information department can you please assist.   I sent the patient how to do on [employer] also.           From: Linda Frey  To: Olivia Nuñez DO  Sent: 5/10/2021  2:46 PM CDT  Subject: Visit Follow-up Question    I need a copy of the p [Takes medication as prescribed] : takes

## 2021-05-10 NOTE — TELEPHONE ENCOUNTER
Nurse called; per pt - he requested a copy of most recent office visit notes to be placed at the  for a . Notes placed at the front. Pt notified.

## 2021-05-21 ENCOUNTER — PATIENT MESSAGE (OUTPATIENT)
Dept: FAMILY MEDICINE CLINIC | Facility: CLINIC | Age: 41
End: 2021-05-21

## 2021-05-22 RX ORDER — MONTELUKAST SODIUM 10 MG/1
10 TABLET ORAL NIGHTLY
Qty: 90 TABLET | Refills: 3 | Status: SHIPPED | OUTPATIENT
Start: 2021-05-22

## 2021-05-22 NOTE — TELEPHONE ENCOUNTER
From: Bina Aparicio  To: Gabby Hawthorne DO  Sent: 5/21/2021 6:04 PM CDT  Subject: Prescription Question    I need refills on the singular

## 2021-05-25 DIAGNOSIS — I10 ESSENTIAL HYPERTENSION: ICD-10-CM

## 2021-05-25 RX ORDER — PRAVASTATIN SODIUM 10 MG
10 TABLET ORAL NIGHTLY
Qty: 90 TABLET | Refills: 0 | Status: SHIPPED | OUTPATIENT
Start: 2021-05-25 | End: 2021-09-22

## 2021-05-28 RX ORDER — FLUTICASONE PROPIONATE AND SALMETEROL 250; 50 UG/1; UG/1
1 POWDER RESPIRATORY (INHALATION) EVERY 12 HOURS SCHEDULED
Qty: 1 EACH | Refills: 2 | Status: SHIPPED | OUTPATIENT
Start: 2021-05-28 | End: 2021-09-09

## 2021-05-28 RX ORDER — LOSARTAN POTASSIUM 50 MG/1
50 TABLET ORAL DAILY
Qty: 90 TABLET | Refills: 3 | Status: SHIPPED | OUTPATIENT
Start: 2021-05-28 | End: 2022-04-13

## 2021-05-28 RX ORDER — URSODIOL 500 MG/1
500 TABLET, FILM COATED ORAL 2 TIMES DAILY
Qty: 60 TABLET | Refills: 3 | Status: SHIPPED | OUTPATIENT
Start: 2021-05-28 | End: 2021-09-09

## 2021-06-16 ENCOUNTER — TELEPHONE (OUTPATIENT)
Dept: FAMILY MEDICINE CLINIC | Facility: CLINIC | Age: 41
End: 2021-06-16

## 2021-08-04 NOTE — TELEPHONE ENCOUNTER
Please review. Protocol failed or has no protocol. Requested Prescriptions   Pending Prescriptions Disp Refills    HYDROcodone-acetaminophen (NORCO)  MG Oral Tab 20 tablet 0     Sig: Take 1 tablet by mouth every 6 (six) hours as needed for Pain.

## 2021-08-09 RX ORDER — HYDROCODONE BITARTRATE AND ACETAMINOPHEN 10; 325 MG/1; MG/1
1 TABLET ORAL EVERY 6 HOURS PRN
Qty: 20 TABLET | Refills: 0 | Status: SHIPPED | OUTPATIENT
Start: 2021-08-09 | End: 2021-09-28

## 2021-08-16 NOTE — TELEPHONE ENCOUNTER
[facility]  Discharge- Kierra Power 6/29/1929, 81 y o  female MRN: [id number]  Unit/Bed#: MS [facility]-02 Encounter: [id number]  Primary Care Provider: Boston Chavez MD   Date and time admitted to hospital: 8/14/2021  8:54 AM    * Acute on chronic combined systolic and diastolic heart failure [facility])  Assessment & Plan  Wt Readings from Last 3 Encounters:   08/16/21 55 5 kg (122 lb 5 7 oz)   12/07/20 57 1 kg (125 lb 14 1 oz)   11/10/20 58 1 kg (128 lb)     · Presented with exertional shortness of breath, tachypnea  Patient stopped taking her Lasix for the last month  · EF of 40% on nuclear scan noted in November, 2021  Repeat echocardiogram today shows LVEF of 40%  · Elevated BNP compared to baseline  1249<--623  · Continue 20 mg IV lasix daily -with switched to p o  Lasix on discharge  · Monitor I&Os, wt daily, continue fluid restriction  · Follow-up cardiology outpatient        A-fib [facility])  Assessment & Plan  · On telemetry patient have atrial flutter/fib  · Seems to be new onset  · Cardiology input appreciated  · Rate controlled- cardiology recommend continue with carvedilol  · Was initiated on apixaban 2 5 mg b i d  Due to her high risk for stroke  · Echocardiogram shows LVEF of 40%  No regional wall motion abnormalities      VSD (ventricular septal defect)  Assessment & Plan  · Hx of post-infarct VSD complicated by cardiogenic shock s/p emergent VSD repair--Jan 2009    Diabetes mellitus type 2 in nonobese [facility])  Assessment & Plan  Lab Results   Component Value Date    HGBA1C 6 5 (H) 07/13/2020       Recent Labs     08/15/21  1528 08/15/21  2045 08/16/21  0536 08/16/21  1047   POCGLU 106 160* 121 203*       Blood Sugar Average: Last 72 hrs:  (P) 161 [id number]     · Resume home regimen    Stage 3b chronic kidney disease [facility])  Assessment & Plan  Lab Results   Component Value Date    EGFR 30 08/15/2021    EGFR 29 08/14/2021    EGFR 26 11/09/2020    CREATININE 1 50 (H) Patient having chest pain, feels like his esophag is closing, cannot eat.   Patient states also she has him to this with a spoon  spray on spoon  But it is not helping  Patient asking to s/p with the nurse or dr Yg Burns 08/15/2021    CREATININE 1 56 (H) 08/14/2021    CREATININE 1 69 (H) 11/09/2020   · Stable  Baseline around 1 6  Hyperlipidemia  Assessment & Plan  · Continue statin    Essential hypertension  Assessment & Plan  · controlled  · Continue carvedilol, losartan and furosemide      Coronary artery disease involving native coronary artery of native heart without angina pectoris  Assessment & Plan  · Status post drug eluting stent and 2009  · Stable-no chest pain-no ischemic changes noted  · No troponin elevation  · Continue carvedilol, losartan, simvastatin  · Started on apixaban for new onset atrial fibrillation          Discharging Physician / Practitioner: Vivian Ma  PCP: Marissa Vidal MD  Admission Date:   Admission Orders (From admission, onward)     Ordered        08/15/21 1536  Inpatient Admission  Once         08/14/21 1115  Place in Observation  Once                   Discharge Date: 08/16/21    Medical Problems     Resolved Problems  Date Reviewed: 8/16/2021    None                Consultations During Hospital Stay:  · Cardiology    Procedures Performed:   · None    Significant Findings / Test Results:   · Echocardiogram shows LVEF of 40%  · Chest x-ray-no acute cardiopulmonary disease    Incidental Findings:   · None     Test Results Pending at Discharge (will require follow up): · None      Outpatient Tests Requested:  · Follow-up PCP  · Follow-up with cardiology    Complications:     None    Reason for Admission:  Worsening shortness of breath    Hospital Course:     Paul Lewis is a 81 y o  female patient who originally presented to the hospital on 8/14/2021 due to worsening shortness of breath  Please refer to H&P for initial presenting complaint  Brief the patient presented with worsening exertional shortness of breath with orthopnea and generalized weakness  She subsequently was admitted for acute on chronic congestive heart failure    The patient was supposed to be on furosemide 20 mg daily however stop taking it for the past month  The patient received IV diuresis  Cardiology evaluation  Echocardiogram results noted above  She was noted to be in a flutter/atrial fibrillation  As her stroke risk is high she was initiated on apixaban 2 5 mg b i d   On discharge the patient was transitioned to oral furosemide which cardiology recommend to take daily  Recommend to follow-up with her primary cardiology  Please see above list of diagnoses and related plan for additional information  Condition at Discharge: good     Discharge Day Visit / Exam:     Subjective:  Feeling much improved  Denies any dyspnea  No chest pain  Vitals: Blood Pressure: 130/57 (08/16/21 1118)  Pulse: 68 (08/16/21 1118)  Temperature: 97 9 °F (36 6 °C) (08/16/21 1118)  Temp Source: Temporal (08/16/21 1118)  Respirations: 18 (08/16/21 1118)  Height: 5' (152 4 cm) (08/14/21 1401)  Weight - Scale: 55 5 kg (122 lb 5 7 oz) (08/16/21 0540)  SpO2: 97 % (08/16/21 1118)  Exam:   Physical Exam  Vitals and nursing note reviewed  Constitutional:       General: She is not in acute distress  Appearance: Normal appearance  HENT:      Head: Normocephalic and atraumatic  Right Ear: External ear normal       Left Ear: External ear normal       Nose: Nose normal  No rhinorrhea  Mouth/Throat:      Mouth: Mucous membranes are moist       Pharynx: Oropharynx is clear  Eyes:      General:         Right eye: No discharge  Left eye: No discharge  Pupils: Pupils are equal, round, and reactive to light  Cardiovascular:      Rate and Rhythm: Normal rate  Rhythm irregular  Pulses: Normal pulses  Heart sounds: Normal heart sounds  No murmur heard  Pulmonary:      Effort: Pulmonary effort is normal  No respiratory distress  Breath sounds: Normal breath sounds  Abdominal:      General: Bowel sounds are normal  There is no distension  Palpations: Abdomen is soft   There is no mass  Tenderness: There is no abdominal tenderness  Musculoskeletal:         General: No swelling or tenderness  Normal range of motion  Cervical back: Normal range of motion and neck supple  No muscular tenderness  Skin:     General: Skin is warm and dry  Capillary Refill: Capillary refill takes less than 2 seconds  Findings: No erythema or rash  Neurological:      General: No focal deficit present  Mental Status: She is alert and oriented to person, place, and time  Mental status is at baseline  Psychiatric:         Mood and Affect: Mood normal          Behavior: Behavior normal          Thought Content: Thought content normal          Judgment: Judgment normal          Discussion with Family: baron daughter    Discharge instructions/Information to patient and family:   See after visit summary for information provided to patient and family  Provisions for Follow-Up Care:  See after visit summary for information related to follow-up care and any pertinent home health orders  Disposition:     Home with VNA Services (Reminder: Complete face to face encounter)      Planned Readmission:    No     Discharge Statement:  I spent 37 minutes discharging the patient  This time was spent on the day of discharge  I had direct contact with the patient on the day of discharge  Greater than 50% of the total time was spent examining patient, answering all patient questions, arranging and discussing plan of care with patient as well as directly providing post-discharge instructions  Additional time then spent on discharge activities  Discharge Medications:  See after visit summary for reconciled discharge medications provided to patient and family        ** Please Note: This note has been constructed using a voice recognition system **

## 2021-08-21 ENCOUNTER — TELEPHONE (OUTPATIENT)
Dept: FAMILY MEDICINE CLINIC | Facility: CLINIC | Age: 41
End: 2021-08-21

## 2021-08-21 DIAGNOSIS — K80.44 CALCULUS OF BILE DUCT WITH CHRONIC CHOLECYSTITIS WITHOUT OBSTRUCTION: Primary | ICD-10-CM

## 2021-08-21 NOTE — TELEPHONE ENCOUNTER
Patient indicated that has been taking the ursodiol for the gall stones and Dr Constantino Tai was supposed to order and ultrasound of his gall bladder after 6 months. No orders in system. Please advise.      3/27/2021 office notes:    Calculus of bile duct with ch

## 2021-09-04 ENCOUNTER — LAB ENCOUNTER (OUTPATIENT)
Dept: LAB | Age: 41
End: 2021-09-04
Attending: FAMILY MEDICINE
Payer: COMMERCIAL

## 2021-09-04 DIAGNOSIS — Z00.00 ROUTINE GENERAL MEDICAL EXAMINATION AT A HEALTH CARE FACILITY: ICD-10-CM

## 2021-09-04 DIAGNOSIS — E78.5 HYPERLIPIDEMIA, UNSPECIFIED HYPERLIPIDEMIA TYPE: ICD-10-CM

## 2021-09-04 DIAGNOSIS — E78.2 MIXED HYPERLIPIDEMIA: ICD-10-CM

## 2021-09-04 LAB
ALBUMIN SERPL-MCNC: 3.9 G/DL (ref 3.4–5)
ALBUMIN/GLOB SERPL: 1.2 {RATIO} (ref 1–2)
ALP LIVER SERPL-CCNC: 71 U/L
ALT SERPL-CCNC: 40 U/L
ANION GAP SERPL CALC-SCNC: 3 MMOL/L (ref 0–18)
AST SERPL-CCNC: 18 U/L (ref 15–37)
BILIRUB SERPL-MCNC: 0.8 MG/DL (ref 0.1–2)
BUN BLD-MCNC: 17 MG/DL (ref 7–18)
BUN/CREAT SERPL: 21.8 (ref 10–20)
CALCIUM BLD-MCNC: 9 MG/DL (ref 8.5–10.1)
CHLORIDE SERPL-SCNC: 105 MMOL/L (ref 98–112)
CHOLEST SMN-MCNC: 196 MG/DL (ref ?–200)
CO2 SERPL-SCNC: 31 MMOL/L (ref 21–32)
CREAT BLD-MCNC: 0.78 MG/DL
GLOBULIN PLAS-MCNC: 3.3 G/DL (ref 2.8–4.4)
GLUCOSE BLD-MCNC: 87 MG/DL (ref 70–99)
HDLC SERPL-MCNC: 33 MG/DL (ref 40–59)
LDLC SERPL CALC-MCNC: 113 MG/DL (ref ?–100)
M PROTEIN MFR SERPL ELPH: 7.2 G/DL (ref 6.4–8.2)
NONHDLC SERPL-MCNC: 163 MG/DL (ref ?–130)
OSMOLALITY SERPL CALC.SUM OF ELEC: 289 MOSM/KG (ref 275–295)
PATIENT FASTING Y/N/NP: YES
PATIENT FASTING Y/N/NP: YES
POTASSIUM SERPL-SCNC: 4.2 MMOL/L (ref 3.5–5.1)
SODIUM SERPL-SCNC: 139 MMOL/L (ref 136–145)
TRIGL SERPL-MCNC: 285 MG/DL (ref 30–149)
VIT D+METAB SERPL-MCNC: 20.3 NG/ML (ref 30–100)
VLDLC SERPL CALC-MCNC: 50 MG/DL (ref 0–30)

## 2021-09-04 PROCEDURE — 36415 COLL VENOUS BLD VENIPUNCTURE: CPT

## 2021-09-04 PROCEDURE — 80053 COMPREHEN METABOLIC PANEL: CPT

## 2021-09-04 PROCEDURE — 82306 VITAMIN D 25 HYDROXY: CPT

## 2021-09-04 PROCEDURE — 80061 LIPID PANEL: CPT

## 2021-09-09 RX ORDER — URSODIOL 500 MG/1
500 TABLET, FILM COATED ORAL 2 TIMES DAILY
Qty: 60 TABLET | Refills: 5 | Status: SHIPPED | OUTPATIENT
Start: 2021-09-09 | End: 2021-09-26

## 2021-09-09 RX ORDER — FLUTICASONE PROPIONATE AND SALMETEROL 250; 50 UG/1; UG/1
1 POWDER RESPIRATORY (INHALATION) EVERY 12 HOURS SCHEDULED
Qty: 1 EACH | Refills: 5 | Status: SHIPPED | OUTPATIENT
Start: 2021-09-09 | End: 2022-07-03

## 2021-09-10 ENCOUNTER — PATIENT MESSAGE (OUTPATIENT)
Dept: FAMILY MEDICINE CLINIC | Facility: CLINIC | Age: 41
End: 2021-09-10

## 2021-09-10 ENCOUNTER — TELEPHONE (OUTPATIENT)
Dept: FAMILY MEDICINE CLINIC | Facility: CLINIC | Age: 41
End: 2021-09-10

## 2021-09-10 DIAGNOSIS — L30.9 DERMATITIS: Primary | ICD-10-CM

## 2021-09-10 NOTE — TELEPHONE ENCOUNTER
Patient attached a photo to the [facility] message, see it under the Media tab    ----- Message from Elva Villa sent at 9/10/2021 11:19 AM CDT -----  Regarding: Other  Contact: [phone]  Can you tell doctor that my finger isn't getting better i

## 2021-09-15 NOTE — TELEPHONE ENCOUNTER
Left message to call back, transfer to triage, 3rd attempt. [employer] message sent to pt with doctor's instructions.

## 2021-09-16 RX ORDER — ALBUTEROL SULFATE 90 UG/1
2 AEROSOL, METERED RESPIRATORY (INHALATION) EVERY 4 HOURS PRN
Qty: 8.5 G | Refills: 11 | Status: SHIPPED | OUTPATIENT
Start: 2021-09-16 | End: 2022-01-17

## 2021-09-20 ENCOUNTER — NURSE TRIAGE (OUTPATIENT)
Dept: FAMILY MEDICINE CLINIC | Facility: CLINIC | Age: 41
End: 2021-09-20

## 2021-09-20 NOTE — TELEPHONE ENCOUNTER
----- Message from Radha Villa sent at 9/20/2021 12:16 PM CDT -----  Regarding: Medical question   I been feeling tired and body is achy, last couple days and I have pressure on my face, I am coughing of phlegm no color , no fever,

## 2021-09-20 NOTE — TELEPHONE ENCOUNTER
Action Requested: Summary for Provider     []  Critical Lab, Recommendations Needed  [] Need Additional Advice  []   FYI    []   Need Orders  [] Need Medications Sent to Pharmacy  []  Other     SUMMARY: patient c/o 2 day onset, fatigue, headaches, body ach

## 2021-09-22 ENCOUNTER — TELEPHONE (OUTPATIENT)
Dept: FAMILY MEDICINE CLINIC | Facility: CLINIC | Age: 41
End: 2021-09-22

## 2021-09-22 RX ORDER — PRAVASTATIN SODIUM 10 MG
10 TABLET ORAL NIGHTLY
Qty: 90 TABLET | Refills: 1 | Status: SHIPPED | OUTPATIENT
Start: 2021-09-22 | End: 2022-04-13

## 2021-09-22 NOTE — TELEPHONE ENCOUNTER
Patient Review of Clinical Information             Medications    The patient or proxy has not reviewed this information, and there are updates pending:     Requested Medication Removals Start Date Reported By  Comments    triamcinolone acetonide 0

## 2021-09-26 ENCOUNTER — HOSPITAL ENCOUNTER (OUTPATIENT)
Dept: ULTRASOUND IMAGING | Age: 41
Discharge: HOME | End: 2021-09-26
Attending: FAMILY MEDICINE
Payer: COMMERCIAL

## 2021-09-26 DIAGNOSIS — K80.44 CALCULUS OF BILE DUCT WITH CHRONIC CHOLECYSTITIS WITHOUT OBSTRUCTION: ICD-10-CM

## 2021-09-26 PROCEDURE — 76705 ECHO EXAM OF ABDOMEN: CPT | Performed by: FAMILY MEDICINE

## 2021-09-28 RX ORDER — URSODIOL 500 MG/1
500 TABLET, FILM COATED ORAL 2 TIMES DAILY
Qty: 60 TABLET | Refills: 5 | Status: SHIPPED | OUTPATIENT
Start: 2021-09-28

## 2021-09-28 NOTE — TELEPHONE ENCOUNTER
Please review. Protocol failed / No protocol. Requested Prescriptions   Pending Prescriptions Disp Refills    HYDROcodone-acetaminophen (NORCO)  MG Oral Tab 20 tablet 0     Sig: Take 1 tablet by mouth every 6 (six) hours as needed for Pain.

## 2021-10-01 RX ORDER — URSODIOL 500 MG/1
500 TABLET, FILM COATED ORAL 2 TIMES DAILY
Qty: 60 TABLET | Refills: 5 | Status: SHIPPED | OUTPATIENT
Start: 2021-10-01

## 2021-10-01 RX ORDER — HYDROCODONE BITARTRATE AND ACETAMINOPHEN 10; 325 MG/1; MG/1
1 TABLET ORAL EVERY 6 HOURS PRN
Qty: 20 TABLET | Refills: 0 | Status: SHIPPED | OUTPATIENT
Start: 2021-10-01 | End: 2021-12-21

## 2021-10-18 ENCOUNTER — PATIENT MESSAGE (OUTPATIENT)
Dept: FAMILY MEDICINE CLINIC | Facility: CLINIC | Age: 41
End: 2021-10-18

## 2021-10-18 ENCOUNTER — TELEPHONE (OUTPATIENT)
Dept: FAMILY MEDICINE CLINIC | Facility: CLINIC | Age: 41
End: 2021-10-18

## 2021-10-18 NOTE — TELEPHONE ENCOUNTER
See MyChart for the  attached picture .     ----- Message from Chelle Villa sent at 10/18/2021 11:07 AM CDT -----  Regarding: Finger  Can you please tell doctor paula to prescribe a cream for my finger , the referral to the dermatologist wasn’t

## 2021-10-29 ENCOUNTER — PATIENT MESSAGE (OUTPATIENT)
Dept: FAMILY MEDICINE CLINIC | Facility: CLINIC | Age: 41
End: 2021-10-29

## 2021-10-29 NOTE — TELEPHONE ENCOUNTER
From: Trudy Murray  To: Yony Chan DO  Sent: 10/29/2021 12:31 AM CDT  Subject: Medicine     I tried Paxil before by [facility] but that was a long time ago and it didn’t work and I never talked about it again but I always had the sexua

## 2021-10-29 NOTE — TELEPHONE ENCOUNTER
This is the second message pt sent regarding medication questions. Pt was sent a [facility] message advising him to schedule an appt with Dr. Zander Sabillon to discuss his concerns. Gurjit Baeza

## 2021-12-24 RX ORDER — HYDROCODONE BITARTRATE AND ACETAMINOPHEN 10; 325 MG/1; MG/1
1 TABLET ORAL EVERY 6 HOURS PRN
Qty: 30 TABLET | Refills: 0 | Status: SHIPPED | OUTPATIENT
Start: 2021-12-24 | End: 2022-05-24

## 2022-01-14 ENCOUNTER — TELEPHONE (OUTPATIENT)
Dept: FAMILY MEDICINE CLINIC | Facility: CLINIC | Age: 42
End: 2022-01-14

## 2022-01-14 ENCOUNTER — PATIENT MESSAGE (OUTPATIENT)
Dept: FAMILY MEDICINE CLINIC | Facility: CLINIC | Age: 42
End: 2022-01-14

## 2022-01-14 NOTE — TELEPHONE ENCOUNTER
[employer] SUPPORT=please assists with PA,thanks.        From: Hardy Ortiz  To: Artem Spence DO  Sent: 1/14/2022  8:51 AM CST  Subject: Magdaleno Stearns     I need a prior authorization for the [place] pills insurance isn’t covering them according to pharmacy

## 2022-01-14 NOTE — TELEPHONE ENCOUNTER
Patient states for two days now having facial pressure, headache, congestion, cough and yellow phlegm. Yesterday temperature was 99.8, but denies fever today. Patient denies shortness of breath, chest pain, sore throat, nausea, vomiting, diarrhea.     P

## 2022-01-14 NOTE — TELEPHONE ENCOUNTER
See acute encounter 1/14/22. From: Sang Holly  To: Lena Chan,   Sent: 1/14/2022  8:50 AM CST  Subject: Magi Brown     Last couple days I been having a bad sinus infection with coughing up phlegm yellow in color, chest hurts from coug

## 2022-01-14 NOTE — TELEPHONE ENCOUNTER
----- Message from Юлия Avila RN sent at 1/14/2022 11:13 AM CST -----  Regarding: FW: Magi Brown       ----- Message -----  From: Sang Holly  Sent: 1/14/2022   8:50 AM CST  To: [facility] Triage  Subject: Magi Brown

## 2022-01-14 NOTE — TELEPHONE ENCOUNTER
Left message for patient to return phone call, see below  Please schedule a virtual visit as requested by Dr Glenn Hays

## 2022-01-15 NOTE — TELEPHONE ENCOUNTER
Approved for HYDROCODONE-ACETAMINOPHEN Tablet, quantity up to 30 per 30 days, under the pharmacy benefit. The drug has been approved from 01/14/2022 to 04/14/2022. Generic substitution required when available.

## 2022-01-17 RX ORDER — ALBUTEROL SULFATE 90 UG/1
AEROSOL, METERED RESPIRATORY (INHALATION)
Qty: 8.5 G | Refills: 11 | Status: SHIPPED | OUTPATIENT
Start: 2022-01-17

## 2022-01-21 ENCOUNTER — TELEMEDICINE (OUTPATIENT)
Dept: FAMILY MEDICINE CLINIC | Facility: CLINIC | Age: 42
End: 2022-01-21

## 2022-01-21 DIAGNOSIS — J01.90 ACUTE NON-RECURRENT SINUSITIS, UNSPECIFIED LOCATION: Primary | ICD-10-CM

## 2022-01-21 PROCEDURE — 99213 OFFICE O/P EST LOW 20 MIN: CPT | Performed by: FAMILY MEDICINE

## 2022-01-21 RX ORDER — AMOXICILLIN 500 MG/1
500 CAPSULE ORAL 3 TIMES DAILY
Qty: 30 CAPSULE | Refills: 0 | Status: SHIPPED | OUTPATIENT
Start: 2022-01-21 | End: 2022-01-31

## 2022-01-21 NOTE — PROGRESS NOTES
Video visit    1 week history of temperature 99 sinus pressure and body aches. Coughing up yellow phlegm and has yellow nasal congestion. Denies dyspnea at this time has not used any over-the-counter medications.     Objective patient is comfortable no appa

## 2022-01-31 ENCOUNTER — LAB ENCOUNTER (OUTPATIENT)
Dept: LAB | Age: 42
End: 2022-01-31
Attending: FAMILY MEDICINE
Payer: COMMERCIAL

## 2022-01-31 ENCOUNTER — PATIENT MESSAGE (OUTPATIENT)
Dept: FAMILY MEDICINE CLINIC | Facility: CLINIC | Age: 42
End: 2022-01-31

## 2022-01-31 DIAGNOSIS — N52.9 ERECTILE DYSFUNCTION, UNSPECIFIED ERECTILE DYSFUNCTION TYPE: ICD-10-CM

## 2022-01-31 PROCEDURE — 84402 ASSAY OF FREE TESTOSTERONE: CPT

## 2022-01-31 PROCEDURE — 36415 COLL VENOUS BLD VENIPUNCTURE: CPT

## 2022-01-31 PROCEDURE — 84403 ASSAY OF TOTAL TESTOSTERONE: CPT

## 2022-02-01 NOTE — TELEPHONE ENCOUNTER
From: Felipe Joseph  To: Eligio Gifford DO  Sent: 1/31/2022 5:55 PM CST  Subject: Medicine    Can you ask doctor if he can give me a refill for vitamin d3 I used to take one 50,000 mcg a week

## 2022-02-08 LAB
TESTOSTERONE, FREE, S: 10.5 NG/DL
TESTOSTERONE, TOTAL, S: 318 NG/DL

## 2022-02-10 ENCOUNTER — TELEPHONE (OUTPATIENT)
Dept: FAMILY MEDICINE CLINIC | Facility: CLINIC | Age: 42
End: 2022-02-10

## 2022-02-10 RX ORDER — AMOXICILLIN AND CLAVULANATE POTASSIUM 875; 125 MG/1; MG/1
1 TABLET, FILM COATED ORAL 2 TIMES DAILY
Qty: 20 TABLET | Refills: 0 | Status: SHIPPED | OUTPATIENT
Start: 2022-02-10 | End: 2022-02-20

## 2022-02-13 ENCOUNTER — TELEPHONE (OUTPATIENT)
Dept: FAMILY MEDICINE CLINIC | Facility: CLINIC | Age: 42
End: 2022-02-13

## 2022-02-13 RX ORDER — ALBUTEROL SULFATE 90 UG/1
AEROSOL, METERED RESPIRATORY (INHALATION)
Qty: 1 EACH | Refills: 0 | Status: SHIPPED | OUTPATIENT
Start: 2022-02-13 | End: 2022-02-14

## 2022-02-13 NOTE — TELEPHONE ENCOUNTER
On-call note: Patient states he is still not better and having quite a bit of postnasal drip and sinus discomfort. He did not know that the Augmentin was called in. He states [facility] did not call him. I let them know that it was actually sent to [facility]. He will pick it up tomorrow as [facility] is closed. He states that because of all the postnasal drip this has exacerbated his asthma. He has been using more albuterol. He cannot  a new prescription for 6 more days so he would like a different inhaler such as ProAir sent to [facility] so he could pick that up today as they will not fill the albuterol as it is too early. Chaim Cunningham.       Gerri Velásquez

## 2022-02-14 ENCOUNTER — PATIENT MESSAGE (OUTPATIENT)
Dept: FAMILY MEDICINE CLINIC | Facility: CLINIC | Age: 42
End: 2022-02-14

## 2022-02-14 RX ORDER — ALBUTEROL SULFATE 90 UG/1
AEROSOL, METERED RESPIRATORY (INHALATION)
Qty: 1 EACH | Refills: 0 | Status: SHIPPED | OUTPATIENT
Start: 2022-02-14

## 2022-02-14 NOTE — TELEPHONE ENCOUNTER
Patient was calling because he can't refill his inhaler for another 6 days. Does not have wheezing but has a lot of PND going into his throat and chest. Patient reported he already did a covid test and did home covid test and was neg. Patient was asking for new script for albuterol inhaler with change in directions so he can refill since he's been taking more often. Left vm for patient to call back to confirm dates of his covid tests and to schedule appt noted below, [employer] msg also sent. ( Encounter seen after call ended with patient.)

## 2022-02-14 NOTE — TELEPHONE ENCOUNTER
Patient returning call to inquire about albuterol inhaler Rx. Per patient he explained to on-call MD that pharmacy could not refill if prescription was sent with the same sig as previous as it would be deemed refilled to soon. Patient indicates needs frequency of sig adjusted (increased) to ensure insurance coverage. Per patient has been using more often of late. Patient indicates last COVID test was an at home test yesterday which was negative. Previous negative test was at home test \"3 weeks ago\" with symptom onset. RN advises patient that provider recommends  COVID testing and in person visit if/when negative test.    Patient declines COVID test or inperson visit. Is requesting medication sig is adjusted per conversation with on-call provider. Patient insists that on-call provider agreed to adjust sig. Routing to PCP and Dr. Nasir Kenney to advise. Patient is requesting Rx sent to [facility] in chart.

## 2022-02-14 NOTE — TELEPHONE ENCOUNTER
Patient to get COVID test as ordered. Also patient to go to ER if SOB. Patient to schedule in person appt to be seen.

## 2022-02-14 NOTE — TELEPHONE ENCOUNTER
From: Elsa Jacob  To: Lolly Chan DO  Sent: 2/14/2022 9:47 AM CST  Subject: Covid test    I did a covid test last night at home it was negative

## 2022-02-28 ENCOUNTER — TELEMEDICINE (OUTPATIENT)
Dept: FAMILY MEDICINE CLINIC | Facility: CLINIC | Age: 42
End: 2022-02-28

## 2022-02-28 ENCOUNTER — TELEPHONE (OUTPATIENT)
Dept: FAMILY MEDICINE CLINIC | Facility: CLINIC | Age: 42
End: 2022-02-28

## 2022-02-28 ENCOUNTER — NURSE ONLY (OUTPATIENT)
Dept: LAB | Age: 42
End: 2022-02-28
Attending: FAMILY MEDICINE
Payer: COMMERCIAL

## 2022-02-28 DIAGNOSIS — J01.00 ACUTE MAXILLARY SINUSITIS, RECURRENCE NOT SPECIFIED: Primary | ICD-10-CM

## 2022-02-28 DIAGNOSIS — J01.00 ACUTE MAXILLARY SINUSITIS, RECURRENCE NOT SPECIFIED: ICD-10-CM

## 2022-02-28 PROCEDURE — 99213 OFFICE O/P EST LOW 20 MIN: CPT | Performed by: FAMILY MEDICINE

## 2022-02-28 RX ORDER — METHYLPREDNISOLONE 4 MG/1
TABLET ORAL
Qty: 1 EACH | Refills: 0 | Status: SHIPPED | OUTPATIENT
Start: 2022-02-28

## 2022-02-28 RX ORDER — CLINDAMYCIN HYDROCHLORIDE 300 MG/1
300 CAPSULE ORAL 3 TIMES DAILY
Qty: 30 CAPSULE | Refills: 0 | Status: SHIPPED | OUTPATIENT
Start: 2022-02-28

## 2022-02-28 NOTE — TELEPHONE ENCOUNTER
Stu Deras,    Would like to have results of COVID  Test before return to work letter is sent?     COVID  Test done today and results are in process     Thank you

## 2022-02-28 NOTE — TELEPHONE ENCOUNTER
Patient calling, confirmed name and . Requesting that Dr. Annalise Persaud provide a work note excusing him from work with return on Wednesday 3/2.

## 2022-03-01 NOTE — TELEPHONE ENCOUNTER
OK.  If results negative and I am out of office tomorrow may go ahead with letter to return to work.

## 2022-03-01 NOTE — TELEPHONE ENCOUNTER
COVID  Results are still pending    Staff-  Please follow up tomorrow , note DR. Guerrero's  message

## 2022-03-02 ENCOUNTER — PATIENT MESSAGE (OUTPATIENT)
Dept: FAMILY MEDICINE CLINIC | Facility: CLINIC | Age: 42
End: 2022-03-02

## 2022-03-02 NOTE — TELEPHONE ENCOUNTER
Pt was called and informed that his covid test was negative. Letter sent through [employer] he can return to work.

## 2022-03-02 NOTE — TELEPHONE ENCOUNTER
Letter has been sent.        ----- Message -----  From: Jesusita Pardo  Sent: 3/2/2022   8:11 AM CST  To: Em Rn Triage  Subject: Note                                             I need a return to work note I am returning on Monday march 7 , I was off 02/28-03/2 due to my illness .

## 2022-03-25 RX ORDER — SILDENAFIL 100 MG/1
100 TABLET, FILM COATED ORAL
Qty: 12 TABLET | Refills: 1 | Status: SHIPPED | OUTPATIENT
Start: 2022-03-25

## 2022-03-25 RX ORDER — HYDROCODONE BITARTRATE AND ACETAMINOPHEN 10; 325 MG/1; MG/1
1 TABLET ORAL EVERY 6 HOURS PRN
Qty: 30 TABLET | Refills: 0 | OUTPATIENT
Start: 2022-03-25

## 2022-03-25 RX ORDER — SILDENAFIL 50 MG/1
50 TABLET, FILM COATED ORAL
Qty: 12 TABLET | Refills: 1 | Status: SHIPPED | OUTPATIENT
Start: 2022-03-25

## 2022-03-25 NOTE — TELEPHONE ENCOUNTER
Please review refill protocol failed/ no protocol  Requested Prescriptions   Pending Prescriptions Disp Refills    HYDROcodone-acetaminophen (NORCO)  MG Oral Tab 30 tablet 0     Sig: Take 1 tablet by mouth every 6 (six) hours as needed for Pain.         There is no refill protocol information for this order

## 2022-03-25 NOTE — TELEPHONE ENCOUNTER
DR Chan=see patient's message requesting to increase it to 100 mg daily, unable to approve the medication,pended  2 dosages  for your approval, just in case you agree to the 100 mg dose,please choose before signing ,thanks. Had telemedicine   visit on 2/28/22. No future appointments. Approved 90 day supply per triage protocol, will need to schedule follow up appointment in order to get further refills. Refill passed per [facility] protocol. Requested Prescriptions   Pending Prescriptions Disp Refills    Sildenafil Citrate (VIAGRA) 50 MG Oral Tab 12 tablet 1     Sig: Take 1 tablet (50 mg total) by mouth daily as needed for Erectile Dysfunction.         Genitourinary Medications Failed - 3/25/2022 10:22 AM        Failed - Appointment in the past 12 or next 3 months        Passed - Patient does not have pulmonary hypertension on problem list                    Recent Outpatient Visits              3 weeks ago Acute maxillary sinusitis, recurrence not specified    [address]    Nurse Only    3 weeks ago Acute maxillary sinusitis, recurrence not specified    [facility], Lynn GARCIA, [place],     Telemedicine    2 months ago Acute non-recurrent sinusitis, unspecified location    [facility] Ever Soto DO    Telemedicine    12 months ago Routine general medical examination at a health care facility    [facility], [place] GARCIA, [place], [address] Visit    1 year ago Nasal polyps    [facility] for Brian White MD    Office Visit

## 2022-04-09 RX ORDER — HYDROCODONE BITARTRATE AND ACETAMINOPHEN 10; 325 MG/1; MG/1
1 TABLET ORAL EVERY 6 HOURS PRN
Qty: 30 TABLET | Refills: 0 | OUTPATIENT
Start: 2022-04-09

## 2022-04-11 ENCOUNTER — TELEMEDICINE (OUTPATIENT)
Dept: FAMILY MEDICINE CLINIC | Facility: CLINIC | Age: 42
End: 2022-04-11

## 2022-04-11 ENCOUNTER — TELEPHONE (OUTPATIENT)
Dept: FAMILY MEDICINE CLINIC | Facility: CLINIC | Age: 42
End: 2022-04-11

## 2022-04-11 ENCOUNTER — PATIENT MESSAGE (OUTPATIENT)
Dept: FAMILY MEDICINE CLINIC | Facility: CLINIC | Age: 42
End: 2022-04-11

## 2022-04-11 DIAGNOSIS — R05.9 COUGH: ICD-10-CM

## 2022-04-11 DIAGNOSIS — J32.9 SINUSITIS, UNSPECIFIED CHRONICITY, UNSPECIFIED LOCATION: Primary | ICD-10-CM

## 2022-04-11 PROCEDURE — 99213 OFFICE O/P EST LOW 20 MIN: CPT | Performed by: FAMILY MEDICINE

## 2022-04-11 RX ORDER — CLINDAMYCIN HYDROCHLORIDE 300 MG/1
300 CAPSULE ORAL 3 TIMES DAILY
Qty: 21 CAPSULE | Refills: 0 | Status: SHIPPED | OUTPATIENT
Start: 2022-04-11 | End: 2022-04-18

## 2022-04-11 NOTE — TELEPHONE ENCOUNTER
On-call note: Pal Pettit this is his fourth antibiotic this year. I think he should go back and see ENT and perhaps an allergist.  I warned him about C. difficile. He sounds terribly congested in his nose and is totally mouth breathing.     Ranjeet Martinez

## 2022-04-13 DIAGNOSIS — R09.81 NASAL CONGESTION: ICD-10-CM

## 2022-04-13 DIAGNOSIS — J34.3 HYPERTROPHY OF NASAL TURBINATES: ICD-10-CM

## 2022-04-13 RX ORDER — LOSARTAN POTASSIUM 50 MG/1
50 TABLET ORAL DAILY
Qty: 90 TABLET | Refills: 1 | Status: SHIPPED | OUTPATIENT
Start: 2022-04-13

## 2022-04-13 RX ORDER — ALBUTEROL SULFATE 90 UG/1
2 AEROSOL, METERED RESPIRATORY (INHALATION) EVERY 4 HOURS PRN
Qty: 8.5 G | Refills: 0 | OUTPATIENT
Start: 2022-04-13

## 2022-04-13 RX ORDER — MONTELUKAST SODIUM 10 MG/1
10 TABLET ORAL NIGHTLY
Qty: 90 TABLET | Refills: 1 | Status: SHIPPED | OUTPATIENT
Start: 2022-04-13

## 2022-04-13 RX ORDER — ALBUTEROL SULFATE 90 UG/1
AEROSOL, METERED RESPIRATORY (INHALATION)
Qty: 1 EACH | Refills: 0 | Status: SHIPPED | OUTPATIENT
Start: 2022-04-13 | End: 2022-05-16

## 2022-04-13 RX ORDER — PRAVASTATIN SODIUM 10 MG
10 TABLET ORAL NIGHTLY
Qty: 90 TABLET | Refills: 0 | Status: SHIPPED | OUTPATIENT
Start: 2022-04-13 | End: 2022-04-18

## 2022-04-13 RX ORDER — LEVOCETIRIZINE DIHYDROCHLORIDE 5 MG/1
5 TABLET, FILM COATED ORAL EVERY EVENING
Qty: 90 TABLET | Refills: 3 | OUTPATIENT
Start: 2022-04-13

## 2022-04-13 RX ORDER — HYDROCODONE BITARTRATE AND ACETAMINOPHEN 10; 325 MG/1; MG/1
1 TABLET ORAL EVERY 6 HOURS PRN
Qty: 30 TABLET | Refills: 0 | OUTPATIENT
Start: 2022-04-13

## 2022-04-13 RX ORDER — FLUTICASONE PROPIONATE 50 MCG
2 SPRAY, SUSPENSION (ML) NASAL DAILY
Refills: 0 | Status: CANCELLED | OUTPATIENT
Start: 2022-04-13

## 2022-04-13 NOTE — TELEPHONE ENCOUNTER
Please review; protocol failed. Aigou message sent to patient to make appointment for Norco refill. Requested Prescriptions   Pending Prescriptions Disp Refills    Ursodiol 500 MG Oral Tab 60 tablet 5     Sig: Take 1 tablet (500 mg total) by mouth 2 (two) times daily. There is no refill protocol information for this order        HYDROcodone-acetaminophen (NORCO)  MG Oral Tab 30 tablet 0     Sig: Take 1 tablet by mouth every 6 (six) hours as needed for Pain. There is no refill protocol information for this order       Signed Prescriptions Disp Refills    montelukast 10 MG Oral Tab 90 tablet 1     Sig: Take 1 tablet (10 mg total) by mouth nightly. Asthma & COPD Medication Protocol Failed - 4/13/2022  4:25 AM        Failed - Appointment in past 6 or next 3 months           losartan 50 MG Oral Tab 90 tablet 1     Sig: Take 1 tablet (50 mg total) by mouth daily.         Hypertensive Medications Protocol Failed - 4/13/2022  4:25 AM        Failed - Appointment in past 6 or next 3 months        Passed - CMP or BMP in past 12 months        Passed - GFR Non- > 50     Lab Results   Component Value Date    GFRNAA 113 09/04/2021                           Recent Outpatient Visits              2 days ago Sinusitis, unspecified chronicity, unspecified location    [facility], [address], [facility], DO    Telemedicine    1 month ago Acute maxillary sinusitis, recurrence not specified    [address]    Nurse Only    1 month ago Acute maxillary sinusitis, recurrence not specified    [facility], [address] [place] [address],     Telemedicine    2 months ago Acute non-recurrent sinusitis, unspecified location    [facility] Chantel Colbert, DO    Telemedicine    1 year ago Routine general medical examination at a health care facility    [facility], [address] [place] [address], Jose Luis Shanks, DO    Office Visit

## 2022-04-13 NOTE — TELEPHONE ENCOUNTER
Refill passed per [facility] protocol. Patient had telemedicine appointment on 04/11/22. Requested Prescriptions   Pending Prescriptions Disp Refills    montelukast 10 MG Oral Tab 90 tablet 3     Sig: Take 1 tablet (10 mg total) by mouth nightly. Asthma & COPD Medication Protocol Failed - 4/13/2022  4:25 AM        Failed - Appointment in past 6 or next 3 months           losartan 50 MG Oral Tab 90 tablet 3     Sig: Take 1 tablet (50 mg total) by mouth daily. Hypertensive Medications Protocol Failed - 4/13/2022  4:25 AM        Failed - Appointment in past 6 or next 3 months        Passed - CMP or BMP in past 12 months        Passed - GFR Non- > 50     Lab Results   Component Value Date    GFRNAA 113 09/04/2021                    Ursodiol 500 MG Oral Tab 60 tablet 5     Sig: Take 1 tablet (500 mg total) by mouth 2 (two) times daily. There is no refill protocol information for this order        HYDROcodone-acetaminophen (NORCO)  MG Oral Tab 30 tablet 0     Sig: Take 1 tablet by mouth every 6 (six) hours as needed for Pain.         There is no refill protocol information for this order               Recent Outpatient Visits              2 days ago Sinusitis, unspecified chronicity, unspecified location    [facility], [address], [facility]    Telemedicine    1 month ago Acute maxillary sinusitis, recurrence not specified    [address]    Nurse Only    1 month ago Acute maxillary sinusitis, recurrence not specified    [facility], [address] [place] [address],     Telemedicine    2 months ago Acute non-recurrent sinusitis, unspecified location    [facility], [address],     Telemedicine    1 year ago Routine general medical examination at a health care facility    [facility], [address] [facility], [address] [place] [address] Visit

## 2022-04-13 NOTE — TELEPHONE ENCOUNTER
To reception staff, pls call pt for appt. Also Fevert message sent to pt   TY      Unable to refill pravastatin per protocol. pls advise, thanks. Refill passed per [facility] protocol   Requested Prescriptions   Pending Prescriptions Disp Refills    pravastatin 10 MG Oral Tab 90 tablet 1     Sig: Take 1 tablet (10 mg total) by mouth nightly. Cholesterol Medication Protocol Failed - 2022  4:25 AM        Failed - Appointment in past 12 or next 3 months        Passed - ALT in past 12 months        Passed - LDL in past 12 months        Passed - Last ALT < 80       Lab Results   Component Value Date    ALT 40 2021             Passed - Last LDL < 130     Lab Results   Component Value Date     (H) 2021                  albuterol 108 (90 Base) MCG/ACT Inhalation Aero Soln 8.5 g 11     Sig: Inhale 2 puffs into the lungs every 4 (four) hours as needed for Wheezing.         Asthma & COPD Medication Protocol Failed - 2022  4:25 AM        Failed - Appointment in past 6 or next 3 months           albuterol (PROAIR HFA) 108 (90 Base) MCG/ACT Inhalation Aero Soln 1 each 0     Si puffs every 4 hours as needed        Asthma & COPD Medication Protocol Failed - 2022  4:25 AM        Failed - Appointment in past 6 or next 3 months

## 2022-04-14 RX ORDER — HYDROCODONE BITARTRATE AND ACETAMINOPHEN 10; 325 MG/1; MG/1
1 TABLET ORAL EVERY 6 HOURS PRN
Qty: 30 TABLET | Refills: 0 | OUTPATIENT
Start: 2022-04-14

## 2022-04-14 RX ORDER — URSODIOL 500 MG/1
500 TABLET, FILM COATED ORAL 2 TIMES DAILY
Qty: 60 TABLET | Refills: 5 | Status: SHIPPED | OUTPATIENT
Start: 2022-04-14

## 2022-04-18 RX ORDER — PRAVASTATIN SODIUM 10 MG
TABLET ORAL
Qty: 90 TABLET | Refills: 0 | Status: SHIPPED | OUTPATIENT
Start: 2022-04-18

## 2022-04-18 RX ORDER — HYDROCODONE BITARTRATE AND ACETAMINOPHEN 10; 325 MG/1; MG/1
1 TABLET ORAL EVERY 6 HOURS PRN
Qty: 30 TABLET | Refills: 0 | OUTPATIENT
Start: 2022-04-18

## 2022-05-12 ENCOUNTER — PATIENT MESSAGE (OUTPATIENT)
Dept: FAMILY MEDICINE CLINIC | Facility: CLINIC | Age: 42
End: 2022-05-12

## 2022-05-12 ENCOUNTER — TELEPHONE (OUTPATIENT)
Dept: FAMILY MEDICINE CLINIC | Facility: CLINIC | Age: 42
End: 2022-05-12

## 2022-05-13 RX ORDER — LEVOCETIRIZINE DIHYDROCHLORIDE 5 MG/1
TABLET, FILM COATED ORAL
Qty: 90 TABLET | Refills: 3 | Status: SHIPPED | OUTPATIENT
Start: 2022-05-13

## 2022-05-13 NOTE — TELEPHONE ENCOUNTER
Patient stated that has been having on and off cough, post nasal drip since January 2022. Has been on antibiotics in the past. Taking flonase, xyzal, and singulair but not really helping. Daughter is sick at home as well-can hear her coughing in the background. Denies any exposure to COVID. Has not been tested for COVID recently. Last 1 week has been feeling right sided pain in his chest when he takes a breath in, occasionally short of breath, no fevers. No other symptoms. Advised patient to go to urgent care or the ER [facility] for an evaluation. Patient agreed.

## 2022-05-13 NOTE — TELEPHONE ENCOUNTER
----- Message from Alina Franklin RN sent at 5/12/2022  4:50 PM CDT -----  Regarding: FW: Gracejudymarlene Brown       ----- Message -----  From: Sang Holly  Sent: 5/12/2022   2:59 PM CDT  To: Em Rn Triage  Subject: Genovevamarlene Stephanie                                          I am still having congested in my nose and my right side of chest hurts , when I breath , coughing lots of Phlegm up .  And coughing

## 2022-05-13 NOTE — TELEPHONE ENCOUNTER
This refill request is being sent to the provider for the following reason:  []Patient has not had an appointment within the past 12 months but has made an appointment on: ___  []Medication is not within protocol  [x]Patient did not complete follow up recommendations No appointment within past 12 months LOV:03/10/2021 No future appointments.   []Other: ___

## 2022-05-13 NOTE — TELEPHONE ENCOUNTER
From: Hardy Ortiz  To: Artem Spence DO  Sent: 5/12/2022 2:59 PM CDT  Subject: Magdaleno Stearns     I am still having congested in my nose and my right side of chest hurts , when I breath , coughing lots of Phlegm up .  And coughing

## 2022-05-13 NOTE — TELEPHONE ENCOUNTER
First Call attempt. Left Voicemail to call back our office. Office phone number provided with office hours.

## 2022-05-14 ENCOUNTER — TELEPHONE (OUTPATIENT)
Dept: FAMILY MEDICINE CLINIC | Facility: CLINIC | Age: 42
End: 2022-05-14

## 2022-05-14 NOTE — TELEPHONE ENCOUNTER
On-call note: Received a text that he is having congestion and coughing a lot of different colored phlegm's. He has not made an appointment to see someone in over a year. He continues to make telehealth visits or calls after hours for the on-call doctor. He states he is also congested and having dripping down the back of his throat and then coughing up some yellow phlegm. This has been going on for 1 week now so not sure why he did not make an appointment to be seen. He states he has taken his albuterol as well. He states this has been all going on since January of this year. I told him that Dr. Jaci Zuniga even did a referral for him to see the ENT doctor and the allergist on 4/12/2022. He states that the allergist is very difficult to get into for an appointment. I explained he should call his insurance and find out if there is a different allergist they will allow him to see and then call Dr. Jaci Zuniga for a referral.  At this time I told her he really needs to be seen and he needs to go to the immediate care and get evaluated as I do not feel comfortable giving him more antibiotics over the phone as I just did a telehealth visit for him in April. He states he may just wait till Monday to see Dr. Miranda Bhat or the ENT doctor to see if she has any availability. I let him know if he gets worse by then then he should definitely go to the immediate care instead. Jasmin Cox. Leopoldo    All congested and dripping and yellow cough. Last week now. Nose congestion. Take albuterol. Since 1/22.

## 2022-05-16 ENCOUNTER — OFFICE VISIT (OUTPATIENT)
Dept: OTOLARYNGOLOGY | Facility: CLINIC | Age: 42
End: 2022-05-16
Payer: COMMERCIAL

## 2022-05-16 VITALS — TEMPERATURE: 99 F

## 2022-05-16 DIAGNOSIS — Z91.09 ENVIRONMENTAL ALLERGIES: Primary | ICD-10-CM

## 2022-05-16 DIAGNOSIS — J33.9 NASAL POLYPS: ICD-10-CM

## 2022-05-16 PROCEDURE — 99213 OFFICE O/P EST LOW 20 MIN: CPT | Performed by: OTOLARYNGOLOGY

## 2022-05-16 RX ORDER — CIPROFLOXACIN 500 MG/1
500 TABLET, FILM COATED ORAL EVERY 12 HOURS
Qty: 28 TABLET | Refills: 0 | Status: SHIPPED | OUTPATIENT
Start: 2022-05-16 | End: 2022-05-30

## 2022-05-16 RX ORDER — PREDNISONE 20 MG/1
TABLET ORAL
Qty: 17 TABLET | Refills: 0 | Status: SHIPPED | OUTPATIENT
Start: 2022-05-16

## 2022-05-23 ENCOUNTER — OFFICE VISIT (OUTPATIENT)
Dept: ALLERGY | Facility: CLINIC | Age: 42
End: 2022-05-23
Payer: COMMERCIAL

## 2022-05-23 VITALS
OXYGEN SATURATION: 97 % | TEMPERATURE: 98 F | RESPIRATION RATE: 20 BRPM | HEART RATE: 76 BPM | WEIGHT: 238 LBS | BODY MASS INDEX: 37.35 KG/M2 | DIASTOLIC BLOOD PRESSURE: 83 MMHG | SYSTOLIC BLOOD PRESSURE: 142 MMHG | HEIGHT: 67 IN

## 2022-05-23 DIAGNOSIS — J32.2 CHRONIC ETHMOIDAL SINUSITIS: ICD-10-CM

## 2022-05-23 DIAGNOSIS — J33.9 NASAL POLYP: ICD-10-CM

## 2022-05-23 DIAGNOSIS — R09.81 NASAL CONGESTION: ICD-10-CM

## 2022-05-23 DIAGNOSIS — J30.89 SEASONAL AND PERENNIAL ALLERGIC RHINOCONJUNCTIVITIS: ICD-10-CM

## 2022-05-23 DIAGNOSIS — H10.10 SEASONAL AND PERENNIAL ALLERGIC RHINOCONJUNCTIVITIS: ICD-10-CM

## 2022-05-23 DIAGNOSIS — J45.40 MODERATE PERSISTENT EXTRINSIC ASTHMA WITHOUT COMPLICATION: Primary | ICD-10-CM

## 2022-05-23 DIAGNOSIS — J34.3 HYPERTROPHY OF NASAL TURBINATES: ICD-10-CM

## 2022-05-23 DIAGNOSIS — J30.2 SEASONAL AND PERENNIAL ALLERGIC RHINOCONJUNCTIVITIS: ICD-10-CM

## 2022-05-23 RX ORDER — LEVOCETIRIZINE DIHYDROCHLORIDE 5 MG/1
5 TABLET, FILM COATED ORAL EVERY EVENING
Qty: 90 TABLET | Refills: 3 | Status: SHIPPED | OUTPATIENT
Start: 2022-05-23

## 2022-05-23 RX ORDER — FLUTICASONE PROPIONATE 50 MCG
2 SPRAY, SUSPENSION (ML) NASAL DAILY
Qty: 3 EACH | Refills: 0 | Status: SHIPPED | OUTPATIENT
Start: 2022-05-23

## 2022-05-23 NOTE — PATIENT INSTRUCTIONS
#1 chronic sinusitis with nasal polyps  Prior sinus surgery and nasal polypectomy in 2020 with Dr. Fernandez Gave  Recent recurrence of symptoms last month. Patient was treated with prednisone by ENT. Patient defers allergy testing at this time as well as defers considering immunotherapy as a treatment option. Reviewed Dupixent and Xolair as a potential treatment option for nasal polyps. Given his history of asthma as well as atopic dermatitis patient prefers to consider [address] as a treatment option  Application for Dupixent provided  We will keep patient posted on approval process  Continue with current allergy medications including Xyzal Flonase Singulair. May add nasal saline sinus rinses  Complete prednisone and Cipro    #2 allergic rhinitis  Patient defers allergy testing at this time. Patient defers considering immunotherapy as a treatment option. Reviewed avoidance measures  Continue with Xyzal Singulair Flonase    #3 asthma  Denies symptoms more than 2 days/week. Currently off Advair for few years now  Reviewed signs and symptoms of persistent asthma including the rules of 2  Restart Advair if having asthma symptoms more than 2 days/week.     #4 recommend COVID vaccination     #5 recommend flu vaccine in the fall

## 2022-05-24 ENCOUNTER — TELEPHONE (OUTPATIENT)
Dept: ALLERGY | Facility: CLINIC | Age: 42
End: 2022-05-24

## 2022-05-24 NOTE — TELEPHONE ENCOUNTER
Patient completed Dupixent Enrollment Form while in office yesterday. RN to completed the rest of the form. Dr. Jose Almodovar review and sign--placed at nurse's station. Will then fax to [place] [phone] for benefits investigation and PA.

## 2022-05-26 ENCOUNTER — TELEPHONE (OUTPATIENT)
Dept: FAMILY MEDICINE CLINIC | Facility: CLINIC | Age: 42
End: 2022-05-26

## 2022-05-26 RX ORDER — HYDROCODONE BITARTRATE AND ACETAMINOPHEN 10; 325 MG/1; MG/1
1 TABLET ORAL EVERY 6 HOURS PRN
Qty: 30 TABLET | Refills: 0 | Status: SHIPPED | OUTPATIENT
Start: 2022-05-26

## 2022-05-27 NOTE — TELEPHONE ENCOUNTER
Dr. Jennyfer Desir regarding nasal polypsis diagnosis code for application, would you prefer it be:  J33.9 Nasal polyps, unspecified      Or  J33.0 Polyp of nasal cavity    RN to complete diagnosis on Enrollment Form and then fax to [employer] on Tuesday 5/31 when office re-opens. (RN placed Enrollment Form back in patient's paper Dupixent chart).

## 2022-06-01 NOTE — TELEPHONE ENCOUNTER
Completed and signed [employer] Enrollment Form under diagnosis of Nasal Polyps, J33.9 faxed to [employer] at [phone]. Awaiting fax confirmation.

## 2022-06-03 RX ORDER — FLUTICASONE PROPIONATE 50 MCG
SPRAY, SUSPENSION (ML) NASAL
Qty: 48 G | Refills: 4 | Status: SHIPPED | OUTPATIENT
Start: 2022-06-03

## 2022-06-06 ENCOUNTER — PATIENT MESSAGE (OUTPATIENT)
Dept: ALLERGY | Facility: CLINIC | Age: 42
End: 2022-06-06

## 2022-06-07 RX ORDER — CETIRIZINE HYDROCHLORIDE 10 MG/1
10 TABLET ORAL EVERY MORNING
Qty: 90 TABLET | Refills: 0 | Status: SHIPPED | OUTPATIENT
Start: 2022-06-07

## 2022-06-07 NOTE — TELEPHONE ENCOUNTER
Dr. Michela Mathew please see below message. Pt would like to see if insurance will cover zyrtec. RX pended. Please advise.    JAX:[id number] No

## 2022-06-07 NOTE — TELEPHONE ENCOUNTER
From: Mono Aguilar  To: Anita Jones MD  Sent: 6/6/2022 9:30 PM CDT  Subject: Change medicine     I would like to change medicines and see if my insurance will cover Zyrtec generic allergy instead of the one I am using now , can the doctor please send Prescription to the [facility] in [place] on [address]

## 2022-06-07 NOTE — TELEPHONE ENCOUNTER
Medication PA requested:    Dupixent 300 mg/2 mL prefilled syringe. Administer 300 mg subcutaneously every 14 days. No loading dose. Dx Code: Nasal polyp J33.9        Description of Diagnosis:     Key or Insurance Telephone #:    Elizabeth Hurst [facility] [employer]    Phone: [phone]    Fax: [phone]     CPT Code:      Case Number/Pending Referral #:      Pt. Authorization Number:     Pt.  Authorization Date:     Pt contacted/Pharmacy contacted if needed:

## 2022-06-09 RX ORDER — FLUTICASONE PROPIONATE 93 UG/1
2 SPRAY, METERED NASAL 2 TIMES DAILY
Qty: 1 EACH | Refills: 0 | Status: SHIPPED | OUTPATIENT
Start: 2022-06-09

## 2022-06-09 NOTE — TELEPHONE ENCOUNTER
Prescription for [facility] sent to patient's pharmacy.   Patient has tried Flonase and Nasacort in the past.

## 2022-06-09 NOTE — TELEPHONE ENCOUNTER
Dupixent PA Denial Letter received from [facility] via fax. Member must try and fail the following drug alternatives for at least 4 weeks, at least 2 intranasal corticosteroids, one of which must be Xhance.

## 2022-06-10 NOTE — TELEPHONE ENCOUNTER
RN left voicemail and a Lang Mahart message with update notifying pt of PA denial and needing to try [facility].

## 2022-06-25 ENCOUNTER — PATIENT MESSAGE (OUTPATIENT)
Dept: ALLERGY | Facility: CLINIC | Age: 42
End: 2022-06-25

## 2022-06-28 ENCOUNTER — TELEPHONE (OUTPATIENT)
Dept: ALLERGY | Facility: CLINIC | Age: 42
End: 2022-06-28

## 2022-06-28 NOTE — TELEPHONE ENCOUNTER
Ish Galicia contacted. Pharmacist offers that [facility] needs prior authorization. PA request was not received by pharmacy. Pharmacist is faxing PA form for Jill.

## 2022-06-28 NOTE — TELEPHONE ENCOUNTER
Fax received from [place] asking that nurse call [facility] for PA. [facility] contacted. RN informed by PA rep that patient's PA is to be obtained from [address] [place] [address] at [phone]. Spoke with pharmacy , Gina Rogers. Awaiting fax of PA form for [facility].

## 2022-06-29 NOTE — TELEPHONE ENCOUNTER
Medication PA RequestedFluticasone Propionate (XHANCE) 93 MCG/ACT Nasal Exhaler Suspension:                                                          CoverMyMeds Used:  Key:  Quantity:1 each  Day Supply:  Si sprays by Nasal route 2 (two) times daily. - Nasal   DX Code: Nasal polyp J33.9                                      Faxed [employer] PA form with OV 2022  Awaiting determination.

## 2022-06-30 NOTE — TELEPHONE ENCOUNTER
[facility] PA approval letter received via fax. PA has been approved wit a quantity limit of 32 mcg per 30 days from 6/29/2022 - 9/29/2022. The request for 372 mcg for 30 days has been denied. [address] contacted, spoke with pharmacy tech and informed of above approval.     Since Approval is for 32 mcg of Xhance, Rx for XHANCE 93 micrograms will not go through insurance.        Xhance = 93 mcg of fluticasone propionate in each spray

## 2022-07-03 ENCOUNTER — PATIENT MESSAGE (OUTPATIENT)
Dept: ALLERGY | Facility: CLINIC | Age: 42
End: 2022-07-03

## 2022-07-05 NOTE — TELEPHONE ENCOUNTER
Spoke to [employer],  at [facility]. Unfortunately he was unable to assist. We will need to submit an appeal.     Fax a letter stating why the partial approval will not work. Rep unsure if physician signature is needed. RN to submit request without first, as Dr. Flaquita Goddard is out of office for the week. Fax appeal: [phone]    Left message on patient voicemail letting him know we are working on an appeal.     Letter faxed to appeals department as listed above. Successful transmission received. Forms placed in pink Prior authorization folder.

## 2022-07-05 NOTE — TELEPHONE ENCOUNTER
Left message for patient notifying him that we are working on an appeal.  Please see prior authorization encounter dated 6/28/2022 for more information. Close this encounter.

## 2022-07-05 NOTE — TELEPHONE ENCOUNTER
From: Ranjana Landa  To: Niranjan Kaiden  Sent: 6/28/2022 10:45 AM CDT    Good morning [facility],    I looked back at your chart to see what happened with your nasal sprays. In late May, we got notification that Flonase was not covered by your insurance, so Dr. Adrienne Judd sent for Nasonex, which was also not covered. This is likely due to both medication being available as over the counter. We advise you purchase those medications at your pharmacy, but better deals are also available at [employer] or [address] [place] [address] [address]. Let us know if you have any more questions.     [facility],  Ramila Jenkins RN

## 2022-07-06 ENCOUNTER — TELEPHONE (OUTPATIENT)
Dept: FAMILY MEDICINE CLINIC | Facility: CLINIC | Age: 42
End: 2022-07-06

## 2022-07-06 ENCOUNTER — TELEPHONE (OUTPATIENT)
Dept: ALLERGY | Facility: CLINIC | Age: 42
End: 2022-07-06

## 2022-07-06 NOTE — TELEPHONE ENCOUNTER
[facility] calling to request prior authorization for [address].  There is a  resubmission partida # [id number]

## 2022-07-08 ENCOUNTER — TELEPHONE (OUTPATIENT)
Dept: FAMILY MEDICINE CLINIC | Facility: CLINIC | Age: 42
End: 2022-07-08

## 2022-07-16 ENCOUNTER — PATIENT MESSAGE (OUTPATIENT)
Dept: OTHER | Age: 42
End: 2022-07-16

## 2022-07-16 ENCOUNTER — PATIENT MESSAGE (OUTPATIENT)
Dept: ALLERGY | Facility: CLINIC | Age: 42
End: 2022-07-16

## 2022-07-16 NOTE — TELEPHONE ENCOUNTER
From: Tommie Humphrey  To: Frank Caicedo MD  Sent: 7/16/2022 1:30 AM CDT  Subject: Approval     I got a letter from [employer] saying that the nose spray was approved, can you tell me where you are sending in the medicine to which pharmacy

## 2022-07-16 NOTE — TELEPHONE ENCOUNTER
Spoke to Km at [place]. She reports the Xhance nasal spray does go through insurance.   $42 copay.

## 2022-07-27 ENCOUNTER — HOSPITAL ENCOUNTER (EMERGENCY)
Facility: HOSPITAL | Age: 42
Discharge: HOME | End: 2022-07-27
Attending: EMERGENCY MEDICINE
Payer: COMMERCIAL

## 2022-07-27 ENCOUNTER — APPOINTMENT (OUTPATIENT)
Dept: GENERAL RADIOLOGY | Facility: HOSPITAL | Age: 42
End: 2022-07-27
Attending: EMERGENCY MEDICINE
Payer: COMMERCIAL

## 2022-07-27 VITALS
DIASTOLIC BLOOD PRESSURE: 85 MMHG | RESPIRATION RATE: 22 BRPM | OXYGEN SATURATION: 98 % | HEART RATE: 69 BPM | SYSTOLIC BLOOD PRESSURE: 152 MMHG | TEMPERATURE: 98 F

## 2022-07-27 DIAGNOSIS — S76.011A HIP STRAIN, RIGHT, INITIAL ENCOUNTER: ICD-10-CM
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
## 2022-07-27 DIAGNOSIS — S39.012A STRAIN OF LUMBAR REGION, INITIAL ENCOUNTER: Primary | ICD-10-CM

## 2022-07-27 PROCEDURE — 99284 EMERGENCY DEPT VISIT MOD MDM: CPT

## 2022-07-27 PROCEDURE — 72100 X-RAY EXAM L-S SPINE 2/3 VWS: CPT | Performed by: EMERGENCY MEDICINE

## 2022-07-27 PROCEDURE — 99283 EMERGENCY DEPT VISIT LOW MDM: CPT

## 2022-07-27 RX ORDER — CYCLOBENZAPRINE HCL 10 MG
10 TABLET ORAL 3 TIMES DAILY PRN
Qty: 20 TABLET | Refills: 0 | Status: SHIPPED | OUTPATIENT
Start: 2022-07-27 | End: 2022-08-03

## 2022-07-27 RX ORDER — ACETAMINOPHEN 325 MG/1
650 TABLET ORAL EVERY 6 HOURS PRN
Qty: 30 TABLET | Refills: 0 | Status: SHIPPED | OUTPATIENT
Start: 2022-07-27
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
## 2022-07-28 ENCOUNTER — OFFICE VISIT (OUTPATIENT)
Dept: FAMILY MEDICINE CLINIC | Facility: CLINIC | Age: 42
End: 2022-07-28
Payer: COMMERCIAL

## 2022-07-28 VITALS
SYSTOLIC BLOOD PRESSURE: 149 MMHG | WEIGHT: 242 LBS | DIASTOLIC BLOOD PRESSURE: 92 MMHG | BODY MASS INDEX: 37.98 KG/M2 | HEIGHT: 67 IN | HEART RATE: 98 BPM

## 2022-07-28 DIAGNOSIS — R51.9 ACUTE NONINTRACTABLE HEADACHE, UNSPECIFIED HEADACHE TYPE: Primary | ICD-10-CM

## 2022-07-28 PROCEDURE — 3080F DIAST BP >= 90 MM HG: CPT | Performed by: FAMILY MEDICINE

## 2022-07-28 PROCEDURE — 3008F BODY MASS INDEX DOCD: CPT | Performed by: FAMILY MEDICINE

## 2022-07-28 PROCEDURE — 99214 OFFICE O/P EST MOD 30 MIN: CPT | Performed by: FAMILY MEDICINE

## 2022-07-28 PROCEDURE — 3077F SYST BP >= 140 MM HG: CPT | Performed by: FAMILY MEDICINE

## 2022-07-28 RX ORDER — METHYLPREDNISOLONE 4 MG/1
TABLET ORAL
Qty: 1 EACH | Refills: 1 | Status: SHIPPED | OUTPATIENT
Start: 2022-07-28

## 2022-07-28 RX ORDER — LOSARTAN POTASSIUM 50 MG/1
TABLET ORAL
COMMUNITY
Start: 2022-07-08

## 2022-07-28 RX ORDER — HYDROCODONE BITARTRATE AND ACETAMINOPHEN 10; 325 MG/1; MG/1
1 TABLET ORAL EVERY 6 HOURS PRN
Qty: 20 TABLET | Refills: 0 | Status: SHIPPED | OUTPATIENT
Start: 2022-07-28
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
## 2022-07-29 NOTE — TELEPHONE ENCOUNTER
Patient unable to purchase [facility] as it is too expensive. He is wondering if Dr. Jennyfer Desir has any further recommendations besides [facility] that would be more cost effective? He has already tried Flonase, Nasonex and saline rinses. Routed to Dr. Jennyfer eDsir.

## 2022-07-30 ENCOUNTER — PATIENT MESSAGE (OUTPATIENT)
Dept: FAMILY MEDICINE CLINIC | Facility: CLINIC | Age: 42
End: 2022-07-30

## 2022-07-30 ENCOUNTER — TELEPHONE (OUTPATIENT)
Dept: FAMILY MEDICINE CLINIC | Facility: CLINIC | Age: 42
End: 2022-07-30

## 2022-07-30 ENCOUNTER — HOSPITAL ENCOUNTER (OUTPATIENT)
Dept: CT IMAGING | Age: 42
Discharge: HOME | End: 2022-07-30
Attending: FAMILY MEDICINE
Payer: COMMERCIAL

## 2022-07-30 DIAGNOSIS — M54.50 ACUTE RIGHT-SIDED LOW BACK PAIN, UNSPECIFIED WHETHER SCIATICA PRESENT: ICD-10-CM
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
## 2022-07-30 DIAGNOSIS — M25.551 RIGHT HIP PAIN: ICD-10-CM

## 2022-07-30 DIAGNOSIS — R51.9 ACUTE NONINTRACTABLE HEADACHE, UNSPECIFIED HEADACHE TYPE: ICD-10-CM

## 2022-07-30 PROCEDURE — 70450 CT HEAD/BRAIN W/O DYE: CPT | Performed by: FAMILY MEDICINE

## 2022-07-30 NOTE — TELEPHONE ENCOUNTER
Patient following up for further recommendations to help manage his pain. Reports he did take medrol dosepak also. Also asking if he should have physical therapy. Please advise.

## 2022-07-30 NOTE — TELEPHONE ENCOUNTER
Patient to go to ER if pain becomes severe. Also go to ER if headache worsens. Patient to contact me on Monday and will order for PT if no improvement.   Continue Medrol Dosepak and Norco.

## 2022-08-01 ENCOUNTER — TELEPHONE (OUTPATIENT)
Dept: PHYSICAL THERAPY | Facility: HOSPITAL | Age: 42
End: 2022-08-01

## 2022-08-01 ENCOUNTER — PATIENT MESSAGE (OUTPATIENT)
Dept: FAMILY MEDICINE CLINIC | Facility: CLINIC | Age: 42
End: 2022-08-01

## 2022-08-01 NOTE — TELEPHONE ENCOUNTER
Patient calling regarding referral. Claude Flores below. To schedule Physical Therapy at any of the [facility] facilities, please call [phone]. [address] for [facility]  [address] [address] [facility] [address] [facility], [address] [place] [address] [address]                        Pediatric Rehab Services  for [facility]  [address], [address] [facility], [address]                                                                                                                         [address] [facility] Physical Therapy  [address] [facility]  [address] [facility], [address] [place] [address]
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
## 2022-08-01 NOTE — TELEPHONE ENCOUNTER
Leona Rivers is too expensive and would recommend use Flonase or Nasonex 2 sprays per twice a day for underlying nasal polyps

## 2022-08-01 NOTE — TELEPHONE ENCOUNTER
From: Alvino Soria  To: Chas Chan DO  Sent: 8/1/2022 1:02 PM CDT  Subject: Physical therapy     Just checking to see if doctor ordered the physical therapy yet .  Thank you

## 2022-08-03 ENCOUNTER — TELEPHONE (OUTPATIENT)
Dept: FAMILY MEDICINE CLINIC | Facility: CLINIC | Age: 42
End: 2022-08-03

## 2022-08-03 ENCOUNTER — PATIENT MESSAGE (OUTPATIENT)
Dept: FAMILY MEDICINE CLINIC | Facility: CLINIC | Age: 42
End: 2022-08-03
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
## 2022-08-03 NOTE — TELEPHONE ENCOUNTER
Patient Review of Clinical Information      Problems    The patient or proxy has not reviewed this information, and there are updates pending:     Requested Problem Removals Date Noted Reported By  Comments    Obesity (BMI 35.0-39.9 without comorbidity) 3/16/2019 Ashley Lewis                  Medications    The patient or proxy has not reviewed this information. Allergies    The patient or proxy has not reviewed this information.              My chart message sent to patient

## 2022-08-04 RX ORDER — HYDROCODONE BITARTRATE AND ACETAMINOPHEN 10; 325 MG/1; MG/1
1 TABLET ORAL EVERY 6 HOURS PRN
Qty: 20 TABLET | Refills: 0 | Status: SHIPPED | OUTPATIENT
Start: 2022-08-04

## 2022-08-04 RX ORDER — CYCLOBENZAPRINE HCL 10 MG
10 TABLET ORAL 3 TIMES DAILY PRN
Qty: 20 TABLET | Refills: 0 | Status: SHIPPED | OUTPATIENT
Start: 2022-08-04 | End: 2022-08-11

## 2022-08-19 NOTE — TELEPHONE ENCOUNTER
Please review. No protocol. Requested Prescriptions   Pending Prescriptions Disp Refills    HYDROcodone-acetaminophen (NORCO)  MG Oral Tab 20 tablet 0     Sig: Take 1 tablet by mouth every 6 (six) hours as needed.         There is no refill protocol information for this order           Recent Outpatient Visits              3 weeks ago Acute nonintractable headache, unspecified headache type    [facility], [address], DO    Office Visit    2 months ago Moderate persistent extrinsic asthma without complication    [facility], [address], [place], Baldo Mace MD    Office Visit    3 months ago Environmental allergies    [facility], [address], Amanda Downs MD    Office Visit    4 months ago Sinusitis, unspecified chronicity, unspecified location    [facility] [facility], [address], [place],     Telemedicine    5 months ago Acute maxillary sinusitis, recurrence not specified    [address]    Nurse Only

## 2022-08-24 NOTE — TELEPHONE ENCOUNTER
Advised patient of KK note below. Patient verbalized understanding. Patient vital signs are at baseline: Yes  Patient able to ambulate as they were prior to admission or with assist devices provided by therapies during their stay:  Yes  Patient MUST void prior to discharge:  Yes  Patient able to tolerate oral intake:  Yes  Pain has adequate pain control using Oral analgesics:  Yes  Does patient have an identified :  Yes  Has goal D/C date and time been discussed with patient:  Yes     Pt. A&O x4. 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 CMS intact and he is vitally stable.

## 2022-08-30 DIAGNOSIS — E78.5 HYPERLIPIDEMIA, UNSPECIFIED HYPERLIPIDEMIA TYPE: Primary | ICD-10-CM

## 2022-08-31 RX ORDER — CETIRIZINE HYDROCHLORIDE 10 MG/1
10 TABLET ORAL EVERY MORNING
Qty: 90 TABLET | Refills: 0 | Status: SHIPPED | OUTPATIENT
Start: 2022-08-31

## 2022-08-31 NOTE — TELEPHONE ENCOUNTER
Routed to Dr Chris Torres for advise, thanks. Also pt due for labs, lipid order pended. Refill passed per [facility] protocol   Requested Prescriptions   Pending Prescriptions Disp Refills    pravastatin 10 MG Oral Tab 90 tablet 0     Sig: Take 1 tablet (10 mg total) by mouth nightly. Cholesterol Medication Protocol Passed - 8/30/2022  9:23 PM        Passed - ALT in past 12 months        Passed - LDL in past 12 months        Passed - Last ALT < 80       Lab Results   Component Value Date    ALT 40 09/04/2021             Passed - Last LDL < 130     Lab Results   Component Value Date     (H) 09/04/2021               Passed - In person appointment or virtual visit in the past 12 mos or appointment in next 3 mos       Recent Outpatient Visits              1 month ago Acute nonintractable headache, unspecified headache type    [facility], [address], DO    Office Visit    3 months ago Moderate persistent extrinsic asthma without complication    [facility], [facility], [place] GARCIA, Justus Zheng MD    Office Visit    3 months ago Environmental allergies    [facility], [address], Katerine Temple MD    Office Visit    4 months ago Sinusitis, unspecified chronicity, unspecified location    [facility], [facility], [address] [facility],     Telemedicine    6 months ago Acute maxillary sinusitis, recurrence not specified    [address]    Nurse Only                    HYDROcodone-acetaminophen (NORCO)  MG Oral Tab 10 tablet 0     Sig: Take 1 tablet by mouth every 6 (six) hours as needed.         There is no refill protocol information for this order

## 2022-09-01 RX ORDER — HYDROCODONE BITARTRATE AND ACETAMINOPHEN 10; 325 MG/1; MG/1
1 TABLET ORAL EVERY 6 HOURS PRN
Qty: 10 TABLET | Refills: 0 | OUTPATIENT
Start: 2022-09-01

## 2022-09-01 RX ORDER — PRAVASTATIN SODIUM 10 MG
10 TABLET ORAL NIGHTLY
Qty: 90 TABLET | Refills: 3 | Status: SHIPPED | OUTPATIENT
Start: 2022-09-01 | End: 2023-02-25

## 2022-09-14 NOTE — TELEPHONE ENCOUNTER
Please review refill protocol failed/ no protocol  Requested Prescriptions   Pending Prescriptions Disp Refills    HYDROcodone-acetaminophen (NORCO)  MG Oral Tab 10 tablet 0     Sig: Take 1 tablet by mouth every 6 (six) hours as needed.         There is no refill protocol information for this order

## 2022-09-15 RX ORDER — HYDROCODONE BITARTRATE AND ACETAMINOPHEN 10; 325 MG/1; MG/1
1 TABLET ORAL EVERY 6 HOURS PRN
Qty: 10 TABLET | Refills: 0 | OUTPATIENT
Start: 2022-09-15

## 2022-09-17 ENCOUNTER — PATIENT MESSAGE (OUTPATIENT)
Dept: FAMILY MEDICINE CLINIC | Facility: CLINIC | Age: 42
End: 2022-09-17
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
## 2022-09-17 DIAGNOSIS — M54.50 ACUTE RIGHT-SIDED LOW BACK PAIN, UNSPECIFIED WHETHER SCIATICA PRESENT: ICD-10-CM

## 2022-09-18 NOTE — TELEPHONE ENCOUNTER
See also Pt Message 9/13/22. Office staff awaiting on physical therapy notes. From: Sharita Mckay  To: Teresa Chan DO  Sent: 9/17/2022 11:12 PM CDT  Subject: Referral     I am checking to see if dr Uvaldo Bernard sent my physical therapy place a new order for p/t

## 2022-09-19 RX ORDER — HYDROCODONE BITARTRATE AND ACETAMINOPHEN 10; 325 MG/1; MG/1
1 TABLET ORAL EVERY 6 HOURS PRN
Qty: 10 TABLET | Refills: 0 | OUTPATIENT
Start: 2022-09-19

## 2022-11-05 RX ORDER — CETIRIZINE HYDROCHLORIDE 10 MG/1
10 TABLET ORAL EVERY MORNING
Qty: 90 TABLET | Refills: 0 | Status: SHIPPED | OUTPATIENT
Start: 2022-11-05

## 2022-11-05 NOTE — TELEPHONE ENCOUNTER
Received refill request for generic Zyrtec 10 mg -1 tablet by mouth daily. Last office visit was 5/23/22 for allergies- refill passes protocol- refilled.

## 2022-12-03 ENCOUNTER — OFFICE VISIT (OUTPATIENT)
Dept: FAMILY MEDICINE CLINIC | Facility: CLINIC | Age: 42
End: 2022-12-03
Payer: COMMERCIAL

## 2022-12-03 VITALS
WEIGHT: 241 LBS | HEART RATE: 75 BPM | DIASTOLIC BLOOD PRESSURE: 87 MMHG | BODY MASS INDEX: 37.83 KG/M2 | SYSTOLIC BLOOD PRESSURE: 154 MMHG | HEIGHT: 67 IN

## 2022-12-03 DIAGNOSIS — M70.60 GREATER TROCHANTERIC BURSITIS, UNSPECIFIED LATERALITY: Primary | ICD-10-CM

## 2022-12-03 PROCEDURE — 3077F SYST BP >= 140 MM HG: CPT | Performed by: FAMILY MEDICINE

## 2022-12-03 PROCEDURE — 99213 OFFICE O/P EST LOW 20 MIN: CPT | Performed by: FAMILY MEDICINE

## 2022-12-03 PROCEDURE — 3079F DIAST BP 80-89 MM HG: CPT | Performed by: FAMILY MEDICINE

## 2022-12-03 PROCEDURE — 90686 IIV4 VACC NO PRSV 0.5 ML IM: CPT | Performed by: FAMILY MEDICINE

## 2022-12-03 PROCEDURE — 3008F BODY MASS INDEX DOCD: CPT | Performed by: FAMILY MEDICINE

## 2022-12-03 PROCEDURE — 90471 IMMUNIZATION ADMIN: CPT | Performed by: FAMILY MEDICINE

## 2022-12-03 RX ORDER — LOSARTAN POTASSIUM 100 MG/1
100 TABLET ORAL DAILY
Qty: 30 TABLET | Refills: 2 | Status: SHIPPED | OUTPATIENT
Start: 2022-12-03

## 2022-12-03 RX ORDER — METHYLPREDNISOLONE 4 MG/1
TABLET ORAL
Qty: 1 EACH | Refills: 1 | Status: SHIPPED | OUTPATIENT
Start: 2022-12-03

## 2022-12-03 RX ORDER — HYDROCODONE BITARTRATE AND ACETAMINOPHEN 10; 325 MG/1; MG/1
1 TABLET ORAL EVERY 6 HOURS PRN
Qty: 30 TABLET | Refills: 0 | Status: CANCELLED | OUTPATIENT
Start: 2022-12-03
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
## 2022-12-09 ENCOUNTER — PATIENT MESSAGE (OUTPATIENT)
Dept: FAMILY MEDICINE CLINIC | Facility: CLINIC | Age: 42
End: 2022-12-09

## 2022-12-10 ENCOUNTER — TELEPHONE (OUTPATIENT)
Dept: FAMILY MEDICINE CLINIC | Facility: CLINIC | Age: 42
End: 2022-12-10
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
## 2022-12-12 NOTE — TELEPHONE ENCOUNTER
Please advice on if he should take another Medrol dose pack as discussed at this office visit? Per the patient   He took all the medrol dose pack and it did not make any difference. The patient stated he saw Dr. Shyam Rdz on 12/3/22 for    Greater trochanteric bursitis, unspecified laterality    The patient stated he continues to have discomfort. Per the patient the discomfort varies at times. He has went to PT and after PT is when his discomfort came back. He also has an appointment on Saturday 12/17/22. Derrick Salinas He stated he would like sent to Dr. Shyam Rdz and can wait until tomorrow if needed.

## 2022-12-12 NOTE — TELEPHONE ENCOUNTER
Patient notified of provider's recommendation. Patient verbalized understanding. He will defer from taking another medrol dustin for now since it didn't help. He is not in any severe pain. He will wait for his upcoming appt to discuss further plan.

## 2022-12-15 ENCOUNTER — HOSPITAL ENCOUNTER (OUTPATIENT)
Dept: GENERAL RADIOLOGY | Age: 42
Discharge: HOME | End: 2022-12-15
Attending: FAMILY MEDICINE
Payer: COMMERCIAL

## 2022-12-15 ENCOUNTER — OFFICE VISIT (OUTPATIENT)
Dept: FAMILY MEDICINE CLINIC | Facility: CLINIC | Age: 42
End: 2022-12-15
Payer: COMMERCIAL

## 2022-12-15 VITALS
HEIGHT: 67 IN | SYSTOLIC BLOOD PRESSURE: 138 MMHG | WEIGHT: 247.69 LBS | DIASTOLIC BLOOD PRESSURE: 89 MMHG | HEART RATE: 76 BPM | BODY MASS INDEX: 38.88 KG/M2

## 2022-12-15 DIAGNOSIS — M79.18 RIGHT BUTTOCK PAIN: Primary | ICD-10-CM

## 2022-12-15 DIAGNOSIS — M79.18 RIGHT BUTTOCK PAIN: ICD-10-CM

## 2022-12-15 PROCEDURE — 73502 X-RAY EXAM HIP UNI 2-3 VIEWS: CPT | Performed by: FAMILY MEDICINE

## 2022-12-15 PROCEDURE — 3008F BODY MASS INDEX DOCD: CPT | Performed by: FAMILY MEDICINE

## 2022-12-15 PROCEDURE — 3075F SYST BP GE 130 - 139MM HG: CPT | Performed by: FAMILY MEDICINE

## 2022-12-15 PROCEDURE — 3079F DIAST BP 80-89 MM HG: CPT | Performed by: FAMILY MEDICINE

## 2022-12-15 PROCEDURE — 99213 OFFICE O/P EST LOW 20 MIN: CPT | Performed by: FAMILY MEDICINE

## 2022-12-15 RX ORDER — PREDNISONE 10 MG/1
10 TABLET ORAL 3 TIMES DAILY
Qty: 15 TABLET | Refills: 0 | Status: SHIPPED | OUTPATIENT
Start: 2022-12-15
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
## 2022-12-16 ENCOUNTER — PATIENT MESSAGE (OUTPATIENT)
Dept: FAMILY MEDICINE CLINIC | Facility: CLINIC | Age: 42
End: 2022-12-16
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
## 2023-01-01 NOTE — TELEPHONE ENCOUNTER
Please review. Protocol failed / No protocol. Requested Prescriptions   Pending Prescriptions Disp Refills    Ursodiol 500 MG Oral Tab 60 tablet 5     Sig: Take 1 tablet (500 mg total) by mouth 2 (two) times daily.        There is no refill protocol information for this order          Recent Outpatient Visits              2 weeks ago Right buttock pain    [facility], [address], DO    Office Visit    4 weeks ago Greater trochanteric bursitis, unspecified laterality    [place] [address], [address], DO    Office Visit    5 months ago Acute nonintractable headache, unspecified headache type    [place] [address], [address], DO    Office Visit    7 months ago Moderate persistent extrinsic asthma without complication    Jacqueline Parada MD    Office Visit    7 months ago Environmental allergies    [facility] Mariusz Molina MD    Office Visit

## 2023-01-03 RX ORDER — CETIRIZINE HYDROCHLORIDE 10 MG/1
10 TABLET ORAL EVERY MORNING
Qty: 90 TABLET | Refills: 0 | Status: SHIPPED | OUTPATIENT
Start: 2023-01-03

## 2023-01-03 RX ORDER — URSODIOL 500 MG/1
500 TABLET, FILM COATED ORAL 2 TIMES DAILY
Qty: 60 TABLET | Refills: 5 | Status: SHIPPED | OUTPATIENT
Start: 2023-01-03

## 2023-01-03 NOTE — TELEPHONE ENCOUNTER
Received a refill request for Zyrtec 10 mg-1 tablet by mouth daily. Last office visit was 5/23/22 for allergies. Refill for antihistamine passed protocol. refilled.

## 2023-02-27 RX ORDER — CETIRIZINE HYDROCHLORIDE 10 MG/1
10 TABLET ORAL EVERY MORNING
Qty: 90 TABLET | Refills: 0 | Status: SHIPPED | OUTPATIENT
Start: 2023-02-27

## 2023-03-08 ENCOUNTER — TELEPHONE (OUTPATIENT)
Dept: FAMILY MEDICINE CLINIC | Facility: CLINIC | Age: 43
End: 2023-03-08

## 2023-03-08 ENCOUNTER — PATIENT MESSAGE (OUTPATIENT)
Dept: FAMILY MEDICINE CLINIC | Facility: CLINIC | Age: 43
End: 2023-03-08

## 2023-03-08 RX ORDER — AMOXICILLIN AND CLAVULANATE POTASSIUM 875; 125 MG/1; MG/1
1 TABLET, FILM COATED ORAL 2 TIMES DAILY
Qty: 20 TABLET | Refills: 0 | Status: SHIPPED | OUTPATIENT
Start: 2023-03-08 | End: 2023-03-18

## 2023-03-08 NOTE — TELEPHONE ENCOUNTER
On call paged. Reports having bad sinus congestion and infections. Reports his PCP usually gives him medications. On flonase. Just received a call from nursing to call them but he called was sent to page me. I sent augmentin.

## 2023-03-08 NOTE — TELEPHONE ENCOUNTER
From: Maegan Villa  To: Eddie Chan DO  Sent: 3/8/2023 9:14 AM CST  Subject: Sinus congestion     I am having a lot of congestion in the nose and dripping, I am coughing up yellow and green and my face hurts I know I have a sinus infection, can doctor send a prescription for this problem to my pharmacy the [place] [id number]

## 2023-03-25 ENCOUNTER — LAB ENCOUNTER (OUTPATIENT)
Dept: LAB | Age: 43
End: 2023-03-25
Attending: FAMILY MEDICINE
Payer: COMMERCIAL

## 2023-03-25 DIAGNOSIS — E78.5 HYPERLIPIDEMIA, UNSPECIFIED HYPERLIPIDEMIA TYPE: ICD-10-CM

## 2023-03-25 LAB
CHOLEST SERPL-MCNC: 176 MG/DL (ref ?–200)
FASTING PATIENT LIPID ANSWER: YES
HDLC SERPL-MCNC: 36 MG/DL (ref 40–59)
LDLC SERPL CALC-MCNC: 94 MG/DL (ref ?–100)
NONHDLC SERPL-MCNC: 140 MG/DL (ref ?–130)
TRIGL SERPL-MCNC: 276 MG/DL (ref 30–149)
VLDLC SERPL CALC-MCNC: 46 MG/DL (ref 0–30)

## 2023-03-25 PROCEDURE — 36415 COLL VENOUS BLD VENIPUNCTURE: CPT

## 2023-03-25 PROCEDURE — 80061 LIPID PANEL: CPT

## 2023-03-27 ENCOUNTER — OFFICE VISIT (OUTPATIENT)
Dept: FAMILY MEDICINE CLINIC | Facility: CLINIC | Age: 43
End: 2023-03-27

## 2023-03-27 VITALS
HEART RATE: 89 BPM | DIASTOLIC BLOOD PRESSURE: 89 MMHG | SYSTOLIC BLOOD PRESSURE: 149 MMHG | WEIGHT: 245 LBS | HEIGHT: 67 IN | BODY MASS INDEX: 38.45 KG/M2 | OXYGEN SATURATION: 97 %

## 2023-03-27 DIAGNOSIS — Z00.00 ROUTINE GENERAL MEDICAL EXAMINATION AT A HEALTH CARE FACILITY: Primary | ICD-10-CM

## 2023-03-27 DIAGNOSIS — K21.00 GASTROESOPHAGEAL REFLUX DISEASE WITH ESOPHAGITIS WITHOUT HEMORRHAGE: ICD-10-CM

## 2023-03-27 DIAGNOSIS — M79.641 BILATERAL HAND PAIN: ICD-10-CM

## 2023-03-27 DIAGNOSIS — J30.89 NON-SEASONAL ALLERGIC RHINITIS, UNSPECIFIED TRIGGER: ICD-10-CM

## 2023-03-27 DIAGNOSIS — J45.40 MODERATE PERSISTENT ASTHMA WITHOUT COMPLICATION: ICD-10-CM

## 2023-03-27 DIAGNOSIS — E78.5 HYPERLIPIDEMIA, UNSPECIFIED HYPERLIPIDEMIA TYPE: ICD-10-CM

## 2023-03-27 DIAGNOSIS — M79.642 BILATERAL HAND PAIN: ICD-10-CM

## 2023-03-27 DIAGNOSIS — E55.9 VITAMIN D DEFICIENCY: ICD-10-CM

## 2023-03-27 DIAGNOSIS — I10 ESSENTIAL HYPERTENSION: ICD-10-CM

## 2023-03-27 PROBLEM — K81.9 CHOLECYSTITIS: Status: RESOLVED | Noted: 2017-03-03 | Resolved: 2023-03-27

## 2023-03-27 PROCEDURE — 3077F SYST BP >= 140 MM HG: CPT | Performed by: FAMILY MEDICINE

## 2023-03-27 PROCEDURE — 3008F BODY MASS INDEX DOCD: CPT | Performed by: FAMILY MEDICINE

## 2023-03-27 PROCEDURE — 3079F DIAST BP 80-89 MM HG: CPT | Performed by: FAMILY MEDICINE

## 2023-03-27 PROCEDURE — 99396 PREV VISIT EST AGE 40-64: CPT | Performed by: FAMILY MEDICINE

## 2023-03-27 RX ORDER — URSODIOL 500 MG/1
500 TABLET, FILM COATED ORAL 2 TIMES DAILY
Qty: 60 TABLET | Refills: 11 | Status: SHIPPED | OUTPATIENT
Start: 2023-03-27

## 2023-03-27 RX ORDER — MELOXICAM 15 MG/1
15 TABLET ORAL DAILY PRN
Qty: 30 TABLET | Refills: 3 | Status: SHIPPED | OUTPATIENT
Start: 2023-03-27 | End: 2024-03-21

## 2023-04-26 NOTE — PROGRESS NOTES
Juan Bob is a 45year old male. Patient presents with:  Derm Problem: New pt present with dry cracked hands to bilateral hands x6 months.  rx'd mometasone with no improvement.  Pt currently using OTC eczema cream.             Pea thin layer to affected area(s). tid 50 g 3   • methylPREDNISolone (MEDROL) 4 MG Oral Tablet Therapy Pack As directed.  (Patient not taking: Reported on 12/21/2020 ) 1 kit 1      Past Medical History:   Diagnosis Date   • Aspirin sensitivity     ASA [employer] g 0   • Esomeprazole Magnesium 20 MG Oral Capsule Delayed Release Take 1 capsule (20 mg total) by mouth every morning before breakfast. 90 capsule 1   • HYDROcodone-acetaminophen (NORCO) 7.5-325 MG Oral Tab Take 1 tablet by mouth every 6 (six) hours as nee Socioeconomic History      Marital status: Single      Spouse name: Not on file      Number of children: 1      Years of education: Not on file      Highest education level: Not on file    Occupational History      Occupation: /[employer]    S History Narrative      Not on file    Family History   Problem Relation Age of Onset   • Asthma Mother    • Prostate Cancer Father    • Hypertension Father                       HPI :      Patient presents with:  Derm Problem: New pt present with dry crack worsening problem    Dermatitis. Chronic hand dermatitis suspect atopic component given history of atopy encourage Singulair consistently, add Xyzal.  Mupirocin to crusted areas add Protopic along with topical steroid if needed.   Application structures re this or any previous visit (from the past 48 hour(s)). Meds This Visit:      Imaging Orders:  None     Referral Orders:  No orders of the defined types were placed in this encounter. [Time Spent: ___ minutes] : I have spent [unfilled] minutes of time on the encounter.

## 2023-04-29 ENCOUNTER — LAB ENCOUNTER (OUTPATIENT)
Dept: LAB | Facility: REFERENCE LAB | Age: 43
End: 2023-04-29
Attending: FAMILY MEDICINE
Payer: COMMERCIAL

## 2023-04-29 DIAGNOSIS — Z00.00 ROUTINE GENERAL MEDICAL EXAMINATION AT A HEALTH CARE FACILITY: ICD-10-CM

## 2023-04-29 DIAGNOSIS — M79.641 BILATERAL HAND PAIN: ICD-10-CM

## 2023-04-29 DIAGNOSIS — E55.9 VITAMIN D DEFICIENCY: ICD-10-CM

## 2023-04-29 DIAGNOSIS — M79.642 BILATERAL HAND PAIN: ICD-10-CM

## 2023-04-29 LAB
ALBUMIN SERPL-MCNC: 4 G/DL (ref 3.4–5)
ALBUMIN/GLOB SERPL: 1.3 {RATIO} (ref 1–2)
ALP LIVER SERPL-CCNC: 59 U/L
ALT SERPL-CCNC: 43 U/L
ANION GAP SERPL CALC-SCNC: 6 MMOL/L (ref 0–18)
AST SERPL-CCNC: 22 U/L (ref 15–37)
BASOPHILS # BLD AUTO: 0.1 X10(3) UL (ref 0–0.2)
BASOPHILS NFR BLD AUTO: 1.3 %
BILIRUB SERPL-MCNC: 0.4 MG/DL (ref 0.1–2)
BUN BLD-MCNC: 17 MG/DL (ref 7–18)
BUN/CREAT SERPL: 24.3 (ref 10–20)
CALCIUM BLD-MCNC: 9.1 MG/DL (ref 8.5–10.1)
CHLORIDE SERPL-SCNC: 105 MMOL/L (ref 98–112)
CO2 SERPL-SCNC: 29 MMOL/L (ref 21–32)
CREAT BLD-MCNC: 0.7 MG/DL
DEPRECATED RDW RBC AUTO: 37.2 FL (ref 35.1–46.3)
EOSINOPHIL # BLD AUTO: 0.34 X10(3) UL (ref 0–0.7)
EOSINOPHIL NFR BLD AUTO: 4.4 %
ERYTHROCYTE [DISTWIDTH] IN BLOOD BY AUTOMATED COUNT: 12.2 % (ref 11–15)
FASTING STATUS PATIENT QL REPORTED: YES
GFR SERPLBLD BASED ON 1.73 SQ M-ARVRAT: 119 ML/MIN/1.73M2 (ref 60–?)
GLOBULIN PLAS-MCNC: 3.2 G/DL (ref 2.8–4.4)
GLUCOSE BLD-MCNC: 87 MG/DL (ref 70–99)
HCT VFR BLD AUTO: 48.5 %
HGB BLD-MCNC: 15.9 G/DL
IMM GRANULOCYTES # BLD AUTO: 0.02 X10(3) UL (ref 0–1)
IMM GRANULOCYTES NFR BLD: 0.3 %
LYMPHOCYTES # BLD AUTO: 2.75 X10(3) UL (ref 1–4)
LYMPHOCYTES NFR BLD AUTO: 35.3 %
MCH RBC QN AUTO: 27.7 PG (ref 26–34)
MCHC RBC AUTO-ENTMCNC: 32.8 G/DL (ref 31–37)
MCV RBC AUTO: 84.6 FL
MONOCYTES # BLD AUTO: 0.46 X10(3) UL (ref 0.1–1)
MONOCYTES NFR BLD AUTO: 5.9 %
NEUTROPHILS # BLD AUTO: 4.13 X10 (3) UL (ref 1.5–7.7)
NEUTROPHILS # BLD AUTO: 4.13 X10(3) UL (ref 1.5–7.7)
NEUTROPHILS NFR BLD AUTO: 52.8 %
OSMOLALITY SERPL CALC.SUM OF ELEC: 291 MOSM/KG (ref 275–295)
PLATELET # BLD AUTO: 262 10(3)UL (ref 150–450)
POTASSIUM SERPL-SCNC: 4.1 MMOL/L (ref 3.5–5.1)
PROT SERPL-MCNC: 7.2 G/DL (ref 6.4–8.2)
RBC # BLD AUTO: 5.73 X10(6)UL
RHEUMATOID FACT SERPL-ACNC: <10 IU/ML (ref ?–15)
SODIUM SERPL-SCNC: 140 MMOL/L (ref 136–145)
VIT D+METAB SERPL-MCNC: 17.8 NG/ML (ref 30–100)
WBC # BLD AUTO: 7.8 X10(3) UL (ref 4–11)

## 2023-04-29 PROCEDURE — 80053 COMPREHEN METABOLIC PANEL: CPT

## 2023-04-29 PROCEDURE — 86431 RHEUMATOID FACTOR QUANT: CPT

## 2023-04-29 PROCEDURE — 36415 COLL VENOUS BLD VENIPUNCTURE: CPT

## 2023-04-29 PROCEDURE — 82306 VITAMIN D 25 HYDROXY: CPT

## 2023-04-29 PROCEDURE — 85025 COMPLETE CBC W/AUTO DIFF WBC: CPT

## 2023-05-04 ENCOUNTER — PATIENT MESSAGE (OUTPATIENT)
Dept: FAMILY MEDICINE CLINIC | Facility: CLINIC | Age: 43
End: 2023-05-04

## 2023-05-09 DIAGNOSIS — R79.89 LOW VITAMIN D LEVEL: Primary | ICD-10-CM

## 2023-05-09 RX ORDER — ERGOCALCIFEROL 1.25 MG/1
50000 CAPSULE ORAL WEEKLY
Qty: 5 CAPSULE | Refills: 2 | Status: SHIPPED | OUTPATIENT
Start: 2023-05-09 | End: 2023-06-08

## 2023-05-25 RX ORDER — LOSARTAN POTASSIUM 100 MG/1
TABLET ORAL
Qty: 90 TABLET | Refills: 0 | Status: SHIPPED | OUTPATIENT
Start: 2023-05-25

## 2023-05-25 RX ORDER — PRAVASTATIN SODIUM 10 MG
10 TABLET ORAL NIGHTLY
Qty: 90 TABLET | Refills: 3 | Status: SHIPPED | OUTPATIENT
Start: 2023-05-25

## 2023-05-25 NOTE — TELEPHONE ENCOUNTER
Please review. Protocol failed for Losartan    Refill for Pravastatin passed per [facility] protocol. Please review pended refill request for Pravastatin as unable to refill due to high/very high drug interaction warning copied here:    High  Allergy/Contraindication: pravastatin  Reactions: OTHER (SEE COMMENTS). Reaction type: Intolerance. User documented allergy severity: Medium. Level 2 with ATORVASTATIN (Class: STATINS).   \"Other reaction(s): ruq abd pain\"  Details         Requested Prescriptions   Pending Prescriptions Disp Refills    PRAVASTATIN 10 MG Oral Tab [Pharmacy Med Name: PRAVASTATIN 10MG TABLETS] 90 tablet 0     Sig: TAKE 1 TABLET(10 MG) BY MOUTH EVERY NIGHT       Cholesterol Medication Protocol Passed - 5/25/2023  9:17 AM        Passed - ALT in past 12 months        Passed - LDL in past 12 months        Passed - Last ALT < 80     Lab Results   Component Value Date    ALT 43 04/29/2023             Passed - Last LDL < 130     Lab Results   Component Value Date    LDL 94 03/25/2023               Passed - In person appointment or virtual visit in the past 12 mos or appointment in next 3 mos     Recent Outpatient Visits              1 month ago Routine general medical examination at a health care facility    Ana Munoz, [address] [place] [address]    Office Visit    5 months ago Right buttock pain    [facility], [address], [place], DO    Office Visit    5 months ago Greater trochanteric bursitis, unspecified laterality    [facility], [address], [place], DO    Office Visit    10 months ago Acute nonintractable headache, unspecified headache type    [facility], [address], [place], DO    Office Visit    1 year ago Moderate persistent extrinsic asthma without complication    [facility], [address] [place] [address] Caleb Ross MD    Office Visit                        LOSARTAN 100 MG Oral Tab [Pharmacy Med Name: LOSARTAN 100MG TABLETS] 90 tablet 0     Sig: TAKE 1 TABLET(100 MG) BY MOUTH DAILY       Hypertensive Medications Protocol Failed - 5/25/2023  9:17 AM        Failed - Last BP reading less than 140/90     BP Readings from Last 1 Encounters:  03/27/23 : 149/89                Passed - In person appointment in the past 12 or next 3 months     Recent Outpatient Visits              1 month ago Routine general medical examination at a health care facility    [address] [place] [facility], [address] [facility], [address]    Office Visit    5 months ago Right buttock pain    [facility], [address], [place], Louise Mindy, DO    Office Visit    5 months ago Greater trochanteric bursitis, unspecified laterality    [facility], [address], [place], Louise Mindy, DO    Office Visit    10 months ago Acute nonintractable headache, unspecified headache type    [facility], [address], [place], Jackelin Guillen, DO    Office Visit    1 year ago Moderate persistent extrinsic asthma without complication    [facility], [address] [place] [address] Phyllistine Nurse, MD    Office Visit                      Passed - CMP or BMP in past 6 months     Recent Results (from the past 4392 hour(s))   COMP METABOLIC PANEL (14)    Collection Time: 04/29/23 11:32 AM   Result Value Ref Range    Glucose 87 70 - 99 mg/dL    Sodium 140 136 - 145 mmol/L    Potassium 4.1 3.5 - 5.1 mmol/L    Chloride 105 98 - 112 mmol/L    CO2 29.0 21.0 - 32.0 mmol/L    Anion Gap 6 0 - 18 mmol/L    BUN 17 7 - 18 mg/dL    Creatinine 0.70 0.70 - 1.30 mg/dL    BUN/CREA Ratio 24.3 (H) 10.0 - 20.0    Calcium, Total 9.1 8.5 - 10.1 mg/dL    Calculated Osmolality 291 275 - 295 mOsm/kg    eGFR-Cr 119 >=60 mL/min/1.73m2    ALT 43 16 - 61 U/L    AST 22 15 - 37 U/L    Alkaline Phosphatase 59 45 - 117 U/L Bilirubin, Total 0.4 0.1 - 2.0 mg/dL    Total Protein 7.2 6.4 - 8.2 g/dL    Albumin 4.0 3.4 - 5.0 g/dL    Globulin  3.2 2.8 - 4.4 g/dL    A/G Ratio 1.3 1.0 - 2.0    Patient Fasting for CMP? Yes      *Note: Due to a large number of results and/or encounters for the requested time period, some results have not been displayed. A complete set of results can be found in Results Review.                  Passed - In person appointment or virtual visit in the past 6 months     Recent Outpatient Visits              1 month ago Routine general medical examination at a health care facility    [address] [place], [place] [address] [place], [address]    Office Visit    5 months ago Right buttock pain    [facility], [address], [place], DO    Office Visit    5 months ago Greater trochanteric bursitis, unspecified laterality    [facility], [address], [place], DO    Office Visit    10 months ago Acute nonintractable headache, unspecified headache type    [facility], [address], [place], DO    Office Visit    1 year ago Moderate persistent extrinsic asthma without complication    [facility], [address] [place] [address], Drew Muir MD    Office Visit                      Passed - EGFRCR or GFRNAA > 50     GFR Evaluation  EGFRCR: 119 , resulted on 4/29/2023                 Recent Outpatient Visits              1 month ago Routine general medical examination at a health care facility    [address] [place], [address]    Office Visit    5 months ago Right buttock pain    [facility] [place]    Office Visit    5 months ago Greater trochanteric bursitis, unspecified laterality    [facility], [address], [place] Chan, DO    Office Visit    10 months ago Acute nonintractable headache, unspecified headache type    [facility], [address], [place] Lianne Torres DO    Office Visit    1 year ago Moderate persistent extrinsic asthma without complication    J[facility] Abe[facility] [place] Soto MD    Office Visit

## 2023-07-01 RX ORDER — ALBUTEROL SULFATE 90 UG/1
2 AEROSOL, METERED RESPIRATORY (INHALATION) EVERY 4 HOURS PRN
Qty: 8.5 G | Refills: 3 | Status: SHIPPED | OUTPATIENT
Start: 2023-07-01

## 2023-07-15 ENCOUNTER — PATIENT MESSAGE (OUTPATIENT)
Dept: FAMILY MEDICINE CLINIC | Facility: CLINIC | Age: 43
End: 2023-07-15

## 2023-07-15 NOTE — TELEPHONE ENCOUNTER
From: Piedad Spangler  To: Katherine Sherman DO  Sent: 7/15/2023 10:34 AM CDT  Subject: Refill    I need a refill on the vitamin d

## 2023-07-15 NOTE — TELEPHONE ENCOUNTER
Abnormal vitamin D level. I am going to place you on a prescription form to take weekly for 3 months. Then get retested.    Written by Katherine Sherman DO on 5/9/2023  9:03 AM CDT

## 2023-08-12 ENCOUNTER — TELEPHONE (OUTPATIENT)
Dept: FAMILY MEDICINE CLINIC | Facility: CLINIC | Age: 43
End: 2023-08-12

## 2023-08-12 NOTE — TELEPHONE ENCOUNTER
Appt made for Monday       Is there anyway to get a appointment with a doctor tomorrow, I woke up last night grasping for air , i feel like my airways or closing , I feel like it must be from acidity foods or drinks , I feel like all my oxygen goes to my brain,

## 2023-08-14 ENCOUNTER — OFFICE VISIT (OUTPATIENT)
Dept: FAMILY MEDICINE CLINIC | Facility: CLINIC | Age: 43
End: 2023-08-14

## 2023-08-14 VITALS
BODY MASS INDEX: 38.51 KG/M2 | HEIGHT: 69 IN | DIASTOLIC BLOOD PRESSURE: 82 MMHG | SYSTOLIC BLOOD PRESSURE: 153 MMHG | OXYGEN SATURATION: 96 % | WEIGHT: 260 LBS | HEART RATE: 88 BPM | RESPIRATION RATE: 22 BRPM

## 2023-08-14 DIAGNOSIS — K21.00 GASTROESOPHAGEAL REFLUX DISEASE WITH ESOPHAGITIS WITHOUT HEMORRHAGE: Primary | ICD-10-CM

## 2023-08-14 DIAGNOSIS — G47.9 SLEEP DISTURBANCE: ICD-10-CM

## 2023-08-14 DIAGNOSIS — L30.9 DERMATITIS: ICD-10-CM

## 2023-08-14 PROCEDURE — 3079F DIAST BP 80-89 MM HG: CPT | Performed by: FAMILY MEDICINE

## 2023-08-14 PROCEDURE — 99214 OFFICE O/P EST MOD 30 MIN: CPT | Performed by: FAMILY MEDICINE

## 2023-08-14 PROCEDURE — 3008F BODY MASS INDEX DOCD: CPT | Performed by: FAMILY MEDICINE

## 2023-08-14 PROCEDURE — 3077F SYST BP >= 140 MM HG: CPT | Performed by: FAMILY MEDICINE

## 2023-08-14 RX ORDER — CETIRIZINE HYDROCHLORIDE 10 MG/1
10 TABLET ORAL EVERY MORNING
Qty: 90 TABLET | Refills: 3 | Status: SHIPPED | OUTPATIENT
Start: 2023-08-14

## 2023-08-14 RX ORDER — PANTOPRAZOLE SODIUM 40 MG/1
40 TABLET, DELAYED RELEASE ORAL
Qty: 30 TABLET | Refills: 0 | Status: SHIPPED | OUTPATIENT
Start: 2023-08-14

## 2023-08-14 RX ORDER — ALBUTEROL SULFATE 90 UG/1
2 AEROSOL, METERED RESPIRATORY (INHALATION) EVERY 4 HOURS PRN
Qty: 8.5 G | Refills: 5 | Status: SHIPPED | OUTPATIENT
Start: 2023-08-14

## 2023-08-14 RX ORDER — TRIAMCINOLONE ACETONIDE 1 MG/G
CREAM TOPICAL 2 TIMES DAILY PRN
Qty: 60 G | Refills: 1 | Status: SHIPPED | OUTPATIENT
Start: 2023-08-14

## 2023-08-14 NOTE — TELEPHONE ENCOUNTER
Refill requested for   Requested Prescriptions   Pending Prescriptions Disp Refills    cetirizine 10 MG Oral Tab 90 tablet 0     Sig: Take 1 tablet (10 mg total) by mouth every morning. Antihistamines Failed - 8/12/2023 10:21 PM        Failed - Appt in past 12 mos or next 1 mos     Recent Outpatient Visits              4 months ago Routine general medical examination at a [facility] facility    [address], [address] [place] [address]    Office Visit    8 months ago Right buttock pain    [facility], [address], [place], [place], DO    Office Visit    8 months ago Greater trochanteric bursitis, unspecified laterality    [facility], [address], [place], [place], DO    Office Visit    1 year ago Acute nonintractable headache, unspecified headache type    [facility], [address], [place], [place], DO    Office Visit    1 year ago Moderate persistent extrinsic asthma without complication    Devan Martin MD    Office Visit          Future Appointments         Provider Department Appt Notes    Today Nathalie Vázquez, DO [address], [facility] resp issues on/off wakes up gasping for air                   Last office visit: 05/23/2022    Previously advised to follow up in No follow-up timeline advised in last office visit. Diagnosis of asthma advised to follow-up in 6 months. Diagnosis of AR advised to follow-up in 1 year    F/U currently scheduled?  Not at this time      ACTION:

## 2023-08-14 NOTE — TELEPHONE ENCOUNTER
Refill passed per [facility] protocol. 4 months ago Mary Lutz DO [facility], [address] [facility] Routine general medical examination at a health care facility       Requested Prescriptions   Pending Prescriptions Disp Refills    albuterol 108 (90 Base) MCG/ACT Inhalation Aero Soln 8.5 g 3     Sig: Inhale 2 puffs into the lungs every 4 (four) hours as needed for Wheezing.        Asthma & COPD Medication Protocol Failed - 8/12/2023 10:22 PM        PASSED - In person appointment or virtual visit in the past 6 mos or appointment in next 3 mos     Recent Outpatient Visits              4 months ago Routine general medical examination at a health care facility    Jackie Garza, [address] [place], [place]    Office Visit    8 months ago Right buttock pain    [facility], [address], [place],     Office Visit    8 months ago Greater trochanteric bursitis, unspecified laterality    [facility], [address], [place] qu[place],     Office Visit    1 year ago Acute nonintractable headache, unspecified headache type    [facility], [address], [place],     Office Visit    1 year ago Moderate persistent extrinsic asthma without complication    Gume Dangelo [place] Pyle MD    Office Visit          Future Appointments         Provider Department Appt Notes    Today DO Jackie Joe [place] resp issues on/off wakes up gasping for air                  Future Appointments         Provider Department Appt Notes    Today [facility]utz [facility] on/off wakes up gasping for air          Recent Outpatient Visits              4 months ago Routine general medical examination at a health care facility Devan Alejandro [place], [place]    Office Visit    8 months ago Right buttock pain    [facility], [address], [place], Shelba Ripa, DO    Office Visit    8 months ago Greater trochanteric bursitis, unspecified laterality    [facility], [address], [place], Shelba Ripa, DO    Office Visit    1 year ago Acute nonintractable headache, unspecified headache type    [facility] [address], [place], [facility], DO    Office Visit    1 year ago Moderate persistent extrinsic asthma without complication    Devan Alejandro MD    Office Visit

## 2023-08-16 ENCOUNTER — TELEPHONE (OUTPATIENT)
Dept: FAMILY MEDICINE CLINIC | Facility: CLINIC | Age: 43
End: 2023-08-16

## 2023-08-16 ENCOUNTER — PATIENT MESSAGE (OUTPATIENT)
Dept: FAMILY MEDICINE CLINIC | Facility: CLINIC | Age: 43
End: 2023-08-16

## 2023-08-16 NOTE — TELEPHONE ENCOUNTER
See below  Please advise  No encounter found with norco refill.   Norco not on patients medication list

## 2023-08-16 NOTE — TELEPHONE ENCOUNTER
Patient came into office to  a prescription for Narco, stating  prescribed it to him and received a My Chart message informing him that it was ready for him to . There is no prescription in file folder at the  and there is no telephone encounter recorder regarding My Chart message. Patient was upset and requested a number to complain, Patient Experience number was given. Also stated that he had an appt with Dr. Vallejo Favorite the day before and that he also told him that the prescription was in [address] [place] [address] office ready for him to . Provider not in office today 8/16  Please advise.

## 2023-08-17 RX ORDER — CETIRIZINE HYDROCHLORIDE 10 MG/1
10 TABLET ORAL EVERY MORNING
Qty: 90 TABLET | Refills: 0 | OUTPATIENT
Start: 2023-08-17

## 2023-08-17 NOTE — TELEPHONE ENCOUNTER
Patient sent [employer] message to Dr. Ellie Moralez regarding Argenis Junior RN   to [facility]      8/16/23 10:30 PM  Xavi Cortez,     This medication has not been prescribed in over a year. You would need an office visit to discuss your symptoms and treatment options. Our office is currently closed. If you need assistance while our office is closed, here are your choices for care:   Go to the ER or call 911 for assistance if you believe you are experiencing a medical emergency  Call [phone], to be connected to the answering service, who can page the on-call provider. Visit one of our walk in clinics or immediate care sites. You can see the wait times and hours of operation at this link:  [email or web address]     If you prefer to wait until tomorrow, please call our nurse line to discuss your symptoms,  A phone call will allow our nurses to ask more questions and collect information about your situation so they can give you the best advice on how to proceed. Our office number is [phone], option #2 for a nurse to assist you. Our phone hours are:  Monday - Friday          8 AM - 4:30 PM  Saturday                      8 AM - 12 PM  Sunday                        Closed     Thank you,  Marty Payton RN    This [employer] message has not been read.

## 2023-08-18 ENCOUNTER — NURSE TRIAGE (OUTPATIENT)
Dept: FAMILY MEDICINE CLINIC | Facility: CLINIC | Age: 43
End: 2023-08-18

## 2023-08-18 DIAGNOSIS — R30.0 DYSURIA: Primary | ICD-10-CM

## 2023-08-18 NOTE — TELEPHONE ENCOUNTER
Action Requested: Summary for Provider     []  Critical Lab, Recommendations Needed  [x] Need Additional Advice  []   FYI    [x]   Need Orders  [] Need Medications Sent to Pharmacy  []  Other     SUMMARY: Patient stated that he has been having urinary frequency/urgency, pain/burning with urination for over a 1 week now. Thought symptoms were getting better and forgot to mention it at appointment with Dr Joe Williamson on 8/14/2023. Patient drinking a lot of fluids. Feels pelvic discomfort. Denies any concern on STD's. Always has back pain. No fevers. No other symptoms. No appointments available today. Patient wanted to know if doctor could order urine testing to see if he has a urinary tract infection? Please advise.    Reason for call: Urinary Symptoms (Urinary frequency/urgency, pain/burning with urination)  Onset: Aug 9, 2023  Reason for Disposition   Side (flank) or lower back pain present    Protocols used: Urination Pain - Male-A-[place]

## 2023-08-19 ENCOUNTER — LAB ENCOUNTER (OUTPATIENT)
Dept: LAB | Age: 43
End: 2023-08-19
Attending: FAMILY MEDICINE
Payer: COMMERCIAL

## 2023-08-19 DIAGNOSIS — R79.89 LOW VITAMIN D LEVEL: ICD-10-CM

## 2023-08-19 LAB
BILIRUB UR QL: NEGATIVE
CLARITY UR: CLEAR
GLUCOSE UR-MCNC: NORMAL MG/DL
HGB UR QL STRIP.AUTO: NEGATIVE
HYALINE CASTS #/AREA URNS AUTO: PRESENT /LPF
KETONES UR-MCNC: NEGATIVE MG/DL
LEUKOCYTE ESTERASE UR QL STRIP.AUTO: NEGATIVE
NITRITE UR QL STRIP.AUTO: NEGATIVE
PH UR: 5.5 [PH] (ref 5–8)
PROT UR-MCNC: 50 MG/DL
SP GR UR STRIP: 1.02 (ref 1–1.03)
UROBILINOGEN UR STRIP-ACNC: NORMAL
VIT D+METAB SERPL-MCNC: 24.2 NG/ML (ref 30–100)

## 2023-08-19 PROCEDURE — 81001 URINALYSIS AUTO W/SCOPE: CPT | Performed by: FAMILY MEDICINE

## 2023-08-19 PROCEDURE — 82306 VITAMIN D 25 HYDROXY: CPT

## 2023-08-19 PROCEDURE — 36415 COLL VENOUS BLD VENIPUNCTURE: CPT

## 2023-08-19 NOTE — TELEPHONE ENCOUNTER
Dr. Viktor Gonzalez pended. Last visit 8/14/23 and 3/27/23  Please advise       In years past, Dominique Pacer has been ordered by Dr. Red Ayoub and Dr. Ish Mendez.

## 2023-08-22 ENCOUNTER — PATIENT MESSAGE (OUTPATIENT)
Dept: FAMILY MEDICINE CLINIC | Facility: CLINIC | Age: 43
End: 2023-08-22

## 2023-08-24 ENCOUNTER — PATIENT MESSAGE (OUTPATIENT)
Dept: FAMILY MEDICINE CLINIC | Facility: CLINIC | Age: 43
End: 2023-08-24

## 2023-08-25 ENCOUNTER — PATIENT MESSAGE (OUTPATIENT)
Dept: FAMILY MEDICINE CLINIC | Facility: CLINIC | Age: 43
End: 2023-08-25

## 2023-08-25 NOTE — TELEPHONE ENCOUNTER
Normal UA . He would benefit from taking vitamin D3 otc 2,000 units daily. These were not critical tests that would impact his life at this time.

## 2023-08-28 RX ORDER — HYDROCODONE BITARTRATE AND ACETAMINOPHEN 10; 325 MG/1; MG/1
1 TABLET ORAL EVERY 6 HOURS PRN
Qty: 30 TABLET | Refills: 0 | Status: SHIPPED | OUTPATIENT
Start: 2023-08-28

## 2023-08-30 RX ORDER — HYDROCODONE BITARTRATE AND ACETAMINOPHEN 10; 325 MG/1; MG/1
1 TABLET ORAL EVERY 6 HOURS PRN
Qty: 30 TABLET | Refills: 0 | Status: CANCELLED | OUTPATIENT
Start: 2023-08-30

## 2023-09-02 RX ORDER — HYDROCODONE BITARTRATE AND ACETAMINOPHEN 10; 325 MG/1; MG/1
1 TABLET ORAL EVERY 6 HOURS PRN
Qty: 30 TABLET | Refills: 0 | OUTPATIENT
Start: 2023-09-02

## 2023-09-02 NOTE — TELEPHONE ENCOUNTER
[facility] Krish[facility] Sent 8/28/23  Pended for [facility] in [place], please advise      Lis[place]         Patient called office. Date of birth and full name both confirmed.

## 2023-09-06 RX ORDER — HYDROCODONE BITARTRATE AND ACETAMINOPHEN 10; 325 MG/1; MG/1
1 TABLET ORAL EVERY 6 HOURS PRN
Qty: 30 TABLET | Refills: 0 | Status: SHIPPED | OUTPATIENT
Start: 2023-09-06

## 2023-09-06 NOTE — TELEPHONE ENCOUNTER
Patient called back to inquire about resending [place] [facility] to pharmacy that has medication in stock. He has been waiting for several days. Dr. Salinas Mcrae off today, will route to in office provider Dr. Radford.

## 2023-09-18 ENCOUNTER — OFFICE VISIT (OUTPATIENT)
Dept: SLEEP CENTER | Age: 43
End: 2023-09-18
Attending: FAMILY MEDICINE
Payer: COMMERCIAL

## 2023-09-18 DIAGNOSIS — K21.00 GASTROESOPHAGEAL REFLUX DISEASE WITH ESOPHAGITIS WITHOUT HEMORRHAGE: ICD-10-CM

## 2023-09-18 DIAGNOSIS — G47.9 SLEEP DISTURBANCE: ICD-10-CM

## 2023-09-18 PROCEDURE — 95806 SLEEP STUDY UNATT&RESP EFFT: CPT

## 2023-09-18 RX ORDER — LOSARTAN POTASSIUM 100 MG/1
100 TABLET ORAL DAILY
Qty: 90 TABLET | Refills: 0 | Status: SHIPPED | OUTPATIENT
Start: 2023-09-18

## 2023-09-19 RX ORDER — ALBUTEROL SULFATE 90 UG/1
2 AEROSOL, METERED RESPIRATORY (INHALATION) EVERY 4 HOURS PRN
Qty: 8.5 G | Refills: 5 | Status: SHIPPED | OUTPATIENT
Start: 2023-09-19

## 2023-09-19 NOTE — TELEPHONE ENCOUNTER
Please review. Protocol failed / No Protocol. Requested Prescriptions   Pending Prescriptions Disp Refills    albuterol 108 (90 Base) MCG/ACT Inhalation Aero Soln 8.5 g 5     Sig: Inhale 2 puffs into the lungs every 4 (four) hours as needed for Wheezing.        Asthma & COPD Medication Protocol Failed - 9/18/2023  2:44 PM        Failed - In person appointment or virtual visit in the past 6 mos or appointment in next 3 mos     Recent Outpatient Visits              Yesterday Gastroesophageal reflux disease with esophagitis without hemorrhage    [address] [facility]    Office Visit    1 month ago Gastroesophageal reflux disease with esophagitis without hemorrhage    [facility], [address], [place]    Office Visit    5 months ago Routine general medical examination at a health care facility    [address], [place]to [place], [address], [place]    Office Visit    9 months ago Right buttock pain    [facility], [facility], [place], Yanet Connelly,     Office Visit    9 months ago Greater trochanteric bursitis, unspecified laterality    [address] [facility], [facility], Yanet Connelly, DO    Office Visit

## 2023-09-25 ENCOUNTER — PATIENT MESSAGE (OUTPATIENT)
Dept: FAMILY MEDICINE CLINIC | Facility: CLINIC | Age: 43
End: 2023-09-25

## 2023-09-26 NOTE — TELEPHONE ENCOUNTER
Patient calling for an update, states his sleep study results are in and has not heard back from Dr. Justyna Shaikh. Patient is still dealing with the shortnes of breath at night. Patient would like to know if a CPAP will be needed. Please advise.

## 2023-09-29 DIAGNOSIS — G47.33 OSA (OBSTRUCTIVE SLEEP APNEA): Primary | ICD-10-CM

## 2023-10-03 ENCOUNTER — TELEPHONE (OUTPATIENT)
Dept: FAMILY MEDICINE CLINIC | Facility: CLINIC | Age: 43
End: 2023-10-03

## 2023-10-03 DIAGNOSIS — G47.33 OSA (OBSTRUCTIVE SLEEP APNEA): Primary | ICD-10-CM

## 2023-10-03 NOTE — TELEPHONE ENCOUNTER
Dr Pia Daigle:    Patient asking if you can order APAP instead. Patient had sleep study done 9/20/23. He will need cpap titration; he states he cannot sleep at [address] [place] [address]. He has a very hard time and failed last time he tried. The sleep center suggested an APAP. He is not sure if he needs to see pulmonologist; he preferred not to go that route since he won't have appts until March next year. He doesn't want to wait that long. He has been having worsening sleep apnea episodes.

## 2023-10-05 RX ORDER — CETIRIZINE HYDROCHLORIDE 10 MG/1
10 TABLET ORAL EVERY MORNING
Qty: 90 TABLET | Refills: 3 | Status: CANCELLED | OUTPATIENT
Start: 2023-10-05

## 2023-10-05 NOTE — TELEPHONE ENCOUNTER
See [employer] message sent to patient.         Debra Jarrett.      The Allergy Office received a refill request for Cetirizine 10 mg tablets.      Due to Dr. Hernandez's medication refill protocol, we are unable to refill this medication unless you have been seen by Dr. Hernandez within the last year.     Your last visit with Dr. Hernandez was on 5/23/2022.      Once an Allergy Physician Follow-Up appointment is scheduled, Dr. Hernandez will address your refill request.      Please call the Allergy Office at [phone] to schedule an Allergy Physician Follow-Up Appointment, or do so through [employer].     Regards,        Lissa MONTEJO RN          Patient last seen in Allergy 5/23/2022 for . . .    Moderate persistent extrinsic asthma without complication  (primary encounter diagnosis)  Seasonal and perennial allergic rhinoconjunctivitis  Chronic ethmoidal sinusitis  Nasal polyp  Hypertrophy of nasal turbinates  Nasal congestion    Requested Prescriptions   Pending Prescriptions Disp Refills    CETIRIZINE 10 MG Oral Tab [Pharmacy Med Name: CETIRIZINE 10MG TABLETS] 90 tablet 3     Sig: TAKE 1 TABLET BY MOUTH EVERY MORNING       Antihistamines Failed - 10/5/2023  2:48 PM        Failed - Appt in past 12 mos or next 1 mos     Recent Outpatient Visits              2 weeks ago Gastroesophageal reflux disease with esophagitis without hemorrhage    [facility]    Office Visit    1 month ago Gastroesophageal reflux disease with esophagitis without hemorrhage    [facility] Albert Guerrero, DO    Office Visit    6 months ago Routine general medical examination at a health care facility    [facility] Albert Guerrero, DO    Office Visit    9 months ago Right buttock pain    [facility], [place], David B, DO    Office Visit    10 months ago Greater trochanteric bursitis, unspecified laterality     [facility], [place] Chan, DO    Office Visit

## 2023-10-08 NOTE — TELEPHONE ENCOUNTER
Please assist:       Did doctor order the automatic Cpap machine yet I been waiting for along time ,

## 2023-10-09 RX ORDER — CETIRIZINE HYDROCHLORIDE 10 MG/1
10 TABLET ORAL EVERY MORNING
Qty: 90 TABLET | Refills: 3 | OUTPATIENT
Start: 2023-10-09

## 2023-10-16 ENCOUNTER — TELEPHONE (OUTPATIENT)
Dept: FAMILY MEDICINE CLINIC | Facility: CLINIC | Age: 43
End: 2023-10-16

## 2023-10-16 DIAGNOSIS — G47.33 OSA (OBSTRUCTIVE SLEEP APNEA): Primary | ICD-10-CM

## 2023-10-16 NOTE — TELEPHONE ENCOUNTER
New Cpap referral pended:    [place] with [facility] calling regarding referral for Cpap setting only can only be at 4 ( the lowest setting available)    Need to fax: new order along with Office notes prior to sleep study and Sleep study faxed to [phone]

## 2023-10-18 ENCOUNTER — TELEPHONE (OUTPATIENT)
Dept: FAMILY MEDICINE CLINIC | Facility: CLINIC | Age: 43
End: 2023-10-18

## 2023-10-18 DIAGNOSIS — G47.33 OSA (OBSTRUCTIVE SLEEP APNEA): Primary | ICD-10-CM

## 2023-10-18 NOTE — TELEPHONE ENCOUNTER
Rosa- [facility] calling to request an updated prescription for patients C-pap with correct settings. She states that the machines minimum setting is 4.  She is also requesting face to face notes prior to sleep study done on 9/18/2023  Fax# [phone]

## 2023-10-18 NOTE — TELEPHONE ENCOUNTER
Therese Varghese, JACKELINE KEITH    10/17/23  5:53 PM  Note  Orders with insurance f2f notes faxed successfully to e

## 2023-10-23 ENCOUNTER — PATIENT MESSAGE (OUTPATIENT)
Dept: FAMILY MEDICINE CLINIC | Facility: CLINIC | Age: 43
End: 2023-10-23

## 2023-10-23 NOTE — TELEPHONE ENCOUNTER
Eusebia Rivera from [facility] is calling to request a new prescription with correct pressure setting and face to face notes.     [phone]

## 2023-10-23 NOTE — TELEPHONE ENCOUNTER
Verified name and . Patient returning phone call. He states he is frustrated that obtaining an autopap machine has been so difficult. He had his sleep study ordered by Dr. Ken Suárez completed on 23 and still has not been able to get the appropriate equipment. He states that he already spoke with the sleep center about how he cannot go in for a sleep titration study and that he does not need to do the sleep titration study because Dr. Ken Suárez ordered an autopap which will automatically adjust to the settings that he requires. He states that this order \"has nothing to do with Dr. Latrice Rojas". From order placed by Dr. Ken Suárez on 10/10/23:  Type Date User Summary Attachment   Provider Comments 10/10/2023  9:18 AM Faustino Benedict DO Provider Comments -   Note:  Autopap with settings at 3/15 . Humidified. Mask, tubing , filters. Also see condition update encounter 10/3/23 for reference on how it had been discussed with Dr. Ken Suárez that sleep titration study cannot be completed and therefore, Dr. Ken Suárez ordered Autopap. Patient states that he has spoken with Samira Granados multiple times who have advised him that the office keeps sending the orders incorrectly and that Samira SosaBuffalo continues to attempt to reach out to the office with no success. Patient states he has explained this to multiple individuals at this office various times that [facility] is requesting that an office staff member reach out to them so that information on how to send over the correct order and supporting information can be clarified to allow the order to be processed. Triage support, please reach out to Samira SosaBuffalo to obtain specifications of what needs to be corrected in the order for the Autopap so that this can be resolved for the patient. Patient is requesting a phone call back with an update. Thank you.

## 2023-10-23 NOTE — TELEPHONE ENCOUNTER
Called patient & reminded him of test needed to determine settings for Cpap & type of mask. Order placed 9/29/23. Sent patient [employer] message also.

## 2023-10-26 RX ORDER — HYDROCODONE BITARTRATE AND ACETAMINOPHEN 10; 325 MG/1; MG/1
1 TABLET ORAL EVERY 6 HOURS PRN
Qty: 30 TABLET | Refills: 0 | OUTPATIENT
Start: 2023-10-26

## 2023-10-26 NOTE — TELEPHONE ENCOUNTER
Please review; protocol failed.   Requested Prescriptions   Pending Prescriptions Disp Refills    HYDROcodone-acetaminophen (NORCO)  MG Oral Tab 30 tablet 0     Sig: Take 1 tablet by mouth every 6 (six) hours as needed.       There is no refill protocol information for this order        Recent Outpatient Visits              1 month ago Gastroesophageal reflux disease with esophagitis without hemorrhage    [facility]    Office Visit    2 months ago Gastroesophageal reflux disease with esophagitis without hemorrhage    [facility], [place] Albert Guerrero, DO    Office Visit    7 months ago Routine general medical examination at a health care facility    [facility] [address], [place] Albert Guerrero, DO    Office Visit    10 months ago Right buttock pain    [facility], [place], David B, DO    Office Visit    10 months ago Greater trochanteric bursitis, unspecified laterality    [facility], [place], David B, DO    Office Visit

## 2023-10-28 ENCOUNTER — TELEPHONE (OUTPATIENT)
Dept: FAMILY MEDICINE CLINIC | Facility: CLINIC | Age: 43
End: 2023-10-28

## 2023-10-28 NOTE — TELEPHONE ENCOUNTER
Action Requested: Summary for Provider     []  Critical Lab, Recommendations Needed  [] Need Additional Advice  [x]   FYI    []   Need Orders  [] Need Medications Sent to Pharmacy  []  Other     SUMMARY: Going to Walk in Clinic     Reason for call: Acute  Onset: Today    Patient called office. Date of birth and full name both confirmed. Productive cough  Chest feels very tight   Using rescue Inhaler often-  every hour, per patient. Triaged per protocol and advised care advice per protocol. Evaluation advised in ER or Immediate Care. He wants to avoid Immediate Care - says they did not do anything for him last time. RN advised treatment is based on evaluation of current status - may receive different treatment. He understands, but still declines ER/IC. Advised walk in clinic - no other FM/IM openings today. He verbalizes understanding of all information, and agreeable to plan. Pcp Order change placed.  Patient says Dr. Frankey Pall is his Primary Care Doctor

## 2023-10-29 ENCOUNTER — PATIENT MESSAGE (OUTPATIENT)
Dept: FAMILY MEDICINE CLINIC | Facility: CLINIC | Age: 43
End: 2023-10-29

## 2023-10-29 NOTE — TELEPHONE ENCOUNTER
From: Maegan Villa  To: Megan Yi  Sent: 10/29/2023 11:05 AM CDT  Subject: Sick    I am having phlegm yellow , coughing and my chest is tight , I am using inhaler a lot more then should because I feel it in my chest , my right side of chest hurts .

## 2023-10-30 ENCOUNTER — HOSPITAL ENCOUNTER (OUTPATIENT)
Dept: GENERAL RADIOLOGY | Age: 43
Discharge: HOME | End: 2023-10-30
Attending: FAMILY MEDICINE
Payer: COMMERCIAL

## 2023-10-30 ENCOUNTER — OFFICE VISIT (OUTPATIENT)
Dept: FAMILY MEDICINE CLINIC | Facility: CLINIC | Age: 43
End: 2023-10-30

## 2023-10-30 VITALS
HEIGHT: 69 IN | SYSTOLIC BLOOD PRESSURE: 136 MMHG | RESPIRATION RATE: 15 BRPM | WEIGHT: 266 LBS | DIASTOLIC BLOOD PRESSURE: 70 MMHG | BODY MASS INDEX: 39.4 KG/M2 | HEART RATE: 89 BPM | OXYGEN SATURATION: 98 %

## 2023-10-30 DIAGNOSIS — R05.9 COUGH, UNSPECIFIED TYPE: ICD-10-CM

## 2023-10-30 DIAGNOSIS — R05.1 ACUTE COUGH: ICD-10-CM

## 2023-10-30 DIAGNOSIS — R05.1 ACUTE COUGH: Primary | ICD-10-CM

## 2023-10-30 PROCEDURE — 3008F BODY MASS INDEX DOCD: CPT | Performed by: FAMILY MEDICINE

## 2023-10-30 PROCEDURE — 99213 OFFICE O/P EST LOW 20 MIN: CPT | Performed by: FAMILY MEDICINE

## 2023-10-30 PROCEDURE — 3078F DIAST BP <80 MM HG: CPT | Performed by: FAMILY MEDICINE

## 2023-10-30 PROCEDURE — 71046 X-RAY EXAM CHEST 2 VIEWS: CPT | Performed by: FAMILY MEDICINE

## 2023-10-30 PROCEDURE — 3075F SYST BP GE 130 - 139MM HG: CPT | Performed by: FAMILY MEDICINE

## 2023-10-30 RX ORDER — AMOXICILLIN 875 MG/1
875 TABLET, COATED ORAL 2 TIMES DAILY
Qty: 20 TABLET | Refills: 0 | Status: SHIPPED | OUTPATIENT
Start: 2023-10-30 | End: 2023-11-09

## 2023-10-30 RX ORDER — CODEINE PHOSPHATE AND GUAIFENESIN 10; 100 MG/5ML; MG/5ML
10 SOLUTION ORAL EVERY 6 HOURS PRN
Qty: 236 ML | Refills: 0 | Status: SHIPPED | OUTPATIENT
Start: 2023-10-30

## 2023-10-30 NOTE — PROGRESS NOTES
Blood pressure 136/70, pulse 89, resp. rate 15, height 5' 9\" (1.753 m), weight 266 lb (120.7 kg). 3-day history of cough that is nocturnal yellow phlegm and nasal congestion. No dyspnea at this time. Denies any sore throat fever or chills. No sneezing. Some watery eyes no itchy eyes no ear pain. Has not tried any over-the-counter medications. Denies bad breath or tooth pain he does have sinus pressure. Has a history of asthma does not smoke. Objective lungs clear to auscultation no rales rhonchi wheezes    Throat erythematous no exudate    Assessment #1 sinusitis    With cough    History of asthma    Plan stat chest x-ray    Amoxicillin prescription    Also Cheratussin AC risks and benefits explained    MEDICATION MAY CAUSE DROWSINESS. DO NOT DRIVE OR OPERATE HEAVY MACHINERY. GO TO ER IF SYMPTOMS PERSIST OR WORSEN.    COVID swab sent

## 2023-10-30 NOTE — TELEPHONE ENCOUNTER
Patient returned our call ( Identified name and  )  states he does have appointment today   Informed mask is required for building entry and appointment. Patient verbalizes understanding and agrees with plan.        Future Appointments   Date Time Provider Samira Pruitt   10/30/2023  6:00 PM Samson Bran DO [facility] [place]

## 2023-10-30 NOTE — TELEPHONE ENCOUNTER
Advised patient of Dr Gladys James note. He has an appointment with Dr. Cecy Moore.      Future Appointments   Date Time Provider Saimra Pruitt   10/30/2023  6:00 PM Pascual Tierney DO [facility] [place]

## 2023-10-31 ENCOUNTER — PATIENT MESSAGE (OUTPATIENT)
Dept: FAMILY MEDICINE CLINIC | Facility: CLINIC | Age: 43
End: 2023-10-31

## 2023-10-31 LAB — SARS-COV-2 RNA RESP QL NAA+PROBE: NOT DETECTED

## 2023-11-01 NOTE — TELEPHONE ENCOUNTER
Please review; protocol failed.   Requested Prescriptions   Pending Prescriptions Disp Refills    HYDROcodone-acetaminophen (NORCO)  MG Oral Tab 30 tablet 0     Sig: Take 1 tablet by mouth every 6 (six) hours as needed.       There is no refill protocol information for this order        Recent Outpatient Visits              2 days ago Acute cough    [facility] [place]    Office Visit    1 month ago Gastroesophageal reflux disease with esophagitis without hemorrhage    [facility]    Office Visit    2 months ago Gastroesophageal reflux disease with esophagitis without hemorrhage    [facility] [place]    Office Visit    7 months ago Routine general medical examination at a health care facility    [facility] [place]    Office Visit    10 months ago Right buttock pain    [facility] [place]    Office Visit

## 2023-11-02 RX ORDER — ALBUTEROL SULFATE 90 UG/1
2 AEROSOL, METERED RESPIRATORY (INHALATION) EVERY 4 HOURS PRN
Qty: 8.5 G | Refills: 5 | OUTPATIENT
Start: 2023-11-02

## 2023-11-02 RX ORDER — HYDROCODONE BITARTRATE AND ACETAMINOPHEN 10; 325 MG/1; MG/1
1 TABLET ORAL EVERY 6 HOURS PRN
Qty: 30 TABLET | Refills: 0 | OUTPATIENT
Start: 2023-11-02

## 2023-11-03 NOTE — TELEPHONE ENCOUNTER
From: Maegan Villa  To: Megan Yi  Sent: 10/31/2023 2:11 PM CDT  Subject: Auto cpap     I been waiting to get the auto cpap machine, I am getting calls from 2 different providers that said the doctor sent the order into, I am confused on what company is providing me with the machine is it [employer] or [address] [place] [address] because both companies are calling me about the machine can you please tell me which company I should be dealing with ,

## 2023-11-06 ENCOUNTER — PATIENT MESSAGE (OUTPATIENT)
Dept: FAMILY MEDICINE CLINIC | Facility: CLINIC | Age: 43
End: 2023-11-06

## 2023-11-06 ENCOUNTER — TELEPHONE (OUTPATIENT)
Dept: FAMILY MEDICINE CLINIC | Facility: CLINIC | Age: 43
End: 2023-11-06

## 2023-11-06 RX ORDER — ALBUTEROL SULFATE 90 UG/1
2 AEROSOL, METERED RESPIRATORY (INHALATION) EVERY 4 HOURS PRN
Qty: 8.5 G | Refills: 4 | Status: SHIPPED | OUTPATIENT
Start: 2023-11-06

## 2023-11-06 RX ORDER — AMOXICILLIN AND CLAVULANATE POTASSIUM 875; 125 MG/1; MG/1
1 TABLET, FILM COATED ORAL 2 TIMES DAILY
Qty: 20 TABLET | Refills: 0 | Status: SHIPPED | OUTPATIENT
Start: 2023-11-06 | End: 2023-11-16

## 2023-11-06 RX ORDER — FLUTICASONE PROPIONATE 50 MCG
2 SPRAY, SUSPENSION (ML) NASAL DAILY
Qty: 1 EACH | Refills: 3 | Status: SHIPPED | OUTPATIENT
Start: 2023-11-06

## 2023-11-06 RX ORDER — BENZONATATE 100 MG/1
100 CAPSULE ORAL 3 TIMES DAILY PRN
Qty: 45 CAPSULE | Refills: 0 | Status: SHIPPED | OUTPATIENT
Start: 2023-11-06

## 2023-11-06 NOTE — TELEPHONE ENCOUNTER
Verified name and . Patient asking that Dr. Jessy Johnson take over prescribing Flonase as he no longer sees Dr. Echo Pantoja.     Medication pended for your review and approval.

## 2023-11-06 NOTE — TELEPHONE ENCOUNTER
Verified name and . Patient calling to follow up refill request for Albuterol- he was advised per refill request encounter 10/31/23 that prescription was declined because there are existing refills at pharmacy. He states that pharmacy advised him that there are no refills on file. This RN sent prescription for existing refills.

## 2023-11-06 NOTE — TELEPHONE ENCOUNTER
From: Theresa Strauss  To: Man Chan  Sent: 11/6/2023 9:14 AM CST  Subject: Follow up    My symptoms are not getting better with the antibiotics that you gave me, still coughing a lot with yellow phlegm , and shortness of breath because of coughing so much using rescue inhaler , anyother options on what to do

## 2023-11-06 NOTE — TELEPHONE ENCOUNTER
Refill requested for    Name from pharmacy: FLUTICASONE 50MCG NASAL SP (120) RX         Will file in chart as: FLUTICASONE PROPIONATE 50 MCG/ACT Nasal Suspension    The original prescription was discontinued on 5/26/2022 by Baldev Gar MD. Renewing this prescription may not be appropriate. Sig: SHAKE LIQUID AND USE 2 SPRAYS IN EACH NOSTRIL DAILY    Disp: 48 g    Refills: 0 (Pharmacy requested: Not specified)    Start: 11/5/2023    Class: Normal    Non-formulary    Last ordered: 1 year ago (5/23/2022) by Thi Cohen MD    Last refill: 8/12/2023    Rx #: [id number]    Antihistamines [id number]/05/2023 12:46 PM   Protocol Details Appt in past 12 mos or next 1 mos      To be filled at: [place] [address], [address] [place] [address], [facility] AT [place] [address] [phone], [phone]     Last office visit: 05/23/2022    Previously advised to follow up in Chronic sinusitis one year     F/U currently scheduled?  Not at this time     ACTION: [employer] message sent to patient he is overdue for follow up

## 2023-11-06 NOTE — TELEPHONE ENCOUNTER
Patient calling back (identified name and ) states that he does not need another cough medication. States the Amoxicillin did not work in clearing up his sinus congestion and has not worked for him in the past. He is still having think yellowish secretions with sinus pressure and congestion. States his cough is mainly due to the post nasal drip. Requesting another antibiotic. Please advise. [place]

## 2023-11-06 NOTE — TELEPHONE ENCOUNTER
Patient called upset that a previous call was disconnected, per patient it might have been hung up on. Patient is not getting better. Patient saw Yolanda Brookesmith on 10-30 and was prescribed cough syrup, antibiotic and flonase. None of these are giving him relief. Asked patient if he'd like to come back in for follow up appointment, patient denied wanting to come in, stated he is not going to come back in-he should not have to per patient. When starting to type TE for patient explaing that the medications above were not working, when I included the Flonase- patient asked if I was stupid, that none of us know what we are doing here. .. I told patient that I am reading all the messages from earlier in the day and from what he had said this is what I understood, I asked if he would like Dr. Asuncion Call to call him back to discuss- he denied wanting Dr Asuncion Call to call him back- patient asked for a nurse to talk to. Transferred patient to nurse in triage, explained all to nurse in triage.

## 2023-11-06 NOTE — TELEPHONE ENCOUNTER
Verified name and . Patient was seen in office on 10/30/23 with Dr. Gibran Caldwell for acute cough. He states he continues to have symptoms of cough and sinus congestion with no improvement. He states that prescribed cough medicine helps with cough overnight but that he cannot take the medication during the day due to codeine component. He asks that message be sent to Dr. Gibran Caldwell for further recommendations on ongoing symptoms.

## 2023-11-06 NOTE — TELEPHONE ENCOUNTER
Spoke with patient (name and  verified). Dr. Chris Torres' recommendations discussed. Patient verbalized understanding and agrees with plan.

## 2023-11-06 NOTE — TELEPHONE ENCOUNTER
Verified name and . Patient calling regarding Norco refill request.    He was advised that request was denied by Dr. Coleman Rm:     [facility])  MG Oral Tab 30 tablet 0 2023     Request refused: Refill not appropriate    Sig - Route: Take 1 tablet by mouth every 6 (six) hours as needed. - Oral    Earliest Fill Date: 2023        He is asking that message be sent to Dr. Henning Speaks to request refill as he states that Dr. Henning Speaks prescribed it for his ongoing back pain. Please see pended medication.

## 2023-11-07 RX ORDER — HYDROCODONE BITARTRATE AND ACETAMINOPHEN 10; 325 MG/1; MG/1
1 TABLET ORAL EVERY 6 HOURS PRN
Qty: 30 TABLET | Refills: 0 | Status: SHIPPED | OUTPATIENT
Start: 2023-11-07

## 2023-11-08 ENCOUNTER — TELEPHONE (OUTPATIENT)
Dept: FAMILY MEDICINE CLINIC | Facility: CLINIC | Age: 43
End: 2023-11-08

## 2023-11-08 NOTE — TELEPHONE ENCOUNTER
Marycruz Hernandez from [facility] calling to state they need copy of office notes to process order.   8/14/23 OV notes faxed to number provided by Marycruz Hernandez [phone]

## 2023-11-09 ENCOUNTER — PATIENT OUTREACH (OUTPATIENT)
Dept: CASE MANAGEMENT | Age: 43
End: 2023-11-09

## 2023-11-09 NOTE — PROCEDURES
Received order requesting to update PCP to Dr. Pauly Alvarez is Denied and finalized on November 9, 2023. [facility] patient is listed on the [facility] eligibility [facility]:  [facility] Attributed PCP is Dr. Heydi Chaudhry, please contact patient to verify PCP. Inform patient they must contact Concetta Schofield to provide the name of current PCP. [facility] can change their PCP on the Sol Schofield website, by emailing [email or web address]   [facility] Members can call Concetta Schofield customer service at [phone]  [facility] PCP assignment is handled by Concetta Schofield, not [employer] should also mail patient a letter to contact office to schedule an appointment or contact Concetta Schofield to update their PCP.     After office has verified patient is no longer listed on the [place] [facility] monthly eligibility list and not assigned to Dr. Gibran Caldwell then office must resubmit a new order for PCP removal request.    Thanks,  [phone] [employer]

## 2023-11-14 RX ORDER — FLUTICASONE PROPIONATE 50 MCG
2 SPRAY, SUSPENSION (ML) NASAL DAILY
Qty: 48 G | Refills: 0 | OUTPATIENT
Start: 2023-11-14

## 2023-11-14 NOTE — TELEPHONE ENCOUNTER
Refill denied   Patient read ulike message on 11/6/23 that he is overdue for a follow-up appointment  Will need to have one scheduled before refill can be sent

## 2023-12-13 ENCOUNTER — PATIENT MESSAGE (OUTPATIENT)
Dept: FAMILY MEDICINE CLINIC | Facility: CLINIC | Age: 43
End: 2023-12-13

## 2023-12-13 ENCOUNTER — TELEPHONE (OUTPATIENT)
Dept: FAMILY MEDICINE CLINIC | Facility: CLINIC | Age: 43
End: 2023-12-13

## 2023-12-13 RX ORDER — ALBUTEROL SULFATE 90 UG/1
2 AEROSOL, METERED RESPIRATORY (INHALATION) EVERY 4 HOURS PRN
Qty: 18 G | Refills: 0 | Status: SHIPPED | OUTPATIENT
Start: 2023-12-13

## 2023-12-13 RX ORDER — PREDNISONE 20 MG/1
TABLET ORAL
Qty: 12 TABLET | Refills: 0 | Status: SHIPPED | OUTPATIENT
Start: 2023-12-13

## 2023-12-13 RX ORDER — AMOXICILLIN AND CLAVULANATE POTASSIUM 875; 125 MG/1; MG/1
1 TABLET, FILM COATED ORAL 2 TIMES DAILY
Qty: 14 TABLET | Refills: 0 | Status: SHIPPED | OUTPATIENT
Start: 2023-12-13 | End: 2023-12-20

## 2023-12-14 RX ORDER — ALBUTEROL SULFATE 90 UG/1
2 AEROSOL, METERED RESPIRATORY (INHALATION) EVERY 4 HOURS PRN
Qty: 8.5 G | Refills: 4 | OUTPATIENT
Start: 2023-12-14

## 2023-12-14 NOTE — TELEPHONE ENCOUNTER
Received page regarding patient having severe sinus congestion/pain with green/yellow phlegm. Also complains of cough with wheezing. Has used Albuterol inhaler 7 times today. Speaking in full sentences on telephone, no acute respiratory  distress noted. States he is unable to sleep at night due to breathing difficulty   Was seen by Dr. Alba Mota  on 10/30/23 and Amoxicillin and albuterol given, helped for a little while, stopped working. Requesting antibiotic be sent to pharmacy. Augmentin 1 tab twice daily x 7 days and prednisone tapering dose sent to pharmacy. Also requesting refill of albuterol, sent to pharmacy. Advised patient to follow-up in office with Dr. Silvino Elkins to ER for worsening symptoms. 79 Y/O MALE COMES IN VIA EMS FROM [facility] for decubitis ulcer (9x6). Patient has hx of alzheimers, htn, dm, ?coded in dec following PNA and trached since december. patients daughter helps with hx. patient with active cdif started abx since last week. no fever/chills. full code.    [facility]- [facility] -[facility]ashmir suggs

## 2023-12-18 RX ORDER — LOSARTAN POTASSIUM 100 MG/1
100 TABLET ORAL DAILY
Qty: 30 TABLET | Refills: 0 | Status: SHIPPED | OUTPATIENT
Start: 2023-12-18

## 2023-12-18 NOTE — TELEPHONE ENCOUNTER
Please review; protocol failed. No active /future labs noted     Requested Prescriptions   Pending Prescriptions Disp Refills    LOSARTAN 100 MG Oral Tab [Pharmacy Med Name: LOSARTAN 100MG TABLETS] 90 tablet 0     Sig: TAKE 1 TABLET(100 MG) BY MOUTH DAILY. PATIENT NEEDS FASTING BLOOD TEST FOLLOWED BY APPOINTMENT       Hypertensive Medications Protocol Failed - 12/15/2023  4:57 PM        Failed - CMP or BMP in past 6 months     No results found for this or any previous visit (from the past 4392 hour(s)).             Passed - In person appointment in the past 12 or next 3 months     Recent Outpatient Visits              1 month ago [facility]    [facility], [place] PaigeAshwini sharif, DO    Office Visit    3 months ago Gastroesophageal reflux disease with esophagitis without hemorrhage    [address] [facility]    Office Visit    4 months ago Gastroesophageal reflux disease with esophagitis without hemorrhage    [facility], [address] [place] [address] Edwards Anisha Lorenzo, DO    Office Visit    8 months ago Routine general medical examination at [facility] facility    [address] [place] [address],[address], [address] [facility], [facility] [place], [place]    Office Visit    1 year ago Right buttock pain    [facility], [facility] [address], [place] Ashwini Torres, DO    Office Visit                      Passed - Last BP reading less than 140/90     BP Readings from Last 1 Encounters:   10/30/23 136/70               Passed - In person appointment or virtual visit in the past 6 months     Recent Outpatient Visits              1 month ago Acute cough    [facility], [facility], [place] NAV[place], [place]    Office Visit    3 months ago Gastroesophageal reflux disease with esophagitis without hemorrhage    [address] [facility]    Office Visit    4 months ago Gastroesophageal reflux disease with esophagitis without hemorrhage Devan Grey, [place]    Office Visit    8 months ago Routine general medical examination at a health care facility    [address], [address] [facility], [facility] [place], [place]    Office Visit    1 year ago [facility] buttock pain    [facility], [facility] [address], [place], Ollie Nogueira, DO    Office Visit                      Passed - EGFRCR or GFRNAA > 50     GFR Evaluation  EGFRCR: 119 , resulted on 4/29/2023             Recent Outpatient Visits              1 month ago Acute cough    [facility] [address], [place] OULAIN[place] B, DO    Office Visit    3 months ago Gastroesophageal reflux disease with esophagitis without hemorrhage    [address] [facility]    Office Visit    4 months ago Gastroesophageal reflux disease with esophagitis without hemorrhage    [facility], [facility]    Office Visit    8 months ago Routine general medical examination at a health care facility    [address], 148 [facility], [facility] [place], [place]    Office Visit    1 year ago Right buttock pain    [address] [facility], [address] [facility], Ollie Nogueira, DO    Office Visit

## 2023-12-18 NOTE — TELEPHONE ENCOUNTER
Call reviewed and noted. Patient was supposed to have an EGD with potential biopsy on January 11 given a clinical history of dysphasia to screen for eosinophilic esophagitis.   Unfortunately since he did not have the procedure performed I still do not know
LMTCB please inform pt that office closed at this time and will reopen at [facility] tomorrow. Staff will try to reattempt to call again 1/12/16.
OK to LM per FYI on cell phone. I notified him of Dr. Karen Beckham message as written below. Please call to confirm receipt of message. Plan to keep f/u on 1/21/17.
Patient currently with f/u with testing on 1/21/17. LM and advised to keep this appt. We will plan to determine which allergens to give him immunotherapy for at that time. Dr. Izaguirre Foot to review plan of care at that time.   Please call with any further ques
Pt calling in states he wants to make sure he is still going to get allergy testing with foods.
Pt calling stated wants Dr Estuardo Reyes to know he refused to have procedure done by GI, didn't know the name, but that he tested very high for allergies and wants to know what next step should be. Please advise.
Spoke with patient, notified him we may test for foods, will determine with Dr. Shivam Dejesus at [facility] [place] [facility] what foods to test for based on history.   He is aware that the plan of care may change somewhat provided he does not have a formal dx of eosinophilic esophagitis du
n/a

## 2023-12-19 RX ORDER — LOSARTAN POTASSIUM 100 MG/1
100 TABLET ORAL DAILY
Qty: 90 TABLET | Refills: 0 | Status: SHIPPED | OUTPATIENT
Start: 2023-12-19

## 2023-12-19 NOTE — TELEPHONE ENCOUNTER
Please review; protocol failed. Patient requesting 90 day supply   Requested Prescriptions   Pending Prescriptions Disp Refills    LOSARTAN 100 MG Oral Tab [Pharmacy Med Name: LOSARTAN 100MG TABLETS] 90 tablet 0     Sig: TAKE 1 TABLET(100 MG) BY MOUTH DAILY       Hypertensive Medications Protocol Failed - 12/18/2023 12:40 PM        Failed - CMP or BMP in past 6 months     No results found for this or any previous visit (from the past 4392 hour(s)).             Passed - In person appointment in the past 12 or next 3 months     Recent Outpatient Visits              1 month ago [facility]    [facility] [address], [place] PaigehavenElvia, DO    Office Visit    3 months ago Gastroesophageal reflux disease with esophagitis without hemorrhage    [address] [facility]    Office Visit    4 months ago Gastroesophageal reflux disease with esophagitis without hemorrhage    [facility], [address] [place] [address] Erasto Lopez, DO    Office Visit    8 months ago Routine general medical examination at a [facility] facility    [address] [place] [address],[address], [address]    Office Visit    1 year ago Right buttock pain    [facility], [facility] [address], [place] Paigehacrescencio, Elvia Barrera, DO    Office Visit                      Passed - Last BP reading less than 140/90     BP Readings from Last 1 Encounters:   10/30/23 136/70               Passed - In person appointment or virtual visit in the past 6 months     Recent Outpatient Visits              1 month ago Acute cough    [facility], [address], [place] NAVARRO, DO    Office Visit    3 months ago Gastroesophageal reflux disease with esophagitis without hemorrhage    [address] [facility]    Office Visit    4 months ago Gastroesophageal reflux disease with esophagitis without hemorrhage    [facility], [address] [facility] Escobar, DO    Office Visit    8 months ago Routine general medical examination at a health care facility    [address], [address]    Office Visit    1 year ago Right buttock pain    [facility], [facility] [address], [place] Paigehacrescencio, Nya Chavarria, DO    Office Visit                      Passed - EGFRCR or GFRNAA > 50     GFR Evaluation  EGFRCR: 119 , resulted on 4/29/2023             Recent Outpatient Visits              1 month ago Acute cough    [facility], [address], [place]SE NAVARRO, DO    Office Visit    3 months ago Gastroesophageal reflux disease with esophagitis without hemorrhage    [address] [facility]    Office Visit    4 months ago Gastroesophageal reflux disease with esophagitis without hemorrhage    [facility], [address] [place] [address], DO    Office Visit    8 months ago Routine general medical examination at a health care facility    [address], [address]    Office Visit    1 year ago Right buttock pain    [address] [facility], 42 [facility], Nya Chavarria, DO    Office Visit

## 2023-12-19 NOTE — TELEPHONE ENCOUNTER
Failed Protocol/No Protocol. Requested Prescriptions   Pending Prescriptions Disp Refills    HYDROcodone-acetaminophen (NORCO)  MG Oral Tab 30 tablet 0     Sig: Take 1 tablet by mouth every 6 (six) hours as needed.        There is no refill protocol information for this order            Recent Outpatient Visits              1 month ago Acute cough    [facility], [address], [place], Louise Mindy, DO    Office Visit    3 months ago Gastroesophageal reflux disease with esophagitis without hemorrhage    [address] [facility]    Office Visit    4 months ago Gastroesophageal reflux disease with esophagitis without hemorrhage    [facility], [address] [place] [address] DO Heather    Office Visit    8 months ago Routine general medical examination at a health care facility    [address] [facility] [place],[address], [address]    Office Visit    1 year ago Right buttock pain    [address] [facility], [address] [place] [address] Jackelin Guillen DO    Office Visit

## 2023-12-21 RX ORDER — HYDROCODONE BITARTRATE AND ACETAMINOPHEN 10; 325 MG/1; MG/1
1 TABLET ORAL EVERY 6 HOURS PRN
Qty: 30 TABLET | Refills: 0 | Status: SHIPPED | OUTPATIENT
Start: 2023-12-21

## 2023-12-29 RX ORDER — ALBUTEROL SULFATE 90 UG/1
2 AEROSOL, METERED RESPIRATORY (INHALATION) EVERY 4 HOURS PRN
Qty: 18 G | Refills: 0 | Status: SHIPPED | OUTPATIENT
Start: 2023-12-29

## 2023-12-29 NOTE — TELEPHONE ENCOUNTER
Refill passed per [facility], [facility] protocol. Requested Prescriptions   Pending Prescriptions Disp Refills    albuterol 108 (90 Base) MCG/ACT Inhalation Aero Soln 18 g 0     Sig: Inhale 2 puffs into the lungs every 4 (four) hours as needed.        Asthma & COPD Medication Protocol Passed - 12/28/2023 11:13 AM        Passed - In person appointment or virtual visit in the past 6 mos or appointment in next 3 mos     Recent Outpatient Visits              2 months ago [facility] cough    [facility] [address], [place] ERIKALALINDA NAVARRO, DO    Office Visit    3 months ago Gastroesophageal reflux disease with esophagitis without hemorrhage    [address] [facility]    Office Visit    4 months ago Gastroesophageal reflux disease with esophagitis without hemorrhage    [facility], [facility]    Office Visit    9 months ago Routine general medical examination at a health care facility    [address], [address]    Office Visit    1 year ago Right buttock pain    [facility], [address], [place] Chan, DO    Office Visit                         Recent Outpatient Visits              2 months ago Acute cough    [facility], [address], [place] Andersen, DO    Office Visit    3 months ago Gastroesophageal reflux disease with esophagitis without hemorrhage    [address] [facility]    Office Visit    4 months ago Gastroesophageal reflux disease with esophagitis without hemorrhage    [facility], [address] [facility]    Office Visit    9 months ago Routine general medical examination at a health care facility    [address], [address] [place] [address] [place], [place]    Office Visit    1 year ago Right buttock pain    [address], [place], DO    Office Visit

## 2024-01-10 RX ORDER — ALBUTEROL SULFATE 90 UG/1
2 AEROSOL, METERED RESPIRATORY (INHALATION) EVERY 4 HOURS PRN
Qty: 8.5 G | Refills: 4 | OUTPATIENT
Start: 2024-01-10

## 2024-01-10 RX ORDER — ALBUTEROL SULFATE 90 UG/1
2 AEROSOL, METERED RESPIRATORY (INHALATION) EVERY 4 HOURS PRN
Qty: 8.5 G | Refills: 3 | Status: SHIPPED | OUTPATIENT
Start: 2024-01-10

## 2024-01-10 NOTE — TELEPHONE ENCOUNTER
Refill Passed Per Protocol    Requested Prescriptions   Pending Prescriptions Disp Refills    ALBUTEROL 108 (90 Base) MCG/ACT Inhalation Aero Soln [Pharmacy Med Name: ALBUTEROL HFA INH (200 PUFFS) 8.5GM] 8.5 g 0     Sig: INHALE 2 PUFFS INTO THE LUNGS EVERY 4 HOURS AS NEEDED       Asthma & COPD Medication Protocol Passed - 1/9/2024  9:23 AM        Passed - In person appointment or virtual visit in the past 6 mos or appointment in next 3 mos     Recent Outpatient Visits              2 months ago Acute cough    [facility], [facility], [place], David B, DO    Office Visit    3 months ago Gastroesophageal reflux disease with esophagitis without hemorrhage    [facility]    Office Visit    4 months ago Gastroesophageal reflux disease with esophagitis without hemorrhage    [facility]     Office Visit    9 months ago Routine general medical examination at a health care facility    [facility]     Office Visit    1 year ago Right buttock pain    [facility], [facility], [place] Ces[place], David B, DO    Office Visit                             Recent Outpatient Visits              2 months ago Acute cough    [facility], [place] Ces[place], David B,     Office Visit    3 months ago Gastroesophageal reflux disease with esophagitis without hemorrhage    [facility]    Office Visit    4 months ago Gastroesophageal reflux disease with esophagitis without hemorrhage    [facility]     Office Visit    9 months ago Routine general medical examination at a health care facility    [facility] [address],     Office Visit    1 year ago Right buttock pain    [facility], [address], [place] Chan, DO    Office Visit

## 2024-01-29 ENCOUNTER — OFFICE VISIT (OUTPATIENT)
Dept: FAMILY MEDICINE CLINIC | Facility: CLINIC | Age: 44
End: 2024-01-29

## 2024-01-29 VITALS
HEART RATE: 82 BPM | OXYGEN SATURATION: 96 % | DIASTOLIC BLOOD PRESSURE: 87 MMHG | SYSTOLIC BLOOD PRESSURE: 136 MMHG | HEIGHT: 69 IN | BODY MASS INDEX: 39.25 KG/M2 | WEIGHT: 265 LBS | TEMPERATURE: 98 F

## 2024-01-29 DIAGNOSIS — K52.9 GASTROENTERITIS: Primary | ICD-10-CM

## 2024-01-29 DIAGNOSIS — J32.9 CHRONIC SINUSITIS, UNSPECIFIED LOCATION: ICD-10-CM

## 2024-01-29 PROCEDURE — 3079F DIAST BP 80-89 MM HG: CPT | Performed by: FAMILY MEDICINE

## 2024-01-29 PROCEDURE — 3008F BODY MASS INDEX DOCD: CPT | Performed by: FAMILY MEDICINE

## 2024-01-29 PROCEDURE — 99214 OFFICE O/P EST MOD 30 MIN: CPT | Performed by: FAMILY MEDICINE

## 2024-01-29 PROCEDURE — 3075F SYST BP GE 130 - 139MM HG: CPT | Performed by: FAMILY MEDICINE

## 2024-01-29 RX ORDER — LEVOFLOXACIN 500 MG/1
500 TABLET, FILM COATED ORAL DAILY
Qty: 10 TABLET | Refills: 0 | Status: SHIPPED | OUTPATIENT
Start: 2024-01-29 | End: 2024-02-08

## 2024-01-29 NOTE — PROGRESS NOTES
Subjective:   Kolby Villa is a 43 year old male who presents for Sinus Problem (Pt states he has had a sinus infection and was on 3 different abx. Pt states on Saturday and Sunday he vomited orange vomit 15 times. Pt states his body has been in pain. )       History/Other:    Chief Complaint Reviewed and Verified  Nursing Notes Reviewed and   Verified  Tobacco Reviewed  Allergies Reviewed  Medications Reviewed    Problem List Reviewed  Medical History Reviewed  Surgical History   Reviewed  Family History Reviewed  Social History Reviewed         Tobacco:  He has never smoked tobacco.    Current Outpatient Medications   Medication Sig Dispense Refill    budesonide 32 MCG/ACT Nasal Suspension 1 spray by Nasal route daily. 1 each 1    levoFLOXacin (LEVAQUIN) 500 MG Oral Tab Take 1 tablet (500 mg total) by mouth daily for 10 days. 10 tablet 0    albuterol 108 (90 Base) MCG/ACT Inhalation Aero Soln Inhale 2 puffs into the lungs every 4 (four) hours as needed. 8.5 g 3    HYDROcodone-acetaminophen (NORCO)  MG Oral Tab Take 1 tablet by mouth every 6 (six) hours as needed. 30 tablet 0    losartan 100 MG Oral Tab Take 1 tablet (100 mg total) by mouth daily. PATIENT NEEDS FASTING BLOOD TEST FOLLOWED BY APPOINTMENT. 90 tablet 0    albuterol 108 (90 Base) MCG/ACT Inhalation Aero Soln Inhale 2 puffs into the lungs every 4 (four) hours as needed for Wheezing. 8.5 g 4    cetirizine 10 MG Oral Tab Take 1 tablet (10 mg total) by mouth every morning. 90 tablet 3    pravastatin 10 MG Oral Tab Take 1 tablet (10 mg total) by mouth nightly. 90 tablet 3         Review of Systems:  Review of Systems   Constitutional: Negative.    HENT:  Positive for congestion, ear pain and postnasal drip.    Respiratory: Negative.           Objective:   /87   Pulse 82   Temp 98.2 °F (36.8 °C)   Ht 5' 9\" (1.753 m)   Wt 265 lb (120.2 kg)   SpO2 96%   BMI 39.13 kg/m²  Estimated body mass index is 39.13 kg/m² as calculated  from the following:    Height as of this encounter: 5' 9\" (1.753 m).    Weight as of this encounter: 265 lb (120.2 kg).  Physical Exam  Vitals reviewed.   HENT:      Right Ear: A middle ear effusion is present.      Left Ear: A middle ear effusion is present.      Nose: Congestion present.      Right Sinus: Frontal sinus tenderness present.      Left Sinus: Frontal sinus tenderness present.      Mouth/Throat:      Mouth: Mucous membranes are moist.   Pulmonary:      Breath sounds: Normal breath sounds.           Assessment & Plan:   1. Gastroenteritis (Primary)  -     CBC With Differential With Platelet; Future; Expected date: 01/29/2024  -     Comp Metabolic Panel (14); Future; Expected date: 01/29/2024  2. Chronic sinusitis, unspecified location  -     ENT - INTERNAL  Other orders  -     Budesonide; 1 spray by Nasal route daily.  Dispense: 1 each; Refill: 1  -     levoFLOXacin; Take 1 tablet (500 mg total) by mouth daily for 10 days.  Dispense: 10 tablet; Refill: 0    Chronic sinus infections over the past six months.  Change nasal steroid, add abx and see ENT.  Had nasal surgery in the past.  Also over the weekend recently had two days of vomitting and illness sounds like influenza.  Check labs.        No follow-ups on file.    Albert Guerrero DO, 1/29/2024, 5:30 PM

## 2024-01-30 ENCOUNTER — LAB ENCOUNTER (OUTPATIENT)
Dept: LAB | Age: 44
End: 2024-01-30
Attending: PEDIATRICS
Payer: COMMERCIAL

## 2024-01-30 DIAGNOSIS — K52.9 GASTROENTERITIS: ICD-10-CM

## 2024-01-30 DIAGNOSIS — M54.50 ACUTE RIGHT-SIDED LOW BACK PAIN, UNSPECIFIED WHETHER SCIATICA PRESENT: Primary | ICD-10-CM

## 2024-01-30 LAB
ALBUMIN SERPL-MCNC: 4.3 G/DL (ref 3.2–4.8)
ALBUMIN/GLOB SERPL: 1.6 {RATIO} (ref 1–2)
ALP LIVER SERPL-CCNC: 63 U/L
ALT SERPL-CCNC: 34 U/L
ANION GAP SERPL CALC-SCNC: 2 MMOL/L (ref 0–18)
AST SERPL-CCNC: 22 U/L (ref ?–34)
BASOPHILS # BLD AUTO: 0.05 X10(3) UL (ref 0–0.2)
BASOPHILS NFR BLD AUTO: 0.7 %
BILIRUB SERPL-MCNC: 0.5 MG/DL (ref 0.3–1.2)
BUN BLD-MCNC: 14 MG/DL (ref 9–23)
BUN/CREAT SERPL: 18.4 (ref 10–20)
CALCIUM BLD-MCNC: 9.2 MG/DL (ref 8.7–10.4)
CHLORIDE SERPL-SCNC: 108 MMOL/L (ref 98–112)
CO2 SERPL-SCNC: 31 MMOL/L (ref 21–32)
CREAT BLD-MCNC: 0.76 MG/DL
DEPRECATED RDW RBC AUTO: 38.5 FL (ref 35.1–46.3)
EGFRCR SERPLBLD CKD-EPI 2021: 115 ML/MIN/1.73M2 (ref 60–?)
EOSINOPHIL # BLD AUTO: 0.31 X10(3) UL (ref 0–0.7)
EOSINOPHIL NFR BLD AUTO: 4.3 %
ERYTHROCYTE [DISTWIDTH] IN BLOOD BY AUTOMATED COUNT: 12.6 % (ref 11–15)
FASTING STATUS PATIENT QL REPORTED: YES
GLOBULIN PLAS-MCNC: 2.7 G/DL (ref 2.8–4.4)
GLUCOSE BLD-MCNC: 102 MG/DL (ref 70–99)
HCT VFR BLD AUTO: 45.9 %
HGB BLD-MCNC: 15.2 G/DL
IMM GRANULOCYTES # BLD AUTO: 0.02 X10(3) UL (ref 0–1)
IMM GRANULOCYTES NFR BLD: 0.3 %
LYMPHOCYTES # BLD AUTO: 2.24 X10(3) UL (ref 1–4)
LYMPHOCYTES NFR BLD AUTO: 31 %
MCH RBC QN AUTO: 27.7 PG (ref 26–34)
MCHC RBC AUTO-ENTMCNC: 33.1 G/DL (ref 31–37)
MCV RBC AUTO: 83.6 FL
MONOCYTES # BLD AUTO: 0.76 X10(3) UL (ref 0.1–1)
MONOCYTES NFR BLD AUTO: 10.5 %
NEUTROPHILS # BLD AUTO: 3.84 X10 (3) UL (ref 1.5–7.7)
NEUTROPHILS # BLD AUTO: 3.84 X10(3) UL (ref 1.5–7.7)
NEUTROPHILS NFR BLD AUTO: 53.2 %
OSMOLALITY SERPL CALC.SUM OF ELEC: 293 MOSM/KG (ref 275–295)
PLATELET # BLD AUTO: 236 10(3)UL (ref 150–450)
POTASSIUM SERPL-SCNC: 4.1 MMOL/L (ref 3.5–5.1)
PROT SERPL-MCNC: 7 G/DL (ref 5.7–8.2)
RBC # BLD AUTO: 5.49 X10(6)UL
SODIUM SERPL-SCNC: 141 MMOL/L (ref 136–145)
WBC # BLD AUTO: 7.2 X10(3) UL (ref 4–11)

## 2024-01-30 PROCEDURE — 36415 COLL VENOUS BLD VENIPUNCTURE: CPT

## 2024-01-30 PROCEDURE — 85025 COMPLETE CBC W/AUTO DIFF WBC: CPT

## 2024-01-30 PROCEDURE — 80053 COMPREHEN METABOLIC PANEL: CPT

## 2024-01-31 NOTE — TELEPHONE ENCOUNTER
Please review; protocol failed/No Protocol    Last fill dates:  11/07/2023, 12/21/2023  Requested Prescriptions   Pending Prescriptions Disp Refills    HYDROcodone-acetaminophen (NORCO)  MG Oral Tab 30 tablet 0     Sig: Take 1 tablet by mouth every 6 (six) hours as needed.       There is no refill protocol information for this order          Recent Outpatient Visits              2 days ago Gastroenteritis    [facility], Albert Fraire,     Office Visit    3 months ago Acute cough    [facility], [place] Ces[place], David B,     Office Visit    4 months ago Gastroesophageal reflux disease with esophagitis without hemorrhage    [facility]    Office Visit    5 months ago Gastroesophageal reflux disease with esophagitis without hemorrhage    [facility], Albert Fraire,     Office Visit    10 months ago Routine general medical examination at a health care facility    [facility] [address], Albert Fraire,     Office Visit

## 2024-02-01 RX ORDER — HYDROCODONE BITARTRATE AND ACETAMINOPHEN 10; 325 MG/1; MG/1
1 TABLET ORAL EVERY 8 HOURS PRN
Qty: 45 TABLET | Refills: 0 | Status: SHIPPED | OUTPATIENT
Start: 2024-02-01

## 2024-02-05 RX ORDER — ALBUTEROL SULFATE 90 UG/1
2 AEROSOL, METERED RESPIRATORY (INHALATION) EVERY 4 HOURS PRN
Qty: 8.5 G | Refills: 3 | Status: SHIPPED | OUTPATIENT
Start: 2024-02-05

## 2024-02-05 NOTE — TELEPHONE ENCOUNTER
Refill passed per [facility] protocol.  Requested Prescriptions   Pending Prescriptions Disp Refills    albuterol 108 (90 Base) MCG/ACT Inhalation Aero Soln 8.5 g 3     Sig: Inhale 2 puffs into the lungs every 4 (four) hours as needed.       Asthma & COPD Medication Protocol Passed - 2/3/2024  9:13 AM        Passed - In person appointment or virtual visit in the past 6 mos or appointment in next 3 mos     Recent Outpatient Visits              1 week ago Gastroenteritis    [facility] [place],     Office Visit    3 months ago [facility] cough    [facility]    [facility]    4 months ago Gastroesophageal reflux disease with esophagitis without hemorrhage    [facility]    Office Visit    5 months ago Gastroesophageal reflux disease with esophagitis without hemorrhage    [facility] [place],     Office Visit    10 months ago Routine general medical examination at a health care facility    [facility] [place], Albert Fraire,     Office Visit          Future Appointments         Provider Department Appt Notes    In 3 days Luis Armando Lopez MD [facility] Chronic sinus issues , sleep apnea                  Recent Outpatient Visits              1 week ago Gastroenteritis    [facility] [place],     Office Visit    3 months ago Acute cough    [facility], [place]    Office Visit    4 months ago Gastroesophageal reflux disease with esophagitis without hemorrhage    [facility]    Office Visit    5 months ago Gastroesophageal reflux disease with esophagitis without hemorrhage    [facility] [place],  Albert Fraire,     Office Visit    10 months ago Routine general medical examination at a health care facility    [facility], [address], Albert Fraire, DO    Office Visit          Future Appointments         Provider Department Appt Notes    In 3 days Luis Armando Lopez MD [facility] Chronic sinus issues , sleep apnea

## 2024-02-08 ENCOUNTER — OFFICE VISIT (OUTPATIENT)
Dept: OTOLARYNGOLOGY | Facility: CLINIC | Age: 44
End: 2024-02-08
Payer: COMMERCIAL

## 2024-02-08 VITALS — HEIGHT: 69 IN | TEMPERATURE: 98 F | BODY MASS INDEX: 39.25 KG/M2 | WEIGHT: 265 LBS

## 2024-02-08 DIAGNOSIS — J32.9 CHRONIC SINUSITIS, UNSPECIFIED LOCATION: Primary | ICD-10-CM

## 2024-02-08 DIAGNOSIS — J34.3 HYPERTROPHY OF BOTH INFERIOR NASAL TURBINATES: ICD-10-CM

## 2024-02-08 DIAGNOSIS — J34.89 NASAL OBSTRUCTION: ICD-10-CM

## 2024-02-08 DIAGNOSIS — J34.2 NASAL SEPTAL DEVIATION: ICD-10-CM

## 2024-02-08 RX ORDER — SULFAMETHOXAZOLE AND TRIMETHOPRIM 800; 160 MG/1; MG/1
1 TABLET ORAL EVERY 12 HOURS
Qty: 28 TABLET | Refills: 0 | Status: SHIPPED | OUTPATIENT
Start: 2024-02-08 | End: 2024-02-22

## 2024-02-08 RX ORDER — PREDNISONE 20 MG/1
TABLET ORAL
Qty: 18 TABLET | Refills: 0 | Status: SHIPPED | OUTPATIENT
Start: 2024-02-08 | End: 2024-02-22

## 2024-02-08 NOTE — PROGRESS NOTES
Kolby Villa is a 43 year old male.   Chief Complaint   Patient presents with    Consult     Pt referred by Dr. Guerrero for  Chronic sinusitis, concerned with sleep apnea.       ASSESSMENT AND PLAN:   1. Chronic sinusitis, unspecified location  43-year-old presents with multiple chronic ENT complaints.  He has moderate sleep apnea is diagnosed on a sleep study in September with an AHI of 24 and an O2 alejandra of 88%.  Is all history of chronic sinusitis and nasal obstruction he had surgery by Dr. COOPER in 2020 as noted that he had polyps at that time he had a full fess surgery.  His CT scan around that time demonstrated ethmoid and frontal sinusitis.  He does not do well with the CPAP mask.  He notes multiple complaints with that he has a fullface mask he also tried the nasal prong facemask.  His inability to breathe through his nose prohibits some of his CPAP use.  He is finishing up a course of Levaquin which he thinks is helping slightly.    On exam his septum is quite deviated to the right.  On the left nasal cavity it appeared that the middle turbinate may have been surgically absent.  There are some synechiae obscuring the view of the maxillary sinus.  There is thicker greenish drainage draining into the nasopharynx from the left maxillary sinus area.  On the right the nose was quite congested and somewhat difficult view of the middle meatus although no gross purulence was seen.  The middle turbinate on the right was intact    He has moderate sleep apnea not tolerant of CPAP and also nasal obstruction with a septal deviation and chronic sinusitis in the setting of previous sinus surgery.  1 option for him would be an oral appliance with regards to his moderate sleep apnea.  I gave him the information for the [facility].  He eventually may need to have a revision septoplasty to help him breathe through his nose.  We will obtain a CT scan of his sinuses to investigate these further.  Does  appear that he has a chronic sinusitis.  Will begin him on a 2-week course of Bactrim and oral steroid burst and taper.  He has a history of hypertension no diabetes discussed the risk of the medications.  Like to see him back in 3 weeks to review everything with him.    -Short term use risks of oral steroids include fluid retention, causing swelling in your lower legs, high blood pressure, mood swings, memory, behavior, and other psychological effects, such as confusion or delirium, upset stomach, increased blood sugar, and other less likely but serious side effects such as avascular necrosis    Consult from Dr. Guerrero regarding sinus evaluation     - CT SINUS [facility] (CPT=70486); Future    2. Nasal septal deviation      3. Nasal obstruction      4. Hypertrophy of both inferior nasal turbinates        The patient indicates understanding of these issues and agrees to the plan.      EXAM:   Temp 98.1 °F (36.7 °C) (Tympanic)   Ht 5' 9\" (1.753 m)   Wt 265 lb (120.2 kg)   BMI 39.13 kg/m²     Pertinent exam findings may also be noted above in assessment and plan     System Details   Skin Inspection - Normal.   Constitutional Overall appearance - Normal.   Head/Face Symmetric, TMJ tenderness not present    Eyes EOMI, PERRL   Right ear:  Canal clear, TM intact, no JASS   Left ear:  Canal clear, TM intact, no JASS   Nose: On exam his septum is quite deviated to the right.  On the left nasal cavity it appeared that the middle turbinate may have been surgically absent.  There are some synechiae obscuring the view of the maxillary sinus.  There is thicker greenish drainage draining into the nasopharynx from the left maxillary sinus area.  On the right the nose was quite congested and somewhat difficult view of the middle meatus although no gross purulence was seen.  The middle turbinate on the right was intact   Oral cavity/Oropharynx: No lesions or masses on inspection or palpation, tonsils symmetric    Neck: Soft  without LAD, thyroid not enlarged  Voice clear/ no stridor   Other:      Scopes and Procedures:     Nasal Endoscopy Procedure Note     Due to inability for adequate examination of the nose and nasopharynx and need for magnification to perform the examination, endoscopy was performed.  Risks and benefits were discussed with patient/family and they have given verbal consent to proceed.    Pre-operative Diagnosis:   1. Chronic sinusitis, unspecified location    2. Nasal septal deviation    3. Nasal obstruction    4. Hypertrophy of both inferior nasal turbinates        Post-operative Diagnosis: Same    Procedure: Diagnostic nasal endoscopy    Anesthesia: Topical anesthetic [place]     Surgeon Luis Armando Lopez MD    EBL: 0cc    Procedure Detail & Findings:     After placement of topical anesthetic intranasally the endoscope was inserted into each nares and driven through the nasal cavity into the nasopharynx. The following findings were noted:    Septum: On exam his septum is quite deviated to the right.     Middle meatus: Patent  Middle turbinates:  On the left nasal cavity it appeared that the middle turbinate may have been surgically absent.    Purulence: There are some synechiae obscuring the view of the maxillary sinus.  There is thicker greenish drainage draining into the nasopharynx from the left maxillary sinus area.  On the right the nose was quite congested and somewhat difficult view of the middle meatus although no gross purulence was seen.  The middle turbinate on the right was intact  Polyps: None noted  Nasopharynx and eustachian tube: No masses  Other: The middle and superior meatus, the turbinates, and the spheno-ethmoid recess were inspected and seen to be without significant abnormal findings.     Condition: Stable    Complications: Patient tolerated the procedure well with no immediate complication.    Luis Armando Lopez MD        Current Outpatient Medications   Medication Sig Dispense Refill    predniSONE 20  MG Oral Tab Take 1.5 tablets (30 mg total) by mouth daily for 7 days, THEN 1 tablet (20 mg total) daily for 7 days. 18 tablet 0    sulfamethoxazole-trimethoprim -160 MG Oral Tab per tablet Take 1 tablet by mouth every 12 (twelve) hours for 14 days. 28 tablet 0    albuterol 108 (90 Base) MCG/ACT Inhalation Aero Soln Inhale 2 puffs into the lungs every 4 (four) hours as needed. 8.5 g 3    HYDROcodone-acetaminophen (NORCO)  MG Oral Tab Take 1 tablet by mouth every 8 (eight) hours as needed. 45 tablet 0    budesonide 32 MCG/ACT Nasal Suspension 1 spray by Nasal route daily. 1 each 1    levoFLOXacin (LEVAQUIN) 500 MG Oral Tab Take 1 tablet (500 mg total) by mouth daily for 10 days. 10 tablet 0    losartan 100 MG Oral Tab Take 1 tablet (100 mg total) by mouth daily. PATIENT NEEDS FASTING BLOOD TEST FOLLOWED BY APPOINTMENT. 90 tablet 0    albuterol 108 (90 Base) MCG/ACT Inhalation Aero Soln Inhale 2 puffs into the lungs every 4 (four) hours as needed for Wheezing. 8.5 g 4    cetirizine 10 MG Oral Tab Take 1 tablet (10 mg total) by mouth every morning. 90 tablet 3    pravastatin 10 MG Oral Tab Take 1 tablet (10 mg total) by mouth nightly. 90 tablet 3      Past Medical History:   Diagnosis Date    Aspirin sensitivity     ASA Sensitivity    Asthma     Samples tried    Chronic sinusitis     Esophageal reflux     High blood pressure     Hyperlipidemia     Hypertension     Nasal polyps     Problems with swallowing     Sleep apnea     Visual impairment     GLASSES      Social History:  Social History     Socioeconomic History    Marital status: Single    Number of children: 1   Occupational History    Occupation: /[employer]   Tobacco Use    Smoking status: Never    Smokeless tobacco: Never    Tobacco comments:     Girlfriend smokes outside   Vaping Use    Vaping Use: Never used   Substance and Sexual Activity    Alcohol use: Never     Alcohol/week: 0.0 standard drinks of alcohol    Drug use: No   Other  Topics Concern    Caffeine Concern Yes     Comment: 2 cups tea daily          Luis Armando Lopez MD  2/8/2024  2:16 PM

## 2024-02-09 ENCOUNTER — PATIENT MESSAGE (OUTPATIENT)
Dept: OTOLARYNGOLOGY | Facility: CLINIC | Age: 44
End: 2024-02-09

## 2024-02-14 NOTE — TELEPHONE ENCOUNTER
, [facility] ok to advise pt will need to contact insurance to see Dentist within network.

## 2024-02-14 NOTE — TELEPHONE ENCOUNTER
From: Kolby Villa  To: Luis Armando Lopez  Sent: 2/9/2024 10:39 AM CST  Subject: Referral    Doctor recommended me going to [facility] in [place] I need a referral from him to see if my insurance will cover this , do please ask him to do this,

## 2024-02-16 ENCOUNTER — HOSPITAL ENCOUNTER (OUTPATIENT)
Dept: CT IMAGING | Age: 44
Discharge: HOME | End: 2024-02-16
Attending: STUDENT IN AN ORGANIZED HEALTH CARE EDUCATION/TRAINING PROGRAM
Payer: COMMERCIAL

## 2024-02-16 DIAGNOSIS — J32.9 CHRONIC SINUSITIS, UNSPECIFIED LOCATION: ICD-10-CM

## 2024-02-16 PROCEDURE — 70486 CT MAXILLOFACIAL W/O DYE: CPT | Performed by: STUDENT IN AN ORGANIZED HEALTH CARE EDUCATION/TRAINING PROGRAM

## 2024-02-19 ENCOUNTER — PATIENT MESSAGE (OUTPATIENT)
Dept: FAMILY MEDICINE CLINIC | Facility: CLINIC | Age: 44
End: 2024-02-19

## 2024-02-20 NOTE — TELEPHONE ENCOUNTER
Dr. Guerrero please see rx request from patient and my reply to him.  Do you wish to defer to ENT?

## 2024-02-20 NOTE — TELEPHONE ENCOUNTER
Alma Briceño, RN 2/20/2024 1:00 PM CST        ----- Message -----  From: Kolby Villa  Sent: 2/19/2024 10:34 PM CST  To: Em Triage Support  Subject: Medicine     Can you give me a prescription for xyzal allergy pills. Instead of using the generic Zyrtec it doesn’t work

## 2024-02-21 ENCOUNTER — PATIENT MESSAGE (OUTPATIENT)
Dept: OTOLARYNGOLOGY | Facility: CLINIC | Age: 44
End: 2024-02-21

## 2024-02-22 RX ORDER — ALBUTEROL SULFATE 90 UG/1
2 AEROSOL, METERED RESPIRATORY (INHALATION) EVERY 4 HOURS PRN
Qty: 8.5 G | Refills: 3 | OUTPATIENT
Start: 2024-02-22

## 2024-02-22 NOTE — TELEPHONE ENCOUNTER
Duplicate request, previously addressed.      Too soon     Disp Refills Start End    albuterol 108 (90 Base) MCG/ACT Inhalation Aero Soln 8.5 g 3 2/5/2024 --    Sig - Route: Inhale 2 puffs into the lungs every 4 (four) hours as needed. - Inhalation    Sent to pharmacy as: Albuterol Sulfate  (90 Base) MCG/ACT Inhalation Aerosol Solution (Ventolin HFA)    E-Prescribing Status: Receipt confirmed by pharmacy (2/5/2024  2:37 PM CST

## 2024-02-23 NOTE — TELEPHONE ENCOUNTER
From: Kolby Villa  To: Luis Armando Lopez  Sent: 2/21/2024 8:36 PM CST  Subject: Test results     What did the doctor recommend after seeing my test results

## 2024-02-25 DIAGNOSIS — M54.50 ACUTE RIGHT-SIDED LOW BACK PAIN, UNSPECIFIED WHETHER SCIATICA PRESENT: ICD-10-CM

## 2024-02-26 ENCOUNTER — PATIENT MESSAGE (OUTPATIENT)
Dept: FAMILY MEDICINE CLINIC | Facility: CLINIC | Age: 44
End: 2024-02-26

## 2024-02-26 DIAGNOSIS — J32.9 SINUSITIS, UNSPECIFIED CHRONICITY, UNSPECIFIED LOCATION: ICD-10-CM

## 2024-02-26 DIAGNOSIS — J32.9 RECURRENT SINUSITIS: ICD-10-CM

## 2024-02-26 DIAGNOSIS — J01.00 ACUTE MAXILLARY SINUSITIS, RECURRENCE NOT SPECIFIED: Primary | ICD-10-CM

## 2024-02-26 RX ORDER — HYDROCODONE BITARTRATE AND ACETAMINOPHEN 10; 325 MG/1; MG/1
1 TABLET ORAL EVERY 8 HOURS PRN
Qty: 45 TABLET | Refills: 0 | OUTPATIENT
Start: 2024-02-26

## 2024-02-26 RX ORDER — LEVOCETIRIZINE DIHYDROCHLORIDE 5 MG/1
5 TABLET, FILM COATED ORAL EVERY EVENING
Qty: 90 TABLET | Refills: 3 | Status: SHIPPED | OUTPATIENT
Start: 2024-02-26

## 2024-02-26 NOTE — TELEPHONE ENCOUNTER
Protocol Failed/ No Protocol    Requested Prescriptions   Pending Prescriptions Disp Refills    HYDROcodone-acetaminophen (NORCO)  MG Oral Tab 45 tablet 0     Sig: Take 1 tablet by mouth every 8 (eight) hours as needed.       Controlled Substance Medication Failed - 2/25/2024  8:56 AM        Failed - This medication is a controlled substance - forward to provider to refill             Future Appointments         Provider Department Appt Notes    In 2 weeks Luis Armando Lopez MD [facility] 2/26  sent for referral MR =Follow up on test results          Recent Outpatient Visits              2 weeks ago Chronic sinusitis, unspecified location    [facility], Luis Armando Roy MD    Office Visit    4 weeks ago Gastroenteritis    [facility], CashAlbert Whalen DO    Office Visit    3 months ago Acute cough    [facility], [place] Cespedes, David B, DO    Office Visit    5 months ago Gastroesophageal reflux disease with esophagitis without hemorrhage    [facility]    Office Visit    6 months ago Gastroesophageal reflux disease with esophagitis without hemorrhage    [facility], CashAlbert Whalen,     Office Visit

## 2024-02-28 ENCOUNTER — PATIENT OUTREACH (OUTPATIENT)
Dept: CASE MANAGEMENT | Age: 44
End: 2024-02-28

## 2024-02-28 ENCOUNTER — TELEPHONE (OUTPATIENT)
Dept: FAMILY MEDICINE CLINIC | Facility: CLINIC | Age: 44
End: 2024-02-28

## 2024-02-28 DIAGNOSIS — M54.50 ACUTE RIGHT-SIDED LOW BACK PAIN, UNSPECIFIED WHETHER SCIATICA PRESENT: ICD-10-CM

## 2024-02-28 NOTE — PROCEDURES
Received order requesting to update PCP to Dr. Albert Guerrero is Denied and finalized on February 28, 2024.      [facility] patient is listed on the February 2024 [facility] eligibility list:  [facility] Attributed PCP is Dr. Eligio Chan    Office, must contact patient to verify PCP. Office must also Inform patient they must contact [facility] to provide the name of current PCP - Dr. Guerrero    [facility] PCP assignment is handled by [facility], not [facility] Members have 3 options to contact [facility] to change their PCP:  [employer] member portal website [email or web address] do you roll?   Email [employer] customer service [email or web address]?  Call [employer] customer service at [phone]      Office should also mail patient a letter to contact office to schedule an appointment or contact [facility] to update their PCP.    After office has verified patient is no longer listed on the [facility] monthly eligibility list and not assigned to Dr. Chan then office must resubmit a new order for PCP removal request.      Thanks,  [facility] Team

## 2024-02-28 NOTE — TELEPHONE ENCOUNTER
Rx pended    Thank you can you also check and if dr mitchell refilled my [employer] [facility]

## 2024-02-28 NOTE — TELEPHONE ENCOUNTER
Dr Guerrero = see message, RN pended the referral. Thanks.     From: Kolby Villa  To: Albert Guerrero  Sent: 2/26/2024  2:49 PM CST  Subject: Need a new referral    I need to go back and see dr. Lopez he wants me to come back , so I will need a new referral I have a appointment scheduled for march 11 so I need  that to be done asap, thank you

## 2024-02-28 NOTE — TELEPHONE ENCOUNTER
PCP showing Eligio Chan DO   RN generated PCP change request per protocol.     Dr Geurrero=new PCP

## 2024-02-29 NOTE — TELEPHONE ENCOUNTER
Please see results note for CT scan, pt reviewed results from DR.Miller laureano on 2/28    Debra Jarrett, I reviewed your CT scan.  It shows the changes after your last sinus surgery and that there is swollen tissue mostly on the left cheek sinus area consistent with what I saw in the office that you had an infection on this side.  I would like to see you back in the office so I can look to see how the infection is doing in your nose.  It also showed that your septum was deviated to the right as I had seen in the office as well.  Some of these things could possibly be helped with a procedure but I would like to see how the medications have helped you.  Thank you, Dr. Lopez   Written by Luis Armando Lopez MD on 2/26/2024 12:50 PM CST  Seen by patient Kolby Villa on 2/28/2024  6:58 PM

## 2024-02-29 NOTE — TELEPHONE ENCOUNTER
Please review; protocol failed/No Protocol    Fill dates: 12/21/2023, 02/02/2024  Requested Prescriptions   Pending Prescriptions Disp Refills    HYDROcodone-acetaminophen (NORCO)  MG Oral Tab 45 tablet 0     Sig: Take 1 tablet by mouth every 8 (eight) hours as needed.       Controlled Substance Medication Failed - 2/28/2024  4:27 PM        Failed - This medication is a controlled substance - forward to provider to refill           Future Appointments         Provider Department Appt Notes    In 1 week Luis Armando Lopez MD [facility] 2/26  sent for referral MR =Follow up on test results          Recent Outpatient Visits              3 weeks ago Chronic sinusitis, unspecified location    [facility] Luis Armando Lopez MD    Office Visit    1 month ago Gastroenteritis    [facility] [place] Albert Guerrero DO    Office Visit    4 months ago Acute cough    [facility], [place] Cespedes, David B,     Office Visit    5 months ago Gastroesophageal reflux disease with esophagitis without hemorrhage    [facility]    Office Visit    6 months ago Gastroesophageal reflux disease with esophagitis without hemorrhage    [facility] [place]    Office Visit

## 2024-03-01 RX ORDER — HYDROCODONE BITARTRATE AND ACETAMINOPHEN 10; 325 MG/1; MG/1
1 TABLET ORAL EVERY 8 HOURS PRN
Qty: 45 TABLET | Refills: 0 | Status: SHIPPED | OUTPATIENT
Start: 2024-03-01

## 2024-03-04 ENCOUNTER — HOSPITAL ENCOUNTER (OUTPATIENT)
Age: 44
Discharge: HOME | End: 2024-03-04
Attending: EMERGENCY MEDICINE
Payer: COMMERCIAL

## 2024-03-04 ENCOUNTER — APPOINTMENT (OUTPATIENT)
Dept: ULTRASOUND IMAGING | Age: 44
End: 2024-03-04
Attending: EMERGENCY MEDICINE
Payer: COMMERCIAL

## 2024-03-04 ENCOUNTER — APPOINTMENT (OUTPATIENT)
Dept: GENERAL RADIOLOGY | Age: 44
End: 2024-03-04
Attending: EMERGENCY MEDICINE
Payer: COMMERCIAL

## 2024-03-04 VITALS
DIASTOLIC BLOOD PRESSURE: 91 MMHG | HEART RATE: 78 BPM | SYSTOLIC BLOOD PRESSURE: 148 MMHG | TEMPERATURE: 99 F | OXYGEN SATURATION: 96 % | RESPIRATION RATE: 16 BRPM

## 2024-03-04 DIAGNOSIS — S86.812A STRAIN OF LEFT CALF MUSCLE: Primary | ICD-10-CM

## 2024-03-04 PROCEDURE — 73590 X-RAY EXAM OF LOWER LEG: CPT | Performed by: EMERGENCY MEDICINE

## 2024-03-04 PROCEDURE — 99214 OFFICE O/P EST MOD 30 MIN: CPT

## 2024-03-04 PROCEDURE — 93971 EXTREMITY STUDY: CPT | Performed by: EMERGENCY MEDICINE

## 2024-03-04 NOTE — ED PROVIDER NOTES
Patient Seen in: [facility] [place]      History     Chief Complaint   Patient presents with    Leg or Foot Injury     Stated Complaint: Knee or Leg Pain    Subjective:   HPI    Patient is a 43-year-old male with past history of hypertension who presents now with left calf pain.  The patient states he was helping somebody move yesterday.  The patient was carrying some items when he stepped off the back of a truck and had sudden onset of pain in the left calf area.  Patient states that the area became quite swollen yesterday.  Patient presents now with persistent pain and swelling to the left calf area.  The patient denies any numbness or tingling in the foot.  Patient states the pain worsens with weightbearing.    Objective:   Past Medical History:   Diagnosis Date    Aspirin sensitivity     ASA Sensitivity    Asthma (HCC)     Samples tried    Chronic sinusitis     Esophageal reflux     High blood pressure     Hyperlipidemia     Hypertension     Nasal polyps     Problems with swallowing     Sleep apnea     Visual impairment     GLASSES              Past Surgical History:   Procedure Laterality Date    EXCISION TURBINATE,SUBMUCOUS  02/14/2020    NASAL SCOPE,BX/RMV POLYP/DEBRID  02/14/2020    NASAL SCOPY,REMV PART ETHMOID  02/14/2020    NASAL SCOPY,RMV TISS MAXILL SINUS  02/14/2020                Social History     Socioeconomic History    Marital status: Single    Number of children: 1   Occupational History    Occupation: /[employer]   Tobacco Use    Smoking status: Never    Smokeless tobacco: Never    Tobacco comments:     Girlfriend smokes outside   Vaping Use    Vaping Use: Never used   Substance and Sexual Activity    Alcohol use: Never     Alcohol/week: 0.0 standard drinks of alcohol    Drug use: No   Other Topics Concern    Caffeine Concern Yes     Comment: 2 cups tea daily              Review of Systems    Positive for stated complaint: Knee or Leg Pain  Other systems are as noted in  HPI.  Constitutional and vital signs reviewed.      All other systems reviewed and negative except as noted above.    Physical Exam     ED Triage Vitals [03/04/24 0959]   BP (!) 148/91   Pulse 78   Resp 16   Temp 98.5 °F (36.9 °C)   Temp src Temporal   SpO2 96 %   O2 Device None (Room air)       Current:BP (!) 148/91   Pulse 78   Temp 98.5 °F (36.9 °C) (Temporal)   Resp 16   SpO2 96%         Physical Exam    Constitutional: Well-developed well-nourished in no acute distress  Head: Normocephalic, no swelling or tenderness  Eyes: Nonicteric sclera, no conjunctival injection  Vascular: Palpable left posterior tibial pulse with good capillary refill into all toes  Neurologic: Patient is awake, alert and oriented ×3.  The patient's motor strength is 5 out of 5 and symmetric in the upper and lower extremities bilaterally  Extremities: There is mild swelling and tenderness of the left posterior calf.  Dorsi and plantarflexion of the left ankle are intact.  There is no palpable defect of the left Achilles.  Skin: No pallor, no redness or warmth to the touch      ED Course   Labs Reviewed - No data to display          Patient's x-ray results were independently reviewed by myself.  There is no acute bony abnormality noted on the left lower leg films.  Radiologist report regarding no DVT of the left lower extremity was reviewed.      Patient's x-ray results were discussed with him.  On physical exam, no indication for acute Achilles tendon injury though the patient does have persistent pain in the left posterior calf area.  Will provide with orthopedic follow-up.  Recommend anti-inflammatories for pain.  Crutches were offered but the patient prefers a cane         MDM      Musculoskeletal strain versus DVT versus bony abnormality of the lower leg                                   Medical Decision Making      Disposition and Plan     Clinical Impression:  1. Strain of left calf muscle          Disposition:  Discharge  3/4/2024 11:19 am    Follow-up:  Marcela Olivo MD  [address],  [place] [address] [phone]      For any persistent left lower leg pain          Medications Prescribed:  Current Discharge Medication List

## 2024-03-04 NOTE — ED INITIAL ASSESSMENT (HPI)
Patient arrives ambulatory with c/o left calf pain after stepping out a truck while he was helping family move yesterday. Reports he heard a snap at the time. Reports difficulty putting pressure on leg.

## 2024-03-06 ENCOUNTER — TELEPHONE (OUTPATIENT)
Dept: FAMILY MEDICINE CLINIC | Facility: CLINIC | Age: 44
End: 2024-03-06

## 2024-03-06 ENCOUNTER — OFFICE VISIT (OUTPATIENT)
Dept: FAMILY MEDICINE CLINIC | Facility: CLINIC | Age: 44
End: 2024-03-06

## 2024-03-06 VITALS
BODY MASS INDEX: 37.71 KG/M2 | HEART RATE: 77 BPM | SYSTOLIC BLOOD PRESSURE: 159 MMHG | HEIGHT: 69 IN | WEIGHT: 254.63 LBS | DIASTOLIC BLOOD PRESSURE: 112 MMHG

## 2024-03-06 DIAGNOSIS — T14.8XXA HEMATOMA: ICD-10-CM

## 2024-03-06 DIAGNOSIS — I10 ESSENTIAL HYPERTENSION: Primary | ICD-10-CM

## 2024-03-06 PROCEDURE — 99214 OFFICE O/P EST MOD 30 MIN: CPT | Performed by: NURSE PRACTITIONER

## 2024-03-06 PROCEDURE — 3008F BODY MASS INDEX DOCD: CPT | Performed by: NURSE PRACTITIONER

## 2024-03-06 PROCEDURE — 3077F SYST BP >= 140 MM HG: CPT | Performed by: NURSE PRACTITIONER

## 2024-03-06 PROCEDURE — 3080F DIAST BP >= 90 MM HG: CPT | Performed by: NURSE PRACTITIONER

## 2024-03-06 RX ORDER — TELMISARTAN AND AMLODIPINE 5; 40 MG/1; MG/1
1 TABLET ORAL DAILY
Qty: 90 TABLET | Refills: 1 | Status: SHIPPED | OUTPATIENT
Start: 2024-03-06

## 2024-03-06 RX ORDER — URSODIOL 500 MG/1
500 TABLET, FILM COATED ORAL 2 TIMES DAILY
COMMUNITY
Start: 2024-01-29

## 2024-03-06 NOTE — PROGRESS NOTES
HPI    Pt presents for ER follow up on 3/4 for left lower leg injury and calf pain . Was helping MIL move and when he got off truck he felt something pop.    Still having a lot of pain and swelling to left calf.  Toes will sometimes feel a little numb.    Will ice, elevated and take tylenol. Did not get ibuprofen rx'd so did not take over the counter.       Left lower leg xr within normal limits and ultrasound neg for dvt but shows likely hematoma.     Has been under a lot of stress lately. Checks bp at home and has been running high.   Review of Systems   Constitutional:  Positive for activity change.   Eyes:  Negative for visual disturbance.   Respiratory:  Negative for chest tightness and shortness of breath.    Cardiovascular:  Negative for chest pain.   Musculoskeletal:  Positive for gait problem and myalgias.   Neurological:  Negative for headaches.       Vitals:    03/06/24 1000 03/06/24 1022   BP: (!) 154/96 (!) 159/112   Pulse: 77    Weight: 254 lb 9.6 oz (115.5 kg)    Height: 5' 9\" (1.753 m)      Body mass index is 37.6 kg/m².  Wt Readings from Last 6 Encounters:   03/06/24 254 lb 9.6 oz (115.5 kg)   02/08/24 265 lb (120.2 kg)   01/29/24 265 lb (120.2 kg)   10/30/23 266 lb (120.7 kg)   08/14/23 260 lb (117.9 kg)   03/27/23 245 lb (111.1 kg)         Health Maintenance   Topic Date Due    Asthma Action Plan  Never done    Pneumococcal Vaccine: Birth to 65yrs (1 of 2 - PCV) Never done    DTaP,Tdap,and Td Vaccines (2 - Tdap) 01/01/2016    Asthma Control Test  02/01/2018    COVID-19 Vaccine (3 - 2023-24 season) 09/01/2023    Influenza Vaccine (1) 10/01/2023    Annual Depression Screening  01/01/2024    Annual Physical  03/27/2024       Past Medical History:   Diagnosis Date    Aspirin sensitivity     ASA Sensitivity    Asthma (HCC)     Samples tried    Chronic sinusitis     Esophageal reflux     High blood pressure     Hyperlipidemia     Hypertension     Nasal polyps     Problems with swallowing     Sleep apnea      Visual impairment     GLASSES       .  Past Surgical History:   Procedure Laterality Date    Excision turbinate,submucous  02/14/2020    Nasal scope,bx/rmv polyp/debrid  02/14/2020    Nasal scopy,remv part ethmoid  02/14/2020    Nasal scopy,rmv tiss maxill sinus  02/14/2020       Family History   Problem Relation Age of Onset    Asthma Mother     Prostate Cancer Father     Hypertension Father        Social History     Socioeconomic History    Marital status: Single     Spouse name: Not on file    Number of children: 1    Years of education: Not on file    Highest education level: Not on file   Occupational History    Occupation: /[employer]   Tobacco Use    Smoking status: Never    Smokeless tobacco: Never    Tobacco comments:     Girlfriend smokes outside   Vaping Use    Vaping Use: Never used   Substance and Sexual Activity    Alcohol use: Never     Alcohol/week: 0.0 standard drinks of alcohol    Drug use: No    Sexual activity: Not on file   Other Topics Concern     Service Not Asked    Blood Transfusions Not Asked    Caffeine Concern Yes     Comment: 2 cups tea daily    Occupational Exposure Not Asked    Hobby Hazards Not Asked    Sleep Concern Not Asked    Stress Concern Not Asked    Weight Concern Not Asked    Special Diet Not Asked    Back Care Not Asked    Exercise Not Asked    Bike Helmet Not Asked    Seat Belt Not Asked    Self-Exams Not Asked   Social History Narrative    Not on file     Social Determinants of Health     Financial Resource Strain: Not on file   Food Insecurity: Not on file   Transportation Needs: Not on file   Physical Activity: Not on file   Stress: Not on file   Social Connections: Not on file   Housing Stability: Not on file       Current Outpatient Medications   Medication Sig Dispense Refill    Ursodiol 500 MG Oral Tab Take 1 tablet (500 mg total) by mouth 2 (two) times daily.      Telmisartan-amLODIPine 40-5 MG Oral Tab Take 1 tablet by mouth daily. 90  tablet 1    HYDROcodone-acetaminophen (NORCO)  MG Oral Tab Take 1 tablet by mouth every 8 (eight) hours as needed. 45 tablet 0    levocetirizine 5 MG Oral Tab Take 1 tablet (5 mg total) by mouth every evening. 90 tablet 3    albuterol 108 (90 Base) MCG/ACT Inhalation Aero Soln Inhale 2 puffs into the lungs every 4 (four) hours as needed. 8.5 g 3    budesonide 32 MCG/ACT Nasal Suspension 1 spray by Nasal route daily. 1 each 1    albuterol 108 (90 Base) MCG/ACT Inhalation Aero Soln Inhale 2 puffs into the lungs every 4 (four) hours as needed for Wheezing. 8.5 g 4    pravastatin 10 MG Oral Tab Take 1 tablet (10 mg total) by mouth nightly. 90 tablet 3       Allergies:  Allergies   Allergen Reactions    Peas SWELLING and OTHER (SEE COMMENTS)     Facial swelling    Strawberries RASH and SWELLING     Facial swelling    Atorvastatin OTHER (SEE COMMENTS)     Other reaction(s): ruq abd pain    Ibuprofen OTHER (SEE COMMENTS)     Other reaction(s): Trouble Breathing       Physical Exam  Vitals and nursing note reviewed.   Constitutional:       Appearance: Normal appearance.   Cardiovascular:      Rate and Rhythm: Normal rate.   Pulmonary:      Effort: Pulmonary effort is normal. No respiratory distress.   Musculoskeletal:         General: Swelling, tenderness and signs of injury present.      Right lower leg: Normal.      Left lower leg: Tenderness present. No swelling or bony tenderness.        Legs:       Comments: No erythema or ecchymosis.    Neurological:      Mental Status: He is alert.        notes and imaging reviewed.   Assessment and Plan:   Problem List Items Addressed This Visit       Essential hypertension - Primary     Stop losartan  Start telmisartan-amlodipine  40/5 mg I po q d  Check blood pressure twice per day with arm cuff bp monitor for 2 weeks  Record date, time, blood pressure and pulse reading  Send in copy of bp log to me via [email or web address] or you may call into nurse triage staff  Call if bp  consistently > 140/86  Go to ER if any severe headache, chest pain, shortness of breath, visual changes  Please let me know if you have any questions or concerns.              Relevant Medications    Telmisartan-amLODIPine 40-5 MG Oral Tab    Hematoma     Discussed ice/heat, elevation and recovery time from injury  Pt is allergic to ibuprofen-do not take nsaids  Tyelnol for pain-has prescription/s from Dr Guerrero for norco   Please call if symptoms worsen or are not resolving.  Discussed w pt red flag symptoms that if they occur, pt should immediately go to ER. Pt states understanding.                      Discussed plan of care with pt and pt is in agreement.All questions answered. Pt to call with questions or concerns.      Encouraged to sign up for My Chart if not already registered.

## 2024-03-06 NOTE — TELEPHONE ENCOUNTER
Patient calling to inform that he was told by pharmacy that insurance will not cover Telmisartan-amLODIPine 40-5 MG Oral Tab   Please advise of alternate medication

## 2024-03-06 NOTE — TELEPHONE ENCOUNTER
Left detailed voicemail (TESSIE) to call back our office after speaking with his insurance to find out the appropriate alternatives to the medication in question. Office phone number provided with telephone hours.    Also included in voice message, that information left will also be sent via [employer].

## 2024-03-07 NOTE — TELEPHONE ENCOUNTER
Wait a couple of days to give pt time to call his insurance and reach out back to us with alternatives

## 2024-03-08 RX ORDER — TELMISARTAN 40 MG/1
40 TABLET ORAL DAILY
Qty: 90 TABLET | Refills: 1 | Status: SHIPPED | OUTPATIENT
Start: 2024-03-08

## 2024-03-08 RX ORDER — AMLODIPINE BESYLATE 5 MG/1
5 TABLET ORAL DAILY
Qty: 90 TABLET | Refills: 1 | Status: SHIPPED | OUTPATIENT
Start: 2024-03-08 | End: 2025-03-03

## 2024-03-11 ENCOUNTER — TELEPHONE (OUTPATIENT)
Dept: FAMILY MEDICINE CLINIC | Facility: CLINIC | Age: 44
End: 2024-03-11

## 2024-03-11 NOTE — TELEPHONE ENCOUNTER
Current Outpatient Medications:     amLODIPine 5 MG Oral Tab, Take 1 tablet (5 mg total) by mouth daily., Disp: 90 tablet, Rfl: 1    KEY: [id number]

## 2024-03-26 RX ORDER — URSODIOL 500 MG/1
500 TABLET, FILM COATED ORAL DAILY
Qty: 90 TABLET | Refills: 3 | Status: SHIPPED | OUTPATIENT
Start: 2024-03-26

## 2024-03-26 NOTE — TELEPHONE ENCOUNTER
[employer] message sent to pt, await reply   Rx listed as historical.   pls advise, thanks in advance.     No future appointments.        Protocol Failed/ No Protocol    Requested Prescriptions   Pending Prescriptions Disp Refills    Ursodiol 500 MG Oral Tab 90 tablet 1     Sig: Take 1 tablet (500 mg total) by mouth daily.       There is no refill protocol information for this order            Recent Outpatient Visits              2 weeks ago Essential hypertension    [facility] Scarlet Lopez, RAJWINDER    Office Visit    1 month ago Chronic sinusitis, unspecified location    [facility], [facility] Armando Roy MD    Office Visit    1 month ago Gastroenteritis    [facility], [place] Albert Guerrero,     Office Visit    4 months ago Acute cough    [facility], [place] Ces[place], David B,     Office Visit    6 months ago Gastroesophageal reflux disease with esophagitis without hemorrhage    [facility]    Office Visit

## 2024-03-30 ENCOUNTER — PATIENT MESSAGE (OUTPATIENT)
Dept: FAMILY MEDICINE CLINIC | Facility: CLINIC | Age: 44
End: 2024-03-30

## 2024-04-01 NOTE — TELEPHONE ENCOUNTER
Dr. Guerrero please advise on prescription requested. It is not on active med list.  
Patient notified via MyChart reply    
This is not a medication that I prescribe.  This would come from an ENT specialist.   
no

## 2024-04-08 NOTE — TELEPHONE ENCOUNTER
Please review; protocol failed/No Protocol    Requested Prescriptions   Pending Prescriptions Disp Refills    albuterol 108 (90 Base) MCG/ACT Inhalation Aero Soln 8.5 g 3     Sig: Inhale 2 puffs into the lungs every 4 (four) hours as needed.       Asthma & COPD Medication Protocol Failed - 4/6/2024  9:56 PM        Failed - Asthma Action Score greater than or equal to 20        Failed - AAP/ACT given in last 12 months     No data recorded  No data recorded  No data recorded  No data recorded          Passed - Appointment in past 6 or next 3 months      Recent Outpatient Visits              1 month ago Essential hypertension    [facility], Scarlet Lopez APRN    Office Visit    2 months ago Chronic sinusitis, unspecified location    [facility], Luis Armando Roy MD    Office Visit    2 months ago Gastroenteritis    [facility] [place],     Office Visit    5 months ago Acute cough    [facility], [place] Ces[place], David B,     Office Visit    6 months ago Gastroesophageal reflux disease with esophagitis without hemorrhage    [facility]    Office Visit                           Recent Outpatient Visits              1 month ago Essential hypertension    [facility], Scarlet Lopez APRN    Office Visit    2 months ago Chronic sinusitis, unspecified location    [facility], Luis Armando Roy MD    Office Visit    2 months ago Gastroenteritis    [facility], Albert Fraire DO    Office Visit    5 months ago Acute cough    [facility], [place] Ces[place], David B, DO    Office Visit    6 months ago Gastroesophageal reflux disease with esophagitis without hemorrhage    [facility]    Office Visit

## 2024-04-09 RX ORDER — ALBUTEROL SULFATE 90 UG/1
2 AEROSOL, METERED RESPIRATORY (INHALATION) EVERY 4 HOURS PRN
Qty: 25.5 G | Refills: 1 | Status: SHIPPED | OUTPATIENT
Start: 2024-04-09

## 2024-04-30 ENCOUNTER — NURSE TRIAGE (OUTPATIENT)
Dept: FAMILY MEDICINE CLINIC | Facility: CLINIC | Age: 44
End: 2024-04-30

## 2024-04-30 NOTE — TELEPHONE ENCOUNTER
Action Requested: Summary for Provider     []  Critical Lab, Recommendations Needed  [] Need Additional Advice  []   FYI    []   Need Orders  [x] Need Medications Sent to Pharmacy  []  Other     SUMMARY: Per protocol: Office Visit. I offered several appointments but they do not work with his work schedule. I also offered walk in clinic and he declined because they don't usually help.     Reason for call: Sinusitis and Orders Call  Onset: Data Unavailable    Since Sunday patient has had sinus pressure and congestion. He also has a sore throat. He feels achy. He is requesting a prescription for his symptoms. States that Dr. Guerrero knows his symptoms when he is like this and usually just sends something.     Reason for Disposition   Patient wants to be seen    Protocols used: Sinus Pain and Congestion-A-[place]

## 2024-05-03 RX ORDER — LEVOFLOXACIN 500 MG/1
500 TABLET, FILM COATED ORAL DAILY
Qty: 10 TABLET | Refills: 0 | Status: SHIPPED | OUTPATIENT
Start: 2024-05-03 | End: 2024-05-13

## 2024-05-03 NOTE — TELEPHONE ENCOUNTER
Medication Detail    Medication Quantity Refills Start End   levoFLOXacin (LEVAQUIN) 500 MG Oral Tab 10 tablet 0 5/3/2024 5/13/2024   Sig:   Take 1 tablet (500 mg total) by mouth daily for 10 days.     Route:   Oral     Order #:   [id number]     Medication sent to pharmacy. Advise patient to schedule appointment with ENT as recommended by Dr. Lockhart (referral placed on 3/1/24)

## 2024-05-03 NOTE — TELEPHONE ENCOUNTER
Spoke with the patient (verified name and  ) , advised Yari Perales's note and verbalized understanding . States that ENT do not do much but he will call them for a follow up, and  he would need  a referral.   SEE OTHER ORDERS tab for the ENT referral information.   Informed that the referral is in the system.   RN offered to give the ENT details but declines, he will check his MyChart .        No future appointments.

## 2024-05-04 DIAGNOSIS — M54.50 ACUTE RIGHT-SIDED LOW BACK PAIN, UNSPECIFIED WHETHER SCIATICA PRESENT: ICD-10-CM

## 2024-05-05 NOTE — TELEPHONE ENCOUNTER
Please review; protocol failed. Or has no protocol    Requested Prescriptions   Pending Prescriptions Disp Refills    HYDROcodone-acetaminophen (NORCO)  MG Oral Tab 45 tablet 0     Sig: Take 1 tablet by mouth every 8 (eight) hours as needed.       Controlled Substance Medication Failed - 5/4/2024  9:20 AM        Failed - This medication is a controlled substance - forward to provider to refill              Recent Outpatient Visits              2 months ago Essential hypertension    [facility], Scarlet Lopez APRN    Office Visit    2 months ago Chronic sinusitis, unspecified location    [facility], Luis Armando Roy MD    Office Visit    3 months ago Gastroenteritis    [facility], [place] Albert Guerrero DO    Office Visit    6 months ago Acute cough    [facility], [place] Ces[place]es, David B, DO    Office Visit    7 months ago Gastroesophageal reflux disease with esophagitis without hemorrhage    [facility]    Office Visit

## 2024-05-06 RX ORDER — HYDROCODONE BITARTRATE AND ACETAMINOPHEN 10; 325 MG/1; MG/1
1 TABLET ORAL EVERY 8 HOURS PRN
Qty: 45 TABLET | Refills: 0 | Status: SHIPPED | OUTPATIENT
Start: 2024-05-06

## 2024-05-20 NOTE — TELEPHONE ENCOUNTER
Please review. Protocol Failed; No Protocol    Requested Prescriptions   Pending Prescriptions Disp Refills    albuterol 108 (90 Base) MCG/ACT Inhalation Aero Soln 25.5 g 1     Sig: Inhale 2 puffs into the lungs every 4 (four) hours as needed.       Asthma & COPD Medication Protocol Failed - 5/17/2024  2:07 AM        Failed - Asthma Action Score greater than or equal to 20        Failed - AAP/ACT given in last 12 months     No data recorded  No data recorded  No data recorded  No data recorded          Passed - Appointment in past 6 or next 3 months      Recent Outpatient Visits              2 months ago Essential hypertension    [facility], Scarlet Lopez APRN    Office Visit    3 months ago Chronic sinusitis, unspecified location    [facility], Luis Armando Roy MD    Office Visit    3 months ago Gastroenteritis    [facility] [place],     Office Visit    6 months ago Acute cough    [facility], [place] Ces[place]es, David B, DO    Office Visit    8 months ago Gastroesophageal reflux disease with esophagitis without hemorrhage    [facility]    Office Visit                               Recent Outpatient Visits              2 months ago Essential hypertension    [facility], Scarlet Lopez APRN    Office Visit    3 months ago Chronic sinusitis, unspecified location    [facility], Luis Armando Roy MD    Office Visit    3 months ago Gastroenteritis    [facility], Albert Fraire DO    Office Visit    6 months ago Acute cough    [facility], [place] Ces[place], David B, DO    Office Visit    8 months ago Gastroesophageal reflux disease with esophagitis without hemorrhage    [facility]    Office Visit

## 2024-05-21 ENCOUNTER — PATIENT MESSAGE (OUTPATIENT)
Dept: FAMILY MEDICINE CLINIC | Facility: CLINIC | Age: 44
End: 2024-05-21

## 2024-05-21 RX ORDER — ALBUTEROL SULFATE 90 UG/1
2 AEROSOL, METERED RESPIRATORY (INHALATION) EVERY 4 HOURS PRN
Qty: 25.5 G | Refills: 1 | OUTPATIENT
Start: 2024-05-21

## 2024-05-21 NOTE — TELEPHONE ENCOUNTER
Patient recently received refill for albuterol inhaler on 4/9/24 with one refill  If patient needs new inhaler, he needs to be evaluated in office.   Should not be using Albuterol everyday, it is meant to be a rescue inhaler to take as needed

## 2024-05-22 RX ORDER — ALBUTEROL SULFATE 90 UG/1
2 AEROSOL, METERED RESPIRATORY (INHALATION) EVERY 4 HOURS PRN
Qty: 8.5 G | Refills: 4 | Status: CANCELLED
Start: 2024-05-22

## 2024-05-22 NOTE — TELEPHONE ENCOUNTER
Please call patient and ask how he has been using the albuterol inhalers.   He has received 19 inhalers since 11/26/23

## 2024-05-22 NOTE — TELEPHONE ENCOUNTER
Called [facility] pharmacy. Ibanifed Patient's name and date of birth.  They noted Patient picked up 3 inhalers in the last month. 1 inhaler is supposed to last 2 weeks.  Patient advised again he needs an appointment.  Patient also noted in previous message that he lost several inhalers.

## 2024-05-22 NOTE — TELEPHONE ENCOUNTER
Rx request sent yesterday 5/21/2024 - this was Angella's response  Yari Perales, RAJWINDER    5/21/24 12:45 PM  Note      Patient recently received refill for albuterol inhaler on 4/9/24 with one refill  If patient needs new inhaler, he needs to be evaluated in office.   Should not be using Albuterol everyday, it is meant to be a rescue inhaler to take as needed

## 2024-05-22 NOTE — TELEPHONE ENCOUNTER
From: Kolby Villa  To: Albert Guerrero  Sent: 5/21/2024 5:19 PM CDT  Subject: Refill    I need the refill in the albutoral inhaler because I lost the medication,

## 2024-05-27 RX ORDER — ALBUTEROL SULFATE 90 UG/1
2 AEROSOL, METERED RESPIRATORY (INHALATION) EVERY 4 HOURS PRN
Qty: 8.5 G | Refills: 4 | OUTPATIENT
Start: 2024-05-27

## 2024-05-28 ENCOUNTER — PATIENT MESSAGE (OUTPATIENT)
Dept: FAMILY MEDICINE CLINIC | Facility: CLINIC | Age: 44
End: 2024-05-28

## 2024-05-29 RX ORDER — ALBUTEROL SULFATE 90 UG/1
2 AEROSOL, METERED RESPIRATORY (INHALATION) EVERY 4 HOURS PRN
Qty: 25.5 G | Refills: 1 | Status: CANCELLED
Start: 2024-05-29

## 2024-05-29 NOTE — TELEPHONE ENCOUNTER
Gilbert April, RN 5/29/2024 8:55 AM CDT        ----- Message -----  From: Kolby Villa  Sent: 5/28/2024 8:55 PM CDT  To: Em Triage Support  Subject: Refill     I still have the inhaler available I just need a refill on it when I was sick last month I used it more which doctor said was okay, so can you please refill the prescription

## 2024-05-31 NOTE — TELEPHONE ENCOUNTER
Please see 5/21/24 patient message and message below.    Spoke to [facility] pharmacy. Patient picked up 3 albuterol inhalers 5/9/24

## 2024-06-04 NOTE — TELEPHONE ENCOUNTER
Left Voicemail to call back our office. Office phone number provided with office telephone hours.     [employer] message also sent.

## 2024-06-07 ENCOUNTER — PATIENT MESSAGE (OUTPATIENT)
Dept: OTHER | Age: 44
End: 2024-06-07

## 2024-06-07 RX ORDER — TELMISARTAN 40 MG/1
40 TABLET ORAL DAILY
Qty: 90 TABLET | Refills: 1 | OUTPATIENT
Start: 2024-06-07

## 2024-06-07 RX ORDER — AMLODIPINE BESYLATE 5 MG/1
5 TABLET ORAL DAILY
Qty: 90 TABLET | Refills: 1 | OUTPATIENT
Start: 2024-06-07

## 2024-06-10 DIAGNOSIS — M54.50 ACUTE RIGHT-SIDED LOW BACK PAIN, UNSPECIFIED WHETHER SCIATICA PRESENT: ICD-10-CM

## 2024-06-11 RX ORDER — ALBUTEROL SULFATE 90 UG/1
2 AEROSOL, METERED RESPIRATORY (INHALATION) EVERY 4 HOURS PRN
Qty: 25.5 G | Refills: 1 | Status: SHIPPED | OUTPATIENT
Start: 2024-06-11

## 2024-06-11 NOTE — TELEPHONE ENCOUNTER
Patient called lost the rest of his refills for his inhaler, thinks he or the kids may have thrown them out my mistake    Asking for new script    Please advise

## 2024-06-13 NOTE — TELEPHONE ENCOUNTER
Please review. Protocol Failed; No Protocol      Recent fills: 2/2/2024, 3/1/2024, 5/6/2024  Last Rx written: 5/6/2024  Last office visit: 1/29/2024        Requested Prescriptions   Pending Prescriptions Disp Refills    HYDROcodone-acetaminophen (NORCO)  MG Oral Tab 45 tablet 0     Sig: Take 1 tablet by mouth every 8 (eight) hours as needed.       Controlled Substance Medication Failed - 6/10/2024 10:51 AM        Failed - This medication is a controlled substance - forward to provider to refill                 Recent Outpatient Visits              3 months ago Essential hypertension    [facility], Scarlet Lopez APRN    Office Visit    4 months ago Chronic sinusitis, unspecified location    [facility], Luis Armando Roy MD    Office Visit    4 months ago Gastroenteritis    [facility], Albert Fraire DO    Office Visit    7 months ago Acute cough    [facility] [place] Ces[place], David B, DO    Office Visit    8 months ago Gastroesophageal reflux disease with esophagitis without hemorrhage    [facility]    Office Visit

## 2024-06-14 RX ORDER — HYDROCODONE BITARTRATE AND ACETAMINOPHEN 10; 325 MG/1; MG/1
1 TABLET ORAL EVERY 8 HOURS PRN
Qty: 45 TABLET | Refills: 0 | Status: SHIPPED | OUTPATIENT
Start: 2024-06-14

## 2024-06-24 ENCOUNTER — OFFICE VISIT (OUTPATIENT)
Dept: FAMILY MEDICINE CLINIC | Facility: CLINIC | Age: 44
End: 2024-06-24

## 2024-06-24 VITALS
HEIGHT: 69 IN | OXYGEN SATURATION: 96 % | BODY MASS INDEX: 38.66 KG/M2 | DIASTOLIC BLOOD PRESSURE: 82 MMHG | SYSTOLIC BLOOD PRESSURE: 141 MMHG | HEART RATE: 86 BPM | WEIGHT: 261 LBS

## 2024-06-24 DIAGNOSIS — J32.9 CHRONIC SINUSITIS, UNSPECIFIED LOCATION: ICD-10-CM

## 2024-06-24 DIAGNOSIS — R06.09 DYSPNEA ON EXERTION: ICD-10-CM

## 2024-06-24 DIAGNOSIS — I10 ESSENTIAL HYPERTENSION: Primary | ICD-10-CM

## 2024-06-24 PROCEDURE — 99214 OFFICE O/P EST MOD 30 MIN: CPT | Performed by: FAMILY MEDICINE

## 2024-06-24 PROCEDURE — 3079F DIAST BP 80-89 MM HG: CPT | Performed by: FAMILY MEDICINE

## 2024-06-24 PROCEDURE — 3008F BODY MASS INDEX DOCD: CPT | Performed by: FAMILY MEDICINE

## 2024-06-24 PROCEDURE — 3077F SYST BP >= 140 MM HG: CPT | Performed by: FAMILY MEDICINE

## 2024-06-24 RX ORDER — SULFAMETHOXAZOLE AND TRIMETHOPRIM 800; 160 MG/1; MG/1
1 TABLET ORAL 2 TIMES DAILY
Qty: 20 TABLET | Refills: 0 | Status: SHIPPED | OUTPATIENT
Start: 2024-06-24

## 2024-06-24 NOTE — PROGRESS NOTES
Subjective:   Kolby Villa is a 43 year old male who presents for Shortness Of Breath (All day feels shortness of breath) and Nasal Congestion (Left nostril clogged, feels a big clot of blood from left side of nostril)       History/Other:    Chief Complaint Reviewed and Verified  Nursing Notes Reviewed and   Verified  Tobacco Reviewed  Allergies Reviewed  Medications Reviewed    Medical History Reviewed  Surgical History Reviewed  Family History   Reviewed  Social History Reviewed         Tobacco:  He has never smoked tobacco.    Current Outpatient Medications   Medication Sig Dispense Refill    sulfamethoxazole-trimethoprim DS (BACTRIM DS) 800-160 MG Oral Tab per tablet Take 1 tablet by mouth 2 (two) times daily. 20 tablet 0    HYDROcodone-acetaminophen (NORCO)  MG Oral Tab Take 1 tablet by mouth every 8 (eight) hours as needed. 45 tablet 0    albuterol 108 (90 Base) MCG/ACT Inhalation Aero Soln Inhale 2 puffs into the lungs every 4 (four) hours as needed. 25.5 g 1    Ursodiol 500 MG Oral Tab Take 1 tablet (500 mg total) by mouth daily. 90 tablet 3    telmisartan 40 MG Oral Tab Take 1 tablet (40 mg total) by mouth daily. 90 tablet 1    Telmisartan-amLODIPine 40-5 MG Oral Tab Take 1 tablet by mouth daily. 90 tablet 1    levocetirizine 5 MG Oral Tab Take 1 tablet (5 mg total) by mouth every evening. 90 tablet 3    pravastatin 10 MG Oral Tab Take 1 tablet (10 mg total) by mouth nightly. 90 tablet 3    amLODIPine 5 MG Oral Tab Take 1 tablet (5 mg total) by mouth daily. 90 tablet 1         Review of Systems:  Review of Systems   HENT:  Positive for congestion.    Respiratory:  Positive for shortness of breath.          Objective:   /82   Pulse 86   Ht 5' 9\" (1.753 m)   Wt 261 lb (118.4 kg)   SpO2 96%   BMI 38.54 kg/m²  Estimated body mass index is 38.54 kg/m² as calculated from the following:    Height as of this encounter: 5' 9\" (1.753 m).    Weight as of this encounter: 261 lb (118.4  kg).  Physical Exam  Vitals reviewed.   HENT:      Right Ear: Tympanic membrane normal.      Left Ear: Tympanic membrane normal.      Nose: Congestion present.      Mouth/Throat:      Mouth: Mucous membranes are moist.   Pulmonary:      Effort: Pulmonary effort is normal.      Breath sounds: Normal breath sounds.           Assessment & Plan:   1. Essential hypertension (Primary)  2. Chronic sinusitis, unspecified location  -     ENT - INTERNAL  3. Dyspnea on exertion  -     Pulmonary Function Test; Future; Expected date: 06/24/2024  Other orders  -     Sulfamethoxazole-Trimethoprim; Take 1 tablet by mouth 2 (two) times daily.  Dispense: 20 tablet; Refill: 0    Due to follow up with ENT>  he likely would benefit from sinus surgery.  Go back on abx.  Also, will check PFT to see if there is any lung pathology or is it just his sinus issue causing the restricted breathing.       No follow-ups on file.    Albert Guerrero DO, 6/24/2024, 6:07 PM

## 2024-07-01 ENCOUNTER — OFFICE VISIT (OUTPATIENT)
Dept: OTOLARYNGOLOGY | Facility: CLINIC | Age: 44
End: 2024-07-01

## 2024-07-01 VITALS — BODY MASS INDEX: 38.66 KG/M2 | HEIGHT: 69 IN | WEIGHT: 261 LBS

## 2024-07-01 DIAGNOSIS — J34.2 NASAL SEPTAL DEVIATION: ICD-10-CM

## 2024-07-01 DIAGNOSIS — J34.89 NASAL OBSTRUCTION: ICD-10-CM

## 2024-07-01 DIAGNOSIS — G47.33 OSA (OBSTRUCTIVE SLEEP APNEA): Primary | ICD-10-CM

## 2024-07-01 DIAGNOSIS — J32.9 CHRONIC SINUSITIS, UNSPECIFIED LOCATION: ICD-10-CM

## 2024-07-01 DIAGNOSIS — J34.3 HYPERTROPHY OF BOTH INFERIOR NASAL TURBINATES: ICD-10-CM

## 2024-07-01 PROCEDURE — 3008F BODY MASS INDEX DOCD: CPT | Performed by: STUDENT IN AN ORGANIZED HEALTH CARE EDUCATION/TRAINING PROGRAM

## 2024-07-01 PROCEDURE — 31231 NASAL ENDOSCOPY DX: CPT | Performed by: STUDENT IN AN ORGANIZED HEALTH CARE EDUCATION/TRAINING PROGRAM

## 2024-07-01 PROCEDURE — 99214 OFFICE O/P EST MOD 30 MIN: CPT | Performed by: STUDENT IN AN ORGANIZED HEALTH CARE EDUCATION/TRAINING PROGRAM

## 2024-07-01 RX ORDER — BUDESONIDE 1 MG/2ML
1 INHALANT ORAL 2 TIMES DAILY
Qty: 60 EACH | Refills: 0 | Status: SHIPPED | OUTPATIENT
Start: 2024-07-01 | End: 2024-07-31

## 2024-07-01 NOTE — PROGRESS NOTES
Kolby Villa is a 43 year old male.   Chief Complaint   Patient presents with    Sinus Problem     F/U pt currently prescribed antibiotic by PCP for another sinus infection, pt had sinus surgery 3 years ago      HPI:   43-year-old with moderate sleep apnea and history of endoscopic sinus surgery for nasal polyps is having increased difficulty breathing through his nose and he cannot use the CPAP and is having apnea at night    Current Outpatient Medications   Medication Sig Dispense Refill    Budesonide 1 MG/2ML Inhalation Suspension Take 2 mL (1 mg total) by nebulization 2 (two) times daily. Mix 1 raspule with the 240cc of saline as instructed and irrigate 1/2 of bottle on each side of nose 1-2 times a day. 60 each 0    sulfamethoxazole-trimethoprim DS (BACTRIM DS) 800-160 MG Oral Tab per tablet Take 1 tablet by mouth 2 (two) times daily. 20 tablet 0    HYDROcodone-acetaminophen (NORCO)  MG Oral Tab Take 1 tablet by mouth every 8 (eight) hours as needed. 45 tablet 0    albuterol 108 (90 Base) MCG/ACT Inhalation Aero Soln Inhale 2 puffs into the lungs every 4 (four) hours as needed. 25.5 g 1    Ursodiol 500 MG Oral Tab Take 1 tablet (500 mg total) by mouth daily. 90 tablet 3    telmisartan 40 MG Oral Tab Take 1 tablet (40 mg total) by mouth daily. 90 tablet 1    amLODIPine 5 MG Oral Tab Take 1 tablet (5 mg total) by mouth daily. 90 tablet 1    Telmisartan-amLODIPine 40-5 MG Oral Tab Take 1 tablet by mouth daily. 90 tablet 1    levocetirizine 5 MG Oral Tab Take 1 tablet (5 mg total) by mouth every evening. 90 tablet 3    pravastatin 10 MG Oral Tab Take 1 tablet (10 mg total) by mouth nightly. 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[employer]   Tobacco Use    Smoking status: Never    Smokeless tobacco: Never    Tobacco comments:     Girlfriend smokes outside   Vaping Use    Vaping status: Never Used   Substance and Sexual Activity    Alcohol use: Never     Alcohol/week: 0.0 standard drinks of alcohol    Drug use: No   Other Topics Concern    Caffeine Concern Yes     Comment: 2 cups tea daily      Past Surgical History:   Procedure Laterality Date    Excision turbinate,submucous  02/14/2020    Nasal scope,bx/rmv polyp/debrid  02/14/2020    Nasal scopy,remv part ethmoid  02/14/2020    Nasal scopy,rmv tiss maxill sinus  02/14/2020         EXAM:   Ht 5' 9\" (1.753 m)   Wt 261 lb (118.4 kg)   BMI 38.54 kg/m²     System Details   Skin Inspection - Normal.   Constitutional Overall appearance - Normal.   Head/Face Symmetric, TMJ tenderness not present    Eyes EOMI, PERRL   Right ear:  Canal clear, TM intact, no JASS   Left ear:  Canal clear, TM intact, no JASS   Nose: Septum deviated to the right, inferior turbinates enlarged, nasal valves without collapse    Oral cavity/Oropharynx: No lesions or masses on inspection or palpation, tonsils symmetric    Neck: Soft without LAD, thyroid not enlarged  Voice clear/ no stridor   Other:      SCOPES AND PROCEDURES:     Nasal Endoscopy Procedure Note     Due to inability for adequate examination of the nose and nasopharynx and need for magnification to perform the examination, endoscopy was performed.  Risks and benefits were discussed with patient/family and they have given verbal consent to proceed.    Pre-operative Diagnosis:   1. MADIE (obstructive sleep apnea)    2. Nasal obstruction    3. Nasal septal deviation    4. Chronic sinusitis, unspecified location    5. Hypertrophy of both inferior nasal turbinates        Post-operative Diagnosis:  Same    Procedure: Diagnostic nasal endoscopy    Anesthesia: Topical anesthetic [place]     Surgeon Luis Armando Lopez MD    EBL: 0cc    Procedure Detail & Findings:     After placement of topical anesthetic intranasally the endoscope was inserted into each nares and driven through the nasal cavity into the nasopharynx. The following findings were noted:    Septum: Moderate to severe deviation to the right as well as of the superior septum onto the right hypertrophy on both sides  Inferior turbinates:  hypertrophy on both sides  Middle meatus: Patent  Middle turbinates: Absent on the left  Purulence: None noted  Polyps: None noted  Nasopharynx and eustachian tube: No masses  Other: The middle and superior meatus, the turbinates, and the spheno-ethmoid recess were inspected and seen to be without significant abnormal findings.   Overall both sinonasal cavities appear to be patent with no return of the polyps and no purulence.  I could see into the left maxillary sinus although synechiae obscuring the view of the right maxillary sinus    Condition: Stable    Complications: Patient tolerated the procedure well with no immediate complication.    Luis Armando Lopez MD    AUDIOGRAM AND IMAGING:     CT scan of his sinuses with a septal deviation to the right and mild mucosal thickening of the maxillary sinuses with possible cyst versus mild polypoid disease within both maxillary sinuses    IMPRESSION:   1. MADIE (obstructive sleep apnea)    2. Nasal obstruction    3. Nasal septal deviation    4. Chronic sinusitis, unspecified location    5. Hypertrophy of both inferior nasal turbinates       Recommendations:  -43-year-old with moderate sleep apnea and history of endoscopic sinus surgery for nasal polyps is having increased difficulty breathing through his nose and he cannot use the CPAP and is having apnea at night  -Nasal endoscopy today without polyps or purulence  -Suspect his septal deviation and turbinate hypertrophy are likely  contributing to the bulk of his nasal obstruction and intolerance to CPAP  -Will begin budesonide sinonasal irrigations and discussed the utility of septoplasty in the revision setting to possibly relieve his nasal obstruction and increase his CPAP tolerance  -He will follow-up in 3 to 4 weeks to assess his response to the irrigations    The patient indicates understanding of these issues and agrees to the plan.      Luis Armando Lopez MD  7/1/2024  5:19 PM

## 2024-07-03 ENCOUNTER — TELEPHONE (OUTPATIENT)
Dept: OTOLARYNGOLOGY | Facility: CLINIC | Age: 44
End: 2024-07-03

## 2024-07-03 NOTE — TELEPHONE ENCOUNTER
[facility] pharmacy calling for clarification on instructions for Budesonide 1 MG/2ML Inhalation Suspension medication. They state it has two sets of instructions on how to use and they need clarification on how patient is supposed to use it. Please call [facility] pharmacy at [phone] .       Current Outpatient Medications:     Budesonide 1 MG/2ML Inhalation Suspension, Take 2 mL (1 mg total) by nebulization 2 (two) times daily. Mix 1 raspule with the 240cc of saline as instructed and irrigate 1/2 of bottle on each side of nose 1-2 times a day., Disp: 60 each, Rfl: 0

## 2024-07-05 NOTE — TELEPHONE ENCOUNTER
Submitted Budesonide to [facility] [id number], medication approved, see note below:  No PA required, Prescription is within prescribing limits.    I left a message for the pharmacist regarding this approval.

## 2024-07-12 RX ORDER — ALBUTEROL SULFATE 90 UG/1
2 AEROSOL, METERED RESPIRATORY (INHALATION) EVERY 4 HOURS PRN
Qty: 18 G | Refills: 1 | Status: SHIPPED | OUTPATIENT
Start: 2024-07-12

## 2024-07-12 NOTE — TELEPHONE ENCOUNTER
Please Review. Protocol Failed; No Protocol     Requested Prescriptions   Pending Prescriptions Disp Refills    ALBUTEROL 108 (90 Base) MCG/ACT Inhalation Aero Soln [Pharmacy Med Name: ALBUTEROL HFA INH (200 PUFFS) 8.5GM] 8.5 g 0     Sig: INHALE 2 PUFFS INTO THE LUNGS EVERY 4 HOURS AS NEEDED FOR WHEEZING       Asthma & COPD Medication Protocol Failed - 7/9/2024 10:18 PM        Failed - Asthma Action Score greater than or equal to 20        Failed - AAP/ACT given in last 12 months     No data recorded  No data recorded  No data recorded  No data recorded          Passed - Appointment in past 6 or next 3 months      Recent Outpatient Visits              1 week ago MADIE (obstructive sleep apnea)    [facility], Luis Armando Valdez MD    Office Visit    2 weeks ago Essential hypertension    [facility], Albert Fraire DO    Office Visit    4 months ago Essential hypertension    [facility], Scarlet Lopez APRN    Office Visit    5 months ago Chronic sinusitis, unspecified location    [facility], Luis Armando Roy MD    Office Visit    5 months ago Gastroenteritis    [facility], Albert Fraire DO    Office Visit                               Recent Outpatient Visits              1 week ago MADIE (obstructive sleep apnea)    [facility], Luis Armando Valdez MD    Office Visit    2 weeks ago Essential hypertension    [facility], Albert Fraire DO    Office Visit    4 months ago Essential hypertension    [facility], Scarlet Lopez APRN    Office Visit    5 months ago Chronic sinusitis, unspecified location    [facility], Luis Armando Roy MD    Office Visit    5  months ago Gastroenteritis    [facility] [address], Albert Fraire,     Office Visit

## 2024-07-22 DIAGNOSIS — M54.50 ACUTE RIGHT-SIDED LOW BACK PAIN, UNSPECIFIED WHETHER SCIATICA PRESENT: ICD-10-CM

## 2024-07-25 RX ORDER — ALBUTEROL SULFATE 90 UG/1
2 AEROSOL, METERED RESPIRATORY (INHALATION) EVERY 4 HOURS PRN
Qty: 18 G | Refills: 1 | OUTPATIENT
Start: 2024-07-25

## 2024-07-25 NOTE — TELEPHONE ENCOUNTER
Please review. Protocol Failed; No Protocol      Recent fills: 3/1/2024, 5/6/2024, 6/14/2024  Last Rx written: 6/14/2024  Last office visit: 6/24/2024        Requested Prescriptions   Pending Prescriptions Disp Refills    HYDROcodone-acetaminophen (NORCO)  MG Oral Tab 45 tablet 0     Sig: Take 1 tablet by mouth every 8 (eight) hours as needed.       Controlled Substance Medication Failed - 7/22/2024 10:47 PM        Failed - This medication is a controlled substance - forward to provider to refill                 Recent Outpatient Visits              3 weeks ago MADIE (obstructive sleep apnea)    [facility], Luis Armando Valdez MD    Office Visit    1 month ago Essential hypertension    [facility], Albert Fraire DO    Office Visit    4 months ago Essential hypertension    [facility], Scarlet Lopez APRN    Office Visit    5 months ago Chronic sinusitis, unspecified location    [facility], Luis Armando Roy MD    Office Visit    5 months ago Gastroenteritis    [facility], Albert Fraire DO    Office Visit

## 2024-07-26 RX ORDER — HYDROCODONE BITARTRATE AND ACETAMINOPHEN 10; 325 MG/1; MG/1
1 TABLET ORAL EVERY 8 HOURS PRN
Qty: 45 TABLET | Refills: 0 | Status: SHIPPED | OUTPATIENT
Start: 2024-07-26

## 2024-08-01 ENCOUNTER — OFFICE VISIT (OUTPATIENT)
Dept: OTOLARYNGOLOGY | Facility: CLINIC | Age: 44
End: 2024-08-01

## 2024-08-01 DIAGNOSIS — J34.2 NASAL SEPTAL DEVIATION: ICD-10-CM

## 2024-08-01 DIAGNOSIS — J34.89 NASAL OBSTRUCTION: ICD-10-CM

## 2024-08-01 DIAGNOSIS — G47.33 OSA (OBSTRUCTIVE SLEEP APNEA): Primary | ICD-10-CM

## 2024-08-01 DIAGNOSIS — J34.3 HYPERTROPHY OF BOTH INFERIOR NASAL TURBINATES: ICD-10-CM

## 2024-08-01 PROCEDURE — G2211 COMPLEX E/M VISIT ADD ON: HCPCS | Performed by: STUDENT IN AN ORGANIZED HEALTH CARE EDUCATION/TRAINING PROGRAM

## 2024-08-01 PROCEDURE — 99213 OFFICE O/P EST LOW 20 MIN: CPT | Performed by: STUDENT IN AN ORGANIZED HEALTH CARE EDUCATION/TRAINING PROGRAM

## 2024-08-01 NOTE — PROGRESS NOTES
Kolby Villa is a 44 year old male.   Chief Complaint   Patient presents with    Follow - Up     Patient is here due to apnea follow up, reports slight improvement with breathing. Reports still having  issues breathing at night due to cpap mask     HPI:   Is a 44-year-old who follows up with regards to his moderate sleep apnea and intolerance to CPAP.  He is interested in an oral appliance and looking for options    Current Outpatient Medications   Medication Sig Dispense Refill    HYDROcodone-acetaminophen (NORCO)  MG Oral Tab Take 1 tablet by mouth every 8 (eight) hours as needed. 45 tablet 0    albuterol 108 (90 Base) MCG/ACT Inhalation Aero Soln Inhale 2 puffs into the lungs every 4 (four) hours as needed for Wheezing. 18 g 1    sulfamethoxazole-trimethoprim DS (BACTRIM DS) 800-160 MG Oral Tab per tablet Take 1 tablet by mouth 2 (two) times daily. 20 tablet 0    Ursodiol 500 MG Oral Tab Take 1 tablet (500 mg total) by mouth daily. 90 tablet 3    telmisartan 40 MG Oral Tab Take 1 tablet (40 mg total) by mouth daily. 90 tablet 1    amLODIPine 5 MG Oral Tab Take 1 tablet (5 mg total) by mouth daily. 90 tablet 1    Telmisartan-amLODIPine 40-5 MG Oral Tab Take 1 tablet by mouth daily. 90 tablet 1    levocetirizine 5 MG Oral Tab Take 1 tablet (5 mg total) by mouth every evening. 90 tablet 3    pravastatin 10 MG Oral Tab Take 1 tablet (10 mg total) by mouth nightly. 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[employer]   Tobacco Use    Smoking status: Never    Smokeless tobacco: Never     Tobacco comments:     Girlfriend smokes outside   Vaping Use    Vaping status: Never Used   Substance and Sexual Activity    Alcohol use: Never     Alcohol/week: 0.0 standard drinks of alcohol    Drug use: No   Other Topics Concern    Caffeine Concern Yes     Comment: 2 cups tea daily      Past Surgical History:   Procedure Laterality Date    Excision turbinate,submucous  02/14/2020    Nasal scope,bx/rmv polyp/debrid  02/14/2020    Nasal scopy,remv part ethmoid  02/14/2020    Nasal scopy,rmv tiss maxill sinus  02/14/2020         EXAM:   There were no vitals taken for this visit.    System Details   Skin Inspection - Normal.   Constitutional Overall appearance - Normal.   Head/Face Symmetric, TMJ tenderness not present    Eyes EOMI, PERRL   Right ear:  Canal clear, TM intact, no JASS   Left ear:  Canal clear, TM intact, no JASS   Nose: Septum deviated to the right, inferior turbinates enlarged, nasal valves without collapse    Oral cavity/Oropharynx: No lesions or masses on inspection or palpation, tonsils symmetric    Neck: Soft without LAD, thyroid not enlarged  Voice clear/ no stridor   Other:      SCOPES AND PROCEDURES:         AUDIOGRAM AND IMAGING:         IMPRESSION:   1. MADIE (obstructive sleep apnea)    2. Nasal septal deviation    3. Nasal obstruction    4. Hypertrophy of both inferior nasal turbinates       Recommendations:  -Has moderate sleep apnea intolerant of CPAP.  Also with a septal deviation to moderate degree on the right side which may be impacting his CPAP tolerance  -Suggested Pure sleep oral appliance online versus a mail in prescription for a more custom fit device.  He looked into the [facility] but was cost prohibitive  -Could consider nasal mutes or nasal dilators to insert into his nose during sleep as well  -If he continues to have issues may consider referral to a sleep surgeon for more advanced surgical options  -Like him to follow-up in 6 to 8 weeks to assess his  progress  -Could also consider revision septoplasty and turbinate reduction in the setting    The patient indicates understanding of these issues and agrees to the plan.  Longitudinal care will be provided with follow-up and response to therapy and possible additional intervention    Luis Armando Lopez MD  8/1/2024  4:41 PM

## 2024-08-09 RX ORDER — ALBUTEROL SULFATE 90 UG/1
2 AEROSOL, METERED RESPIRATORY (INHALATION) EVERY 4 HOURS PRN
Qty: 18 G | Refills: 1 | OUTPATIENT
Start: 2024-08-09

## 2024-08-09 NOTE — TELEPHONE ENCOUNTER
Please review.  Protocol failed / Has no protocol.     Requested Prescriptions   Pending Prescriptions Disp Refills    telmisartan 40 MG Oral Tab 90 tablet 1     Sig: Take 1 tablet (40 mg total) by mouth daily.       Hypertension Medications Protocol Failed - 8/5/2024  9:12 AM        Failed - Last BP reading less than 140/90     BP Readings from Last 1 Encounters:   06/24/24 141/82               Passed - CMP or BMP in past 12 months        Passed - In person appointment or virtual visit in the past 12 mos or appointment in next 3 mos     Recent Outpatient Visits              1 week ago MADIE (obstructive sleep apnea)    [facility], Luis Armando Roy MD    Office Visit    1 month ago MADIE (obstructive sleep apnea)    [facility], Luis Armando Valdez MD    Office Visit    1 month ago Essential hypertension    [facility], Albert Fraire DO    Office Visit    5 months ago Essential hypertension    [facility], Scarlet Lopez APRN    Office Visit    6 months ago Chronic sinusitis, unspecified location    [facility], Luis Armando Roy MD    Office Visit                      Passed - EGFRCR or GFRNAA > 50     GFR Evaluation  EGFRCR: 115 , resulted on 1/30/2024            amLODIPine 5 MG Oral Tab 90 tablet 1     Sig: Take 1 tablet (5 mg total) by mouth daily.       Hypertension Medications Protocol Failed - 8/5/2024  9:12 AM        Failed - Last BP reading less than 140/90     BP Readings from Last 1 Encounters:   06/24/24 141/82               Passed - CMP or BMP in past 12 months        Passed - In person appointment or virtual visit in the past 12 mos or appointment in next 3 mos     Recent Outpatient Visits              1 week ago MADIE (obstructive sleep apnea)    [facility], Luis Armando Roy  MD    Office Visit    1 month ago MADIE (obstructive sleep apnea)    [facility], Luis Armando Valdez MD    Office Visit    1 month ago Essential hypertension    [facility], Albert Fraire DO    Office Visit    5 months ago Essential hypertension    [facility], Scarlet Lopez APRN    Office Visit    6 months ago Chronic sinusitis, unspecified location    [facility], Luis Armando Roy MD    Office Visit                      Passed - EGFRCR or GFRNAA > 50     GFR Evaluation  EGFRCR: 115 , resulted on 1/30/2024            Ursodiol 500 MG Oral Tab 90 tablet 3     Sig: Take 1 tablet (500 mg total) by mouth daily.       There is no refill protocol information for this order          Recent Outpatient Visits              1 week ago MADIE (obstructive sleep apnea)    [facility], Luis Armando Roy MD    Office Visit    1 month ago MADIE (obstructive sleep apnea)    [facility], Luis Armando Valdez MD    Office Visit    1 month ago Essential hypertension    [facility], Albert Fraire DO    Office Visit    5 months ago Essential hypertension    [facility], Scarlet Lopez APRN    Office Visit    6 months ago Chronic sinusitis, unspecified location    [facility], Luis Armando Roy MD    Office Visit

## 2024-08-12 RX ORDER — AMLODIPINE BESYLATE 5 MG/1
5 TABLET ORAL DAILY
Qty: 90 TABLET | Refills: 1 | Status: SHIPPED | OUTPATIENT
Start: 2024-08-12

## 2024-08-12 RX ORDER — URSODIOL 500 MG/1
500 TABLET, FILM COATED ORAL DAILY
Qty: 90 TABLET | Refills: 1 | Status: SHIPPED | OUTPATIENT
Start: 2024-08-12

## 2024-08-12 RX ORDER — TELMISARTAN 40 MG/1
40 TABLET ORAL DAILY
Qty: 90 TABLET | Refills: 1 | Status: SHIPPED | OUTPATIENT
Start: 2024-08-12

## 2024-08-26 DIAGNOSIS — M54.50 ACUTE RIGHT-SIDED LOW BACK PAIN, UNSPECIFIED WHETHER SCIATICA PRESENT: ICD-10-CM

## 2024-08-27 NOTE — TELEPHONE ENCOUNTER
Please Review. Protocol Failed; No Protocol   Medication(s) to Refill:   Requested Prescriptions     Pending Prescriptions Disp Refills    HYDROcodone-acetaminophen (NORCO)  MG Oral Tab 45 tablet 0     Sig: Take 1 tablet by mouth every 8 (eight) hours as needed.         Reason for Medication Refill being sent to Provider / Reason Protocol Failed:  [x] Controlled Substance       Quantity: 45  Last Time Medication was Filled:  07/26/2024, 06/14/2024, 05/06/2024  Last Time Medication was Written : 07/26/2024      Last Office Visit : 06/24/2024              Recent Outpatient Visits              3 weeks ago MADIE (obstructive sleep apnea)    [facility], Luis Armando Roy MD    Office Visit    1 month ago MADIE (obstructive sleep apnea)    [facility], Luis Armando Valdez MD    Office Visit    2 months ago Essential hypertension    [facility], Albert Fraire DO    Office Visit    5 months ago Essential hypertension    [facility], Scarlet Lopez APRN    Office Visit    6 months ago Chronic sinusitis, unspecified location    [facility], Luis Armando Roy MD    Office Visit

## 2024-08-29 ENCOUNTER — NURSE TRIAGE (OUTPATIENT)
Dept: FAMILY MEDICINE CLINIC | Facility: CLINIC | Age: 44
End: 2024-08-29

## 2024-08-29 DIAGNOSIS — R30.0 DYSURIA: Primary | ICD-10-CM

## 2024-08-29 NOTE — TELEPHONE ENCOUNTER
Please reply to pool: EM RN TRIAGE  Action Requested: Summary for Provider     []  Critical Lab, Recommendations Needed  [] Need Additional Advice  []   FYI    [x]   Need Orders  [] Need Medications Sent to Pharmacy  []  Other     SUMMARY: Patient contacts clinic regarding mychart message from 08/23.  Complains of burning with urination and urinary frequency for over a week.  Denies fever, body aches or chills.  Denies flank or side pain.  Denies nausea or vomiting.  He goes on to report sinus congestion and green nasal drainage.  Post nasal drip that his making him cough.  States \"I don't have covid\" nurse could not ascertain if he has actually checked a home covid test.  He denies chest pain but does have some shortness of breath with coughing.  He is not concerned with sinus symptoms, taking over the counter and using inhaler as prescribed.  He declines office visit.  Requesting orders to check urine.  Hydration advised.  Please advise further.     Reason for call: Dysuria and Sinus Problem  Onset: Data Unavailable                           Reason for Disposition   Urinating more frequently than usual (i.e., frequency)   Sinus congestion as part of a cold, present < 10 days    Protocols used: Urinary Symptoms-A-OH, Sinus Pain and Congestion-A-OH

## 2024-08-30 RX ORDER — HYDROCODONE BITARTRATE AND ACETAMINOPHEN 10; 325 MG/1; MG/1
1 TABLET ORAL EVERY 8 HOURS PRN
Qty: 45 TABLET | Refills: 0 | Status: SHIPPED | OUTPATIENT
Start: 2024-08-30

## 2024-08-30 NOTE — TELEPHONE ENCOUNTER
Last read by Kolby Villa at  8:56 AM on 8/30/2024.    Pt report given to [facility] RN.  Pt repeat labs drawn and sent.  Pt CIWA and vitals performed and documented.  Small improvement in CIWA score from 7 to 6.  Pt transported to [facility] by PCA.

## 2024-08-30 NOTE — TELEPHONE ENCOUNTER
Detailed message left notifying patient per TESSIE.  Mychart messge sent.  Encouraged to call with questions or concerns.

## 2024-08-31 ENCOUNTER — LAB ENCOUNTER (OUTPATIENT)
Dept: LAB | Age: 44
End: 2024-08-31
Attending: FAMILY MEDICINE
Payer: COMMERCIAL

## 2024-08-31 DIAGNOSIS — R30.0 DYSURIA: ICD-10-CM

## 2024-08-31 LAB
BILIRUB UR QL: NEGATIVE
COLOR UR: YELLOW
GLUCOSE UR-MCNC: NORMAL MG/DL
HGB UR QL STRIP.AUTO: NEGATIVE
LEUKOCYTE ESTERASE UR QL STRIP.AUTO: 250
NITRITE UR QL STRIP.AUTO: NEGATIVE
PH UR: 6 [PH] (ref 5–8)
PROT UR-MCNC: 30 MG/DL
SP GR UR STRIP: >1.03 (ref 1–1.03)
UROBILINOGEN UR STRIP-ACNC: 2

## 2024-08-31 PROCEDURE — 81001 URINALYSIS AUTO W/SCOPE: CPT

## 2024-08-31 PROCEDURE — 87086 URINE CULTURE/COLONY COUNT: CPT

## 2024-09-01 ENCOUNTER — TELEPHONE (OUTPATIENT)
Dept: FAMILY MEDICINE CLINIC | Facility: CLINIC | Age: 44
End: 2024-09-01

## 2024-09-01 RX ORDER — CIPROFLOXACIN 500 MG/1
500 TABLET, FILM COATED ORAL 2 TIMES DAILY
Qty: 14 TABLET | Refills: 0 | Status: SHIPPED | OUTPATIENT
Start: 2024-09-01

## 2024-09-01 NOTE — TELEPHONE ENCOUNTER
On-call  Received a page from patient yesterday that he saw his urine test results and could not understand  He states he had called his PCP and requested orders for urine test as he has had some burning with urination    Reviewed urinalysis results which showed leuk esterase was positive at 250 with WBCs    Urine culture pending.  Patient has never had any prior UTIs.  He denies any back pain fever or chills.  He states he is monogamous with his wife.  Informed patient await urine culture which came back in the next 1 to 2 days.  Monitor symptoms and push fluids.  This could be contamination as well    I noted that results were reviewed today by his PCP and antibiotic ciprofloxacin has been sent to pharmacy

## 2024-09-11 ENCOUNTER — NURSE TRIAGE (OUTPATIENT)
Dept: FAMILY MEDICINE CLINIC | Facility: CLINIC | Age: 44
End: 2024-09-11

## 2024-09-11 DIAGNOSIS — N52.9 DIFFICULTY ATTAINING ERECTION: ICD-10-CM

## 2024-09-11 DIAGNOSIS — R35.0 URINARY FREQUENCY: Primary | ICD-10-CM

## 2024-09-12 ENCOUNTER — TELEPHONE (OUTPATIENT)
Dept: FAMILY MEDICINE CLINIC | Facility: CLINIC | Age: 44
End: 2024-09-12

## 2024-09-12 ENCOUNTER — PATIENT MESSAGE (OUTPATIENT)
Dept: FAMILY MEDICINE CLINIC | Facility: CLINIC | Age: 44
End: 2024-09-12

## 2024-09-12 NOTE — TELEPHONE ENCOUNTER
Patient calling,verified name and date of birth.    Patient calling back for status of yesterday's message.  Reviewed Dr Carrillo's  9-12-24 8:28 am lab result note with patient that urinalysis and urine culture are in an acceptable range.    Patient reports that he has persistent urinary frequency, burning with urination and  new difficulty maintaining an erection that were not relieved after completing antibiotics.    Dr Guerrero, please advise.

## 2024-09-12 NOTE — TELEPHONE ENCOUNTER
Duplicate message, please see 9-11-24 encounter \"dysuria\". Closing this encounter.    From: Kolby Villa  To: Albert Guerrero  Sent: 9/12/2024  8:53 AM CDT  Subject: Urine test    I am still having the same issues, I left a message with someone a couple days ago . Can you order another test to check if the infection is still there

## 2024-09-12 NOTE — TELEPHONE ENCOUNTER
Advised patient of Dr Guerrero's note. Patient would like to have an order to recheck his urine to rule out infection and a referral for a urologist. Both orders are pending.

## 2024-09-13 ENCOUNTER — LAB ENCOUNTER (OUTPATIENT)
Dept: LAB | Age: 44
End: 2024-09-13
Attending: FAMILY MEDICINE
Payer: COMMERCIAL

## 2024-09-13 DIAGNOSIS — R35.0 URINARY FREQUENCY: ICD-10-CM

## 2024-09-13 LAB
BILIRUB UR QL: NEGATIVE
CLARITY UR: CLEAR
COLOR UR: YELLOW
GLUCOSE UR-MCNC: NORMAL MG/DL
HGB UR QL STRIP.AUTO: NEGATIVE
LEUKOCYTE ESTERASE UR QL STRIP.AUTO: NEGATIVE
NITRITE UR QL STRIP.AUTO: NEGATIVE
PH UR: 5.5 [PH] (ref 5–8)
PROT UR-MCNC: 30 MG/DL
SP GR UR STRIP: >1.03 (ref 1–1.03)
UROBILINOGEN UR STRIP-ACNC: 2

## 2024-09-13 PROCEDURE — 81001 URINALYSIS AUTO W/SCOPE: CPT

## 2024-09-13 NOTE — TELEPHONE ENCOUNTER
RN Communicated with Dr Guerrero - Per Dr Guerrero, due to patient's age, No Order for PSA Test as this time. PSA order should be addressed by Urologist (patient was given information for referral)

## 2024-09-13 NOTE — TELEPHONE ENCOUNTER
First Call Attempt.   Left detailed message with provider's message , Urine test ordered and he should complete that test, Urology referral placed and provided phone number-  okay per verbal release   And to call back if any questions  Advised to monitor symptoms.  RN advised if symptoms get severely worse over weekend, patient should seek care at Emergency Room or Immediate Care.

## 2024-09-13 NOTE — TELEPHONE ENCOUNTER
Verified name and .    Patient calling to follow up on request for getting PSA blood test. He is aware that Dr. Guerrero advised that this would be ordered by the urologist, but he is concerned due to family history of prostate cancer and wants to ask that Dr. Guerrero place order so he can have the test done prior to seeing urologist.    Future Appointments   Date Time Provider Department Center   2024  9:30 AM Kalli Bright APRN [facility]

## 2024-09-16 RX ORDER — ALBUTEROL SULFATE 90 UG/1
2 INHALANT RESPIRATORY (INHALATION) EVERY 4 HOURS PRN
Qty: 18 G | Status: CANCELLED | OUTPATIENT
Start: 2024-09-16

## 2024-09-17 ENCOUNTER — TELEPHONE (OUTPATIENT)
Dept: FAMILY MEDICINE CLINIC | Facility: CLINIC | Age: 44
End: 2024-09-17

## 2024-09-17 NOTE — TELEPHONE ENCOUNTER
Patient is very upset with stated information below is not true.  States pharmacy only provides him with 1 inhaler each time.    Stated was seen by Dr. Guerrero and was advised it is ok to take more often.    Also stated still waiting for Dr. Guerrero to review labs that and provide blood work for PSA level.  See TE 9/11/24 & Mychart 9/12/24.    Does not want message to go back to [facility] want it addressed with Dr. Guerrero.    Feel clinic is going down hill     Please advise    LOV 6/2024

## 2024-09-17 NOTE — TELEPHONE ENCOUNTER
Please review.  Protocol failed / Has no protocol.    Patient has received 10 inhalers since 5/31/2024, and is sending mychart for refill.     There is note on chart that patient needs to be seen for albuterol requests      Requested Prescriptions   Pending Prescriptions Disp Refills    albuterol 108 (90 Base) MCG/ACT Inhalation Aero Soln 18 g 1     Sig: Inhale 2 puffs into the lungs every 4 (four) hours as needed for Wheezing.       Asthma & COPD Medication Protocol Failed - 9/12/2024  8:54 AM        Failed - Asthma Action Score greater than or equal to 20        Failed - AAP/ACT given in last 12 months     No data recorded  No data recorded  No data recorded  No data recorded          Passed - Appointment in past 6 or next 3 months      Recent Outpatient Visits              1 month ago MADIE (obstructive sleep apnea)    [facility], Luis Armando Roy MD    Office Visit    2 months ago MADIE (obstructive sleep apnea)    [facility], Luis Armando Valdez MD    Office Visit    2 months ago Essential hypertension    [facility], [place] Wh[place]    Office Visit    6 months ago Essential hypertension    [facility], Scarlet Lopez APRN    Office Visit    7 months ago Chronic sinusitis, unspecified location    [facility], Luis Armando Roy MD    Office Visit          Future Appointments         Provider Department Appt Notes    In 2 weeks Darnell Sawyer MD [facility] urine frequency  informed to bring id/ins                       Future Appointments         Provider Department Appt Notes    In 2 weeks Darnell Sawyer MD [facility] urine frequency  informed to bring id/ins          Recent Outpatient Visits              1 month ago MADIE  (obstructive sleep apnea)    [facility], [facility], Luis Armando Roy MD    Office Visit    2 months ago MADIE (obstructive sleep apnea)    [facility], Luis Armando Valdez MD    Office Visit    2 months ago Essential hypertension    [facility], [address], Albert Fraire DO    Office Visit    6 months ago Essential hypertension    [facility], Scarlet Lopez APRN    Office Visit    7 months ago Chronic sinusitis, unspecified location    [facility], Luis Armando Roy MD    Office Visit

## 2024-09-19 RX ORDER — ALBUTEROL SULFATE 90 UG/1
2 INHALANT RESPIRATORY (INHALATION) EVERY 4 HOURS PRN
Qty: 18 G | Refills: 1 | Status: SHIPPED | OUTPATIENT
Start: 2024-09-19

## 2024-09-19 NOTE — TELEPHONE ENCOUNTER
Patient stated that he is requesting a refill on his albuterol inhaler and wanted doctor's recommendations on his urine results from 9/13/2024. Symptoms are better than the were before.  Also needed an order for a PSA. Please advise.

## 2024-09-22 NOTE — TELEPHONE ENCOUNTER
Dr Guerrero, patient is asking for PSA and sending multiple messages with this request.  See message and advise.

## 2024-09-23 NOTE — TELEPHONE ENCOUNTER
Once again - this has been conferred in other message - he should see the urologist.  I am depending on the urologist to decide on further testing .  Thank you.

## 2024-09-24 RX ORDER — ALBUTEROL SULFATE 90 UG/1
2 INHALANT RESPIRATORY (INHALATION) EVERY 4 HOURS PRN
Qty: 18 G | Refills: 1 | OUTPATIENT
Start: 2024-09-24

## 2024-09-25 RX ORDER — ALBUTEROL SULFATE 90 UG/1
2 INHALANT RESPIRATORY (INHALATION) EVERY 4 HOURS PRN
Qty: 25.5 G | Refills: 0 | OUTPATIENT
Start: 2024-09-25

## 2024-10-02 ENCOUNTER — OFFICE VISIT (OUTPATIENT)
Dept: SURGERY | Facility: CLINIC | Age: 44
End: 2024-10-02
Payer: COMMERCIAL

## 2024-10-02 VITALS
SYSTOLIC BLOOD PRESSURE: 150 MMHG | DIASTOLIC BLOOD PRESSURE: 83 MMHG | HEIGHT: 69 IN | BODY MASS INDEX: 37.03 KG/M2 | WEIGHT: 250 LBS | HEART RATE: 76 BPM

## 2024-10-02 DIAGNOSIS — R39.9 LOWER URINARY TRACT SYMPTOMS (LUTS): ICD-10-CM

## 2024-10-02 DIAGNOSIS — Z12.5 PROSTATE CANCER SCREENING: Primary | ICD-10-CM

## 2024-10-02 DIAGNOSIS — Z80.42 FAMILY HISTORY OF PROSTATE CANCER IN FATHER: ICD-10-CM

## 2024-10-02 PROCEDURE — 3077F SYST BP >= 140 MM HG: CPT | Performed by: UROLOGY

## 2024-10-02 PROCEDURE — 3079F DIAST BP 80-89 MM HG: CPT | Performed by: UROLOGY

## 2024-10-02 PROCEDURE — 99204 OFFICE O/P NEW MOD 45 MIN: CPT | Performed by: UROLOGY

## 2024-10-02 PROCEDURE — 3008F BODY MASS INDEX DOCD: CPT | Performed by: UROLOGY

## 2024-10-02 NOTE — PROGRESS NOTES
[facility]rology  Initial Office Consultation    HPI:   Kolby Villa is a 44 year old male here today for consultation at the request of, and a copy of this note will be sent to, Albert Guerrero DO.    1.  Prostate cancer screening  2.  Family history of prostate cancer  3.  LUTS  Patient reports urinary frequency, urgency, and dysuria around August 2024.  Also reports associated difficulty with erections at the time.  Seen by PCP.      Urinalysis 8/31/2024 with 2+ leuks, no nitrites, 11-20 WBCs, 0-2 RBCs, 1+ bacteria.  Urine culture was negative.    He was treated with a 1 week course of oral Cipro.  His symptoms eventually improved including erectile dysfunction.    Repeat urinalysis 9/13/2024 was essentially negative except for some proteinuria.    His IPSS today is 9 (0/4/0/0/0/0/5) with a quality-of-life score of 1.  He reports drinking a lot of water throughout the day and closer to his bedtime.    Patient reports family history of prostate cancer in his father who passed away of metastatic prostate cancer.  He has not had any screening PSA levels.    He denies gross hematuria.  No history of nephrolithiasis.  No flank pain.    Bladder scan reveals a PVR of 0 mL.      SOCIAL HISTORY: Engaged and has 2 children.  No smoking or illicit drug use.  No alcohol.  Works in a .     Past Medical History:    Aspirin sensitivity    ASA Sensitivity    Asthma (HCC)    Samples tried    Chronic sinusitis    Esophageal reflux    High blood pressure    Hyperlipidemia    Hypertension    Nasal polyps    Problems with swallowing    Sleep apnea    Visual impairment    GLASSES     Past Surgical History:   Procedure Laterality Date    Excision turbinate,submucous  02/14/2020    Nasal scope,bx/rmv polyp/debrid  02/14/2020    Nasal scopy,remv part ethmoid  02/14/2020    Nasal scopy,rmv tiss maxill sinus  02/14/2020     Family History   Problem Relation Age of Onset    Asthma Mother     Prostate  Cancer Father     Hypertension Father      Allergies: Peas, Strawberries, Atorvastatin, and Ibuprofen      REVIEW OF SYSTEMS:  Pertinent positives and negatives per HPI. A 12-point ROS was performed and is otherwise negative.       EXAM:  /83 (BP Location: Left arm, Patient Position: Sitting, Cuff Size: adult)   Pulse 76   Ht 5' 9\" (1.753 m)   Wt 250 lb (113.4 kg)   BMI 36.92 kg/m²     Physical Exam  Constitutional:       General: He is not in acute distress.     Appearance: He is well-developed.   HENT:      Head: Normocephalic.   Eyes:      General: No scleral icterus.  Cardiovascular:      Rate and Rhythm: Normal rate.   Pulmonary:      Effort: Pulmonary effort is normal.   Abdominal:      General: There is no distension.      Palpations: Abdomen is soft.      Tenderness: There is no abdominal tenderness.   Genitourinary:     Penis: Circumcised.       Testes: Normal.      Epididymis:      Right: Normal.      Left: Normal.      Comments: Patient politely refused SHELTON.  He reports it was recently done by his PCP.  Skin:     General: Skin is warm and dry.   Neurological:      Mental Status: He is alert and oriented to person, place, and time.   Psychiatric:         Mood and Affect: Mood normal.         Behavior: Behavior normal.       LABS:  See HPI for details.      IMAGING:  No results found.      IMPRESSION:  44 year old male with family history of prostate cancer in his father.    Discussed with patient.  We reviewed prostate cancer screening per AUA guidelines including benefits and risks of screening.    Patient agrees to PSA screening.    Otherwise reports no significant LUTS at this time.  Regarding his nocturia, patient admits to drinking a lot of water daily and does not restrict fluids in the evening.  Discussed behavioral changes and fluid management to help address his nocturia if bothered.    All questions answered.      PLAN:  1.  Screening PSA level.  Patient will be notified of the results.   If within normal range, he may continue routine PSA screening through PCP with once yearly or every other year PSA levels.    2.  Behavioral changes and fluid management (limit fluids 4 hours before bedtime) to address nocturia.    Follow-up as needed.    Darnell Sawyer MD  10/2/2024

## 2024-10-05 ENCOUNTER — LAB ENCOUNTER (OUTPATIENT)
Dept: LAB | Age: 44
End: 2024-10-05
Attending: UROLOGY
Payer: COMMERCIAL

## 2024-10-05 DIAGNOSIS — Z12.5 PROSTATE CANCER SCREENING: ICD-10-CM

## 2024-10-05 DIAGNOSIS — Z80.42 FAMILY HISTORY OF PROSTATE CANCER IN FATHER: ICD-10-CM

## 2024-10-05 LAB — COMPLEXED PSA SERPL-MCNC: 0.48 NG/ML (ref ?–4)

## 2024-10-05 PROCEDURE — 36415 COLL VENOUS BLD VENIPUNCTURE: CPT

## 2024-10-07 NOTE — TELEPHONE ENCOUNTER
Please review.  Protocol failed / Has no protocol.     Requested Prescriptions   Pending Prescriptions Disp Refills    AMLODIPINE 5 MG Oral Tab [Pharmacy Med Name: AMLODIPINE BESYLATE 5MG TABLETS] 90 tablet 1     Sig: TAKE 1 TABLET BY MOUTH DAILY       Hypertension Medications Protocol Failed - 10/4/2024  9:11 AM        Failed - Last BP reading less than 140/90     BP Readings from Last 1 Encounters:   10/02/24 150/83               Passed - CMP or BMP in past 12 months        Passed - In person appointment or virtual visit in the past 12 mos or appointment in next 3 mos     Recent Outpatient Visits              5 days ago Prostate cancer screening    [facility], Darnell Kaplan MD    Office Visit    2 months ago MADIE (obstructive sleep apnea)    [facility], Luis Armando Roy MD    Office Visit    3 months ago MADIE (obstructive sleep apnea)    [facility], Luis Armando Valdez MD    Office Visit    3 months ago Essential hypertension    [facility] [place]    Office Visit    7 months ago Essential hypertension    [facility], Scarlet Lopez APRN    Office Visit                      Passed - EGFRCR or GFRNAA > 50     GFR Evaluation  EGFRCR: 115 , resulted on 1/30/2024            TELMISARTAN 40 MG Oral Tab [Pharmacy Med Name: TELMISARTAN 40MG TABLETS] 90 tablet 1     Sig: TAKE 1 TABLET BY MOUTH DAILY       Hypertension Medications Protocol Failed - 10/4/2024  9:11 AM        Failed - Last BP reading less than 140/90     BP Readings from Last 1 Encounters:   10/02/24 150/83               Passed - CMP or BMP in past 12 months        Passed - In person appointment or virtual visit in the past 12 mos or appointment in next 3 mos     Recent Outpatient Visits              5 days ago Prostate cancer screening     [facility], Darnell Kaplan MD    Office Visit    2 months ago MADIE (obstructive sleep apnea)    [facility], [facility] Roy [facility]    Office Visit    3 months ago MADIE (obstructive sleep apnea)    [facility], Luis Armando Valdez MD    Office Visit    3 months ago Essential hypertension    [facility], [address], Albert Fraire DO    Office Visit    7 months ago Essential hypertension    [facility], Scarlet Lopez APRN    Office Visit                      Passed - EGFRCR or GFRNAA > 50     GFR Evaluation  EGFRCR: 115 , resulted on 1/30/2024               Recent Outpatient Visits              5 days ago Prostate cancer screening    [facility], Darnell Kaplan MD    Office Visit    2 months ago MADIE (obstructive sleep apnea)    [facility], Luis Armando Roy MD    Office Visit    3 months ago MADIE (obstructive sleep apnea)    [facility], Luis Armando Valdez MD    Office Visit    3 months ago Essential hypertension    [facility], [address], Albert Fraire DO    Office Visit    7 months ago Essential hypertension    [facility], Scarlet Lopez APRN    Office Visit

## 2024-10-08 RX ORDER — AMLODIPINE BESYLATE 5 MG/1
5 TABLET ORAL DAILY
Qty: 90 TABLET | Refills: 1 | Status: SHIPPED | OUTPATIENT
Start: 2024-10-08

## 2024-10-08 RX ORDER — TELMISARTAN 40 MG/1
40 TABLET ORAL DAILY
Qty: 90 TABLET | Refills: 1 | Status: SHIPPED | OUTPATIENT
Start: 2024-10-08

## 2024-10-22 DIAGNOSIS — M54.50 ACUTE RIGHT-SIDED LOW BACK PAIN, UNSPECIFIED WHETHER SCIATICA PRESENT: ICD-10-CM

## 2024-10-24 RX ORDER — ALBUTEROL SULFATE 90 UG/1
2 INHALANT RESPIRATORY (INHALATION) EVERY 4 HOURS PRN
Qty: 18 G | Refills: 1 | Status: SHIPPED | OUTPATIENT
Start: 2024-10-24

## 2024-10-24 RX ORDER — HYDROCODONE BITARTRATE AND ACETAMINOPHEN 10; 325 MG/1; MG/1
1 TABLET ORAL EVERY 8 HOURS PRN
Qty: 45 TABLET | Refills: 0 | Status: SHIPPED | OUTPATIENT
Start: 2024-10-24

## 2024-10-24 NOTE — TELEPHONE ENCOUNTER
Please review.  Protocol failed / Has no protocol.    Albuterol inhaler recent fills 7/12 #3, 8/9, 8/18, 9/11, 9/19, 9/19, 10/8.     Patient has received 9 inhalers in 3 months    Norco 10mg Recent fills each # 45 : 6/14, 7/26, 8/30  Last prescription written: 8/30/24  Last office visit: 6/24/24       Requested Prescriptions   Pending Prescriptions Disp Refills    HYDROcodone-acetaminophen (NORCO)  MG Oral Tab 45 tablet 0     Sig: Take 1 tablet by mouth every 8 (eight) hours as needed.       Controlled Substance Medication Failed - 10/24/2024  1:35 PM        Failed - This medication is a controlled substance - forward to provider to refill          albuterol 108 (90 Base) MCG/ACT Inhalation Aero Soln 18 g      Sig: Inhale 2 puffs into the lungs every 4 (four) hours as needed for Wheezing.       Asthma & COPD Medication Protocol Failed - 10/24/2024  1:35 PM        Failed - Asthma Action Score greater than or equal to 20        Failed - AAP/ACT given in last 12 months     No data recorded  No data recorded  No data recorded  No data recorded          Passed - Appointment in past 6 or next 3 months      Recent Outpatient Visits              3 weeks ago Prostate cancer screening    [facility] [place]    Office Visit    2 months ago MADIE (obstructive sleep apnea)    [facility], Luis Armando Roy MD    Office Visit    3 months ago MADIE (obstructive sleep apnea)    [facility], Luis Armando Valdez MD    Office Visit    4 months ago Essential hypertension    [facility], Albert Fraire DO    Office Visit    7 months ago Essential hypertension    [facility], Scarlet Lopez APRN    Office Visit                           Recent Outpatient Visits              3 weeks ago Prostate cancer screening    [place]  [facility], Darnell Kaplan MD    Office Visit    2 months ago MADIE (obstructive sleep apnea)    [facility], Luis Armando Roy MD    Office Visit    3 months ago MADIE (obstructive sleep apnea)    [facility], Luis Armando Valdez MD    Office Visit    4 months ago Essential hypertension    [facility], [place]    Office Visit    7 months ago Essential hypertension    [facility], Scarlet Lopez, [place]    Office Visit

## 2024-11-08 ENCOUNTER — HOSPITAL ENCOUNTER (OUTPATIENT)
Dept: GENERAL RADIOLOGY | Age: 44
Discharge: HOME | End: 2024-11-08
Attending: FAMILY MEDICINE
Payer: COMMERCIAL

## 2024-11-08 ENCOUNTER — OFFICE VISIT (OUTPATIENT)
Dept: FAMILY MEDICINE CLINIC | Facility: CLINIC | Age: 44
End: 2024-11-08

## 2024-11-08 VITALS
BODY MASS INDEX: 38.95 KG/M2 | DIASTOLIC BLOOD PRESSURE: 81 MMHG | HEART RATE: 77 BPM | HEIGHT: 69 IN | SYSTOLIC BLOOD PRESSURE: 137 MMHG | WEIGHT: 263 LBS

## 2024-11-08 DIAGNOSIS — R05.1 ACUTE COUGH: Primary | ICD-10-CM

## 2024-11-08 DIAGNOSIS — H10.9 BACTERIAL CONJUNCTIVITIS OF RIGHT EYE: ICD-10-CM

## 2024-11-08 DIAGNOSIS — J45.40 MODERATE PERSISTENT ASTHMA WITHOUT COMPLICATION (HCC): ICD-10-CM

## 2024-11-08 DIAGNOSIS — R05.1 ACUTE COUGH: ICD-10-CM

## 2024-11-08 PROCEDURE — 3075F SYST BP GE 130 - 139MM HG: CPT | Performed by: FAMILY MEDICINE

## 2024-11-08 PROCEDURE — 3008F BODY MASS INDEX DOCD: CPT | Performed by: FAMILY MEDICINE

## 2024-11-08 PROCEDURE — 71046 X-RAY EXAM CHEST 2 VIEWS: CPT | Performed by: FAMILY MEDICINE

## 2024-11-08 PROCEDURE — 3079F DIAST BP 80-89 MM HG: CPT | Performed by: FAMILY MEDICINE

## 2024-11-08 PROCEDURE — 99214 OFFICE O/P EST MOD 30 MIN: CPT | Performed by: FAMILY MEDICINE

## 2024-11-08 RX ORDER — POLYMYXIN B SULFATE AND TRIMETHOPRIM 1; 10000 MG/ML; [USP'U]/ML
1 SOLUTION OPHTHALMIC EVERY 6 HOURS
Qty: 1 EACH | Refills: 0 | Status: SHIPPED | OUTPATIENT
Start: 2024-11-08

## 2024-11-08 RX ORDER — PREDNISONE 20 MG/1
TABLET ORAL
Qty: 12 TABLET | Refills: 0 | Status: SHIPPED | OUTPATIENT
Start: 2024-11-08

## 2024-11-08 NOTE — PROGRESS NOTES
Blood pressure 137/81, pulse 77, height 5' 9\" (1.753 m), weight 263 lb (119.3 kg).      1 week history of cough nocturnal with yellow phlegm production also yellow nasal congestion.  Denies sore throat or ear pain.  Some chest pain with cough family member with pneumonia.  Patient denies fever or chills some shortness of breath with coughing no shortness of breath with exertion.  Using his inhaler often.  Denies headache, facial pain or pressure no bad breath.  Right eye with mucus production and itching.  Denies any tooth pain.  No smoking history.  Patient has asthma.  No recent hospitalizations for asthma and he has never been intubated.  Denies any sneezing.    Objective PERRLA EOMI    Lungs clear to auscultation no rales rhonchi or wheezes    Throat clear not erythematous    Assessment nocturnal cough with asthma productive and conjunctivitis bacterial    Plan stat chest x-ray with prednisone prescription    Continue albuterol as needed    Polymyxin trimethoprim for conjunctivitis    Follow-up if no improvement    Go to emergency department immediately if breathing worsens.

## 2024-11-11 ENCOUNTER — PATIENT MESSAGE (OUTPATIENT)
Dept: FAMILY MEDICINE CLINIC | Facility: CLINIC | Age: 44
End: 2024-11-11

## 2024-11-27 RX ORDER — LEVOCETIRIZINE DIHYDROCHLORIDE 5 MG/1
5 TABLET, FILM COATED ORAL EVERY EVENING
Qty: 90 TABLET | Refills: 3 | OUTPATIENT
Start: 2024-11-27

## 2024-11-27 NOTE — TELEPHONE ENCOUNTER
Duplicate request, previously addressed.      Disp Refills Start End    levocetirizine 5 MG Oral Tab 90 tablet 3 2/26/2024 --    Sig - Route: Take 1 tablet (5 mg total) by mouth every evening. - Oral    Sent to pharmacy as: Levocetirizine Dihydrochloride 5 MG Oral Tablet (Xyzal)    E-Prescribing Status: Receipt confirmed by pharmacy (2/26/2024  1:33 PM CST)      Pharmacy    [facility][id number] - [place], [place] - [address] AT [facility] [address], [phone], [phone]

## 2024-11-28 NOTE — TELEPHONE ENCOUNTER
Please review. Protocol Failed or has No Protocol.    Requested Prescriptions   Pending Prescriptions Disp Refills    pravastatin 10 MG Oral Tab 90 tablet 3     Sig: Take 1 tablet (10 mg total) by mouth nightly.       Cholesterol Medication Protocol Failed - 11/27/2024  7:07 PM        Failed - Lipid panel within past 12 months     Lab Results   Component Value Date    CHOLEST 176 03/25/2023    TRIG 276 (H) 03/25/2023    HDL 36 (L) 03/25/2023    LDL 94 03/25/2023    VLDL 46 (H) 03/25/2023    NONHDLC 140 (H) 03/25/2023             Passed - ALT < 80     Lab Results   Component Value Date    ALT 34 01/30/2024             Passed - ALT resulted within past year        Passed - In person appointment or virtual visit in the past 12 mos or appointment in next 3 mos     Recent Outpatient Visits              2 weeks ago Acute cough    [facility], [place], David B, DO    Office Visit    1 month ago Prostate cancer screening    [facility], Darnell Kaplan MD    Office Visit    3 months ago MADIE (obstructive sleep apnea)    [facility], Luis Armando Roy MD    Office Visit    4 months ago MADIE (obstructive sleep apnea)    [facility], Luis Armando Valdez MD    Office Visit    5 months ago Essential hypertension    [facility], Albert Fraire DO    Office Visit                           Recent Outpatient Visits              2 weeks ago Acute cough    [facility], [place], David B, DO    Office Visit    1 month ago Prostate cancer screening    [facility], Darnell Kaplan MD    Office Visit    3 months ago MADIE (obstructive sleep apnea)    [facility], Luis Armando Roy MD    Office Visit    4 months ago MADIE (obstructive  sleep apnea)    [facility], Luis Armando Valdez MD    Office Visit    5 months ago Essential hypertension    [facility], [address], Albert Fraire DO    Office Visit

## 2024-11-30 RX ORDER — PRAVASTATIN SODIUM 10 MG
10 TABLET ORAL NIGHTLY
Qty: 90 TABLET | Refills: 3 | OUTPATIENT
Start: 2024-11-30

## 2024-12-12 DIAGNOSIS — M54.50 ACUTE RIGHT-SIDED LOW BACK PAIN, UNSPECIFIED WHETHER SCIATICA PRESENT: ICD-10-CM

## 2024-12-13 NOTE — TELEPHONE ENCOUNTER
Received call from Ashley with Pharmacy. Patient's date of birth and full name both confirmed.   They are inquiring about refill request.   RN advised Request was received 12/12/24; and that is is being processed. Please allow 3 business days for refills to be addressed.   She verbalizes understanding of all information, and agreeable to plan.

## 2024-12-16 DIAGNOSIS — M54.50 ACUTE RIGHT-SIDED LOW BACK PAIN, UNSPECIFIED WHETHER SCIATICA PRESENT: ICD-10-CM

## 2024-12-16 RX ORDER — ALBUTEROL SULFATE 90 UG/1
2 INHALANT RESPIRATORY (INHALATION) EVERY 4 HOURS PRN
Qty: 8.5 G | Refills: 0 | OUTPATIENT
Start: 2024-12-16

## 2024-12-16 NOTE — TELEPHONE ENCOUNTER
Please review. Protocol Failed; No Protocol    Requested Prescriptions   Pending Prescriptions Disp Refills    albuterol 108 (90 Base) MCG/ACT Inhalation Aero Soln 18 g 0     Sig: Inhale 2 puffs into the lungs every 4 (four) hours as needed for Wheezing.       Asthma & COPD Medication Protocol Failed - 12/16/2024 11:33 AM        Failed - ACT Score greater than or equal to 20                Failed - ACT recorded in the last 12 months                Passed - Appointment in past 6 or next 3 months      Recent Outpatient Visits              1 month ago Acute cough    [facility], [place], David B,     Office Visit    2 months ago Prostate cancer screening    [facility], Darnell Kaplan MD    Office Visit    4 months ago MADIE (obstructive sleep apnea)    [facility], Luis Armando Roy MD    Office Visit    5 months ago MADIE (obstructive sleep apnea)    [facility], Luis Armando Valdez MD    Office Visit    5 months ago Essential hypertension    [facility], Albert Fraire DO    Office Visit                               Recent Outpatient Visits              1 month ago Acute cough    [facility], [place], David B,     Office Visit    2 months ago Prostate cancer screening    [facility] [place], Darnell Kaplan MD    Office Visit    4 months ago MADIE (obstructive sleep apnea)    [facility], Luis Armando Roy MD    Office Visit    5 months ago MADIE (obstructive sleep apnea)    [facility] [place], Luis Armando Valdez MD    Office Visit    5 months ago Essential hypertension    [facility], Albert Fraire DO    Office Visit

## 2024-12-16 NOTE — TELEPHONE ENCOUNTER
Please review. Protocol Failed; No Protocol      Recent fills: 8/30/2024, 10/24/72792  Last Rx written: 10/24/2024  Last office visit: 6/24/2024      Requested Prescriptions   Pending Prescriptions Disp Refills    HYDROcodone-acetaminophen (NORCO)  MG Oral Tab 45 tablet 0     Sig: Take 1 tablet by mouth every 8 (eight) hours as needed.       Controlled Substance Medication Failed - 12/16/2024 11:34 AM        Failed - This medication is a controlled substance - forward to provider to refill                 Recent Outpatient Visits              1 month ago Acute cough    [facility], [place], David B, DO    Office Visit    2 months ago Prostate cancer screening    [facility], Darnell Kaplan MD    Office Visit    4 months ago MADIE (obstructive sleep apnea)    [facility], Luis Armando Roy MD    Office Visit    5 months ago MADIE (obstructive sleep apnea)    [facility], Luis Armando Valdez MD    Office Visit    5 months ago Essential hypertension    [facility], Albert Fraire DO    Office Visit

## 2024-12-16 NOTE — TELEPHONE ENCOUNTER
[facility] pharmacy calling for status or albuterol inhaler refill  Duplicate encounter. Also see 12/12/24 refill requests that has already been routed to refill team as a high priority.  Closing this duplicate encounter.

## 2024-12-16 NOTE — TELEPHONE ENCOUNTER
Received call from [employer].  Patient still needs albuterol refill.  Routed to Refill Pool as urgent request.

## 2024-12-17 RX ORDER — HYDROCODONE BITARTRATE AND ACETAMINOPHEN 10; 325 MG/1; MG/1
1 TABLET ORAL EVERY 8 HOURS PRN
Qty: 45 TABLET | Refills: 0 | Status: SHIPPED | OUTPATIENT
Start: 2024-12-17

## 2024-12-17 RX ORDER — ALBUTEROL SULFATE 90 UG/1
2 INHALANT RESPIRATORY (INHALATION) EVERY 4 HOURS PRN
Qty: 18 G | Refills: 0 | Status: SHIPPED | OUTPATIENT
Start: 2024-12-17 | End: 2025-01-03

## 2024-12-20 NOTE — TELEPHONE ENCOUNTER
Call from Ashley, pharmacy tech from [facility], seeking refill of pravastatin.  Pended and routed to Refill Pool to run protocol.

## 2024-12-21 RX ORDER — HYDROCODONE BITARTRATE AND ACETAMINOPHEN 10; 325 MG/1; MG/1
1 TABLET ORAL EVERY 8 HOURS PRN
Qty: 45 TABLET | Refills: 0 | OUTPATIENT
Start: 2024-12-21

## 2024-12-21 RX ORDER — ALBUTEROL SULFATE 90 UG/1
2 INHALANT RESPIRATORY (INHALATION) EVERY 4 HOURS PRN
Qty: 18 G | Refills: 0 | OUTPATIENT
Start: 2024-12-21

## 2024-12-21 NOTE — TELEPHONE ENCOUNTER
Disp Refills Start End    albuterol 108 (90 Base) MCG/ACT Inhalation Aero Soln 18 g 0 12/17/2024 --    Sig - Route: Inhale 2 puffs into the lungs every 4 (four) hours as needed for Wheezing. - Inhalation    Sent to pharmacy as: Albuterol Sulfate  (90 Base) MCG/ACT Inhalation Aerosol Solution (Ventolin HFA)    E-Prescribing Status: Receipt confirmed by pharmacy (12/17/2024 12:43 PM CST)      Pharmacy    [facility] #[id number] - [place], [place] - [facility] [address] AT [facility], [phone], [phone]

## 2024-12-21 NOTE — TELEPHONE ENCOUNTER
Disp Refills Start End     HYDROcodone-acetaminophen (NORCO)  MG Oral Tab 45 tablet 0 12/17/2024 --    Sig - Route: Take 1 tablet by mouth every 8 (eight) hours as needed. Will need office visit for further refills. - Oral    Sent to pharmacy as: HYDROcodone-Acetaminophen  MG Oral Tablet    Earliest Fill Date: 12/17/2024    E-Prescribing Status: Receipt confirmed by pharmacy (12/17/2024  5:57 PM CST)      Associated Diagnoses    Acute right-sided low back pain, unspecified whether sciatica present        Pharmacy    [facility] #[id number] - [place], [place] - [address] AT [facility] [address], [phone], [phone]

## 2024-12-23 ENCOUNTER — NURSE TRIAGE (OUTPATIENT)
Dept: FAMILY MEDICINE CLINIC | Facility: CLINIC | Age: 44
End: 2024-12-23

## 2024-12-23 ENCOUNTER — TELEMEDICINE (OUTPATIENT)
Dept: INTERNAL MEDICINE CLINIC | Facility: CLINIC | Age: 44
End: 2024-12-23

## 2024-12-23 DIAGNOSIS — B96.89 ACUTE BACTERIAL SINUSITIS: Primary | ICD-10-CM

## 2024-12-23 DIAGNOSIS — J01.90 ACUTE BACTERIAL SINUSITIS: Primary | ICD-10-CM

## 2024-12-23 DIAGNOSIS — J06.9 UPPER RESPIRATORY TRACT INFECTION, UNSPECIFIED TYPE: ICD-10-CM

## 2024-12-23 RX ORDER — PRAVASTATIN SODIUM 10 MG
10 TABLET ORAL NIGHTLY
Qty: 90 TABLET | Refills: 3 | OUTPATIENT
Start: 2024-12-23

## 2024-12-23 RX ORDER — FLUTICASONE PROPIONATE 50 MCG
2 SPRAY, SUSPENSION (ML) NASAL DAILY
Qty: 1 EACH | Refills: 11 | Status: SHIPPED | OUTPATIENT
Start: 2024-12-23

## 2024-12-23 RX ORDER — AZELASTINE HYDROCHLORIDE 137 UG/1
1-2 SPRAY, METERED NASAL 2 TIMES DAILY
Qty: 30 ML | Refills: 11 | Status: SHIPPED | OUTPATIENT
Start: 2024-12-23

## 2024-12-23 RX ORDER — LEVOCETIRIZINE DIHYDROCHLORIDE 5 MG/1
5 TABLET, FILM COATED ORAL EVERY EVENING
Qty: 90 TABLET | Refills: 3 | Status: SHIPPED | OUTPATIENT
Start: 2024-12-23 | End: 2025-03-17

## 2024-12-23 RX ORDER — PREDNISONE 10 MG/1
TABLET ORAL
Qty: 30 TABLET | Refills: 0 | Status: SHIPPED | OUTPATIENT
Start: 2024-12-23 | End: 2025-01-04

## 2024-12-23 NOTE — TELEPHONE ENCOUNTER
Verified name and .    Patient states he has no issues with Pravastatin- tolerating medication well.

## 2024-12-23 NOTE — TELEPHONE ENCOUNTER
Please call patient and verify if he was able to tolerate pravastatin due to intolerance of atorvastatin

## 2024-12-23 NOTE — PROGRESS NOTES
Virtual Telephone Check-In    Kolby Villa verbally consents to a Virtual Telephone Check-In service on 12/23/24. Patient understands and accepts financial responsibility for any deductible, co-insurance and/or co-pays associated with this service.     I spoke with Kolby Romeroraymond by telephone, verified date of birth, and discussed their current concerns:     Pt is a 43y/o male with Pmhx of pituitary apoplexa, candida esophagitis, HLD, HTN, Asthma, Obesity, Vitamin DD def, GERD, Eosinophilic esophagitis, whom calls via televideo as new pt to internal medicine clinic today for fever, body aches, nausea, diarrhea, headache and cough, 103F fever, chills headache, and diarrhea, nausea.   Pt is taking tylenol, and chest congestion. Day 2  Last week daughter 8 years old had RSV,     Recently had pravastatin and levocetrizine     Review of systems.  Review of Systems   Constitutional:  Positive for fatigue and fever.   HENT:  Positive for postnasal drip, rhinorrhea, sinus pressure, sinus pain, sore throat and voice change. Negative for tinnitus and trouble swallowing.    Respiratory:  Positive for cough and wheezing. Negative for chest tightness and shortness of breath.    Cardiovascular: Negative.    Gastrointestinal: Negative.    Skin: Negative.    Neurological: Negative.         Reviewed Active Problems:  Patient Active Problem List    Diagnosis    Hematoma    Calculus of gallbladder without cholecystitis without obstruction    Vitamin D deficiency    Other hyperlipidemia    Obesity (BMI 35.0-39.9 without comorbidity)    Essential hypertension    Candida esophagitis (HCC)    Eosinophilic esophagitis    Esophagitis    Gastritis    Spleen anomaly    Gastroesophageal reflux disease    Floppy eyelid syndrome    Moderate persistent asthma without complication (HCC)    Pituitary abnormality (HCC)    Pituitary apoplexy (HCC)    Allergic rhinitis      Reviewed Past Medical History:  Past Medical History:    Aspirin  sensitivity    ASA Sensitivity    Asthma (HCC)    Samples tried    Chronic sinusitis    Esophageal reflux    High blood pressure    Hyperlipidemia    Hypertension    Nasal polyps    Problems with swallowing    Sleep apnea    Visual impairment    GLASSES      Reviewed Allergies  Allergies[1]   Reviewed Social History:  Social History     Socioeconomic History    Marital status: Single    Number of children: 1   Occupational History    Occupation: /[employer]   Tobacco Use    Smoking status: Never    Smokeless tobacco: Never    Tobacco comments:     Girlfriend smokes outside   Vaping Use    Vaping status: Never Used   Substance and Sexual Activity    Alcohol use: Never     Alcohol/week: 0.0 standard drinks of alcohol    Drug use: No   Other Topics Concern    Caffeine Concern Yes     Comment: 2 cups tea daily      Reviewed Current Medications:  Current Outpatient Medications   Medication Sig Dispense Refill    amoxicillin clavulanate 875-125 MG Oral Tab Take 1 tablet by mouth 2 (two) times daily for 10 days. 20 tablet 0    azelastine 137 MCG/SPRAY Nasal Solution 1-2 sprays by Nasal route in the morning and 1-2 sprays before bedtime. FOR SINUS SYMPTOMS/NASAL CONGESTION.. 30 mL 11    fluticasone propionate 50 MCG/ACT Nasal Suspension 2 sprays by Each Nare route daily. FOR NASAL CONGESTION/SINUS SYMPTOMS. 1 each 11    predniSONE 10 MG Oral Tab Take 4 tablets (40 mg total) by mouth daily for 3 days, THEN 3 tablets (30 mg total) daily for 3 days, THEN 2 tablets (20 mg total) daily for 3 days, THEN 1 tablet (10 mg total) daily for 3 days. TAKE WITH FOOD.. 30 tablet 0    levocetirizine 5 MG Oral Tab Take 1 tablet (5 mg total) by mouth every evening. 90 tablet 3    HYDROcodone-acetaminophen (NORCO)  MG Oral Tab Take 1 tablet by mouth every 8 (eight) hours as needed. Will need office visit for further refills. 45 tablet 0    albuterol 108 (90 Base) MCG/ACT Inhalation Aero Soln Inhale 2 puffs into the lungs  every 4 (four) hours as needed for Wheezing. 18 g 0    predniSONE 20 MG Oral Tab TAKE 1 TAB 3 TIMES DAILY 12 tablet 0    polymyxin B-trimethoprim 03507-1.1 UNIT/ML-% Ophthalmic Solution Place 1 drop into both eyes every 6 (six) hours. 1 each 0    amLODIPine 5 MG Oral Tab Take 1 tablet (5 mg total) by mouth daily. 90 tablet 1    telmisartan 40 MG Oral Tab Take 1 tablet (40 mg total) by mouth daily. 90 tablet 1    Ursodiol 500 MG Oral Tab Take 1 tablet (500 mg total) by mouth daily. 90 tablet 1    pravastatin 10 MG Oral Tab Take 1 tablet (10 mg total) by mouth nightly. 90 tablet 3          Physical Exam  There were no vitals filed for this visit.  Physical Exam  Constitutional:       Appearance: Normal appearance.   Neurological:      Mental Status: He is alert.            Diagnosis:  1. Acute bacterial sinusitis  - amoxicillin clavulanate 875-125 MG Oral Tab; Take 1 tablet by mouth 2 (two) times daily for 10 days.  Dispense: 20 tablet; Refill: 0  - azelastine 137 MCG/SPRAY Nasal Solution; 1-2 sprays by Nasal route in the morning and 1-2 sprays before bedtime. FOR SINUS SYMPTOMS/NASAL CONGESTION..  Dispense: 30 mL; Refill: 11  - fluticasone propionate 50 MCG/ACT Nasal Suspension; 2 sprays by Each Nare route daily. FOR NASAL CONGESTION/SINUS SYMPTOMS.  Dispense: 1 each; Refill: 11  - predniSONE 10 MG Oral Tab; Take 4 tablets (40 mg total) by mouth daily for 3 days, THEN 3 tablets (30 mg total) daily for 3 days, THEN 2 tablets (20 mg total) daily for 3 days, THEN 1 tablet (10 mg total) daily for 3 days. TAKE WITH FOOD..  Dispense: 30 tablet; Refill: 0  Patient presenting URI and now sinus congestion tenderness and erythematous nasal turbinates consistent with acute secondary acute bacterial rhinosinusitis.  Plan:  -take hot showers, drink tea and increase fluid intake   -Start Augmentin 1 tablet twice daily for 10 days  - pt educated while taking antibiotics should also take OTC priobiotics daily and/or natural  probiotics such as greek yogurt/Kefir to help prevent development of C.Diff.  -start prednisone taper given severity   -If no improvement, referral given to ENT for further evaluation and evaluation of possible nasal polyp or if CT sinus warranted  -If no improvement also able to follow up with myself follow up in 10-14 days if needs antibiotic duration and agent if warranted     2. Upper respiratory tract infection, unspecified type  - SARS-CoV-2/Flu A and B/RSV by PCR ([facility]); Future  Check for covid, flu RSV, if positive treat     Follow up: No follow-ups on file.  Duration of service, in minutes: 21 minutes  Please note that the following visit was completed using telephone communications.  This has been done in good raul to provide continuity of care in the best interest of the provider-patient relationship, due to the ongoing public health crisis/national emergency and because of restrictions of visitation.  There are limitations of this visit as no physical exam could be performed.  Every conscious effort was taken to allow for sufficient and adequate time.  This billing was spent on reviewing labs, medications, radiology tests and decision making.  Appropriate medical decision-making and tests are ordered as detailed in the plan of care above.  Patient also advised to follow CDC guidelines for self isolation/social distancing and symptomatic treatment as outlined on CDC Patient Guidelines.  Javi Layton MD        [1]   Allergies  Allergen Reactions    Peas SWELLING and OTHER (SEE COMMENTS)     Facial swelling    Strawberries RASH and SWELLING     Facial swelling    Atorvastatin OTHER (SEE COMMENTS)     Other reaction(s): ruq abd pain    Ibuprofen OTHER (SEE COMMENTS)     Other reaction(s): Trouble Breathing

## 2024-12-23 NOTE — TELEPHONE ENCOUNTER
[place], pharmacy [facility] called, verified patient's Name and . Following up on patient's refill of pravastatin.

## 2024-12-23 NOTE — TELEPHONE ENCOUNTER
Action Requested: Summary for Provider     []  Critical Lab, Recommendations Needed  [] Need Additional Advice  []   FYI    []   Need Orders  [] Need Medications Sent to Pharmacy  [x]  Other     SUMMARY: Verified name and .    Patient reports fevers, body aches, nausea, diarrhea, headache, cough.    He denies any difficulty breathing at this time.    Virtual visit scheduled:  Future Appointments   Date Time Provider Department Center   2024  4:30 PM Javi Layton MD [facility]       Reason for call: Acute  Onset: Data Unavailable      Reason for Disposition   Patient wants to be seen    Protocols used: Cough-A-[place]

## 2024-12-23 NOTE — TELEPHONE ENCOUNTER
Please review; protocol failed/ has no protocol    Requested Prescriptions   Pending Prescriptions Disp Refills    pravastatin 10 MG Oral Tab 90 tablet 3     Sig: Take 1 tablet (10 mg total) by mouth nightly.       Cholesterol Medication Protocol Failed - 12/23/2024 10:30 AM        Failed - Lipid panel within past 12 months     Lab Results   Component Value Date    CHOLEST 176 03/25/2023    TRIG 276 (H) 03/25/2023    HDL 36 (L) 03/25/2023    LDL 94 03/25/2023    VLDL 46 (H) 03/25/2023    NONHDLC 140 (H) 03/25/2023             Passed - ALT < 80     Lab Results   Component Value Date    ALT 34 01/30/2024             Passed - ALT resulted within past year        Passed - In person appointment or virtual visit in the past 12 mos or appointment in next 3 mos     Recent Outpatient Visits              1 month ago Acute cough    [facility], [place], David B, DO    Office Visit    2 months ago Prostate cancer screening    [facility], Darnell Kaplan MD    Office Visit    4 months ago MADIE (obstructive sleep apnea)    [facility], Luis Armando Roy MD    Office Visit    5 months ago MADIE (obstructive sleep apnea)    [facility], Luis Armando Valdez MD    Office Visit    6 months ago Essential hypertension    [facility], Albert Fraire DO    Office Visit                         Recent Outpatient Visits              1 month ago Acute cough    [facility], [place], David B, DO    Office Visit    2 months ago Prostate cancer screening    [facility], Darnell Kaplan MD    Office Visit    4 months ago MADIE (obstructive sleep apnea)    [facility], Luis Armando Roy MD    Office Visit    5 months ago MADIE (obstructive sleep  apnea)    [facility], Luis Armando Valdez MD    Office Visit    6 months ago Essential hypertension    [facility], [address], Albert Fraire DO    Office Visit

## 2024-12-24 ENCOUNTER — LAB ENCOUNTER (OUTPATIENT)
Dept: LAB | Age: 44
End: 2024-12-24
Attending: STUDENT IN AN ORGANIZED HEALTH CARE EDUCATION/TRAINING PROGRAM
Payer: COMMERCIAL

## 2024-12-24 DIAGNOSIS — J06.9 UPPER RESPIRATORY TRACT INFECTION, UNSPECIFIED TYPE: ICD-10-CM

## 2024-12-24 PROCEDURE — 87637 SARSCOV2&INF A&B&RSV AMP PRB: CPT

## 2024-12-24 RX ORDER — PRAVASTATIN SODIUM 10 MG
10 TABLET ORAL NIGHTLY
Qty: 90 TABLET | Refills: 3 | Status: SHIPPED | OUTPATIENT
Start: 2024-12-24

## 2024-12-25 ENCOUNTER — PATIENT MESSAGE (OUTPATIENT)
Dept: INTERNAL MEDICINE CLINIC | Facility: CLINIC | Age: 44
End: 2024-12-25

## 2024-12-25 LAB
FLUAV + FLUBV RNA SPEC NAA+PROBE: DETECTED
FLUAV + FLUBV RNA SPEC NAA+PROBE: NOT DETECTED
RSV RNA SPEC NAA+PROBE: NOT DETECTED
SARS-COV-2 RNA RESP QL NAA+PROBE: NOT DETECTED

## 2024-12-25 RX ORDER — OSELTAMIVIR PHOSPHATE 75 MG/1
75 CAPSULE ORAL 2 TIMES DAILY
Qty: 10 CAPSULE | Refills: 0 | Status: SHIPPED | OUTPATIENT
Start: 2024-12-25 | End: 2024-12-30

## 2024-12-26 NOTE — TELEPHONE ENCOUNTER
Dr. Layton - pended letter, if appropriate. Patient wants to pick the letter up from the office.     Please respond directly to the patient if no additional staff support is required.

## 2024-12-30 RX ORDER — URSODIOL 500 MG/1
500 TABLET, FILM COATED ORAL DAILY
Qty: 90 TABLET | Refills: 1 | OUTPATIENT
Start: 2024-12-30

## 2024-12-30 NOTE — TELEPHONE ENCOUNTER
Dr Guerrero, please advise    See Adayana messages regarding request for Albuterol-12/28/24  Patient is requesting refills on file.

## 2024-12-31 NOTE — TELEPHONE ENCOUNTER
Please review; protocol failed/ has no protocol    Requested Prescriptions   Pending Prescriptions Disp Refills    ALBUTEROL 108 (90 Base) MCG/ACT Inhalation Aero Soln [Pharmacy Med Name: ALBUTEROL HFA INH (200 PUFFS) 8.5GM] 8.5 g 0     Sig: INHALE 2 PUFFS INTO THE LUNGS EVERY 4 HOURS AS NEEDED FOR WHEEZING       Asthma & COPD Medication Protocol Failed - 12/31/2024  3:36 PM        Failed - ACT Score greater than or equal to 20                Failed - ACT recorded in the last 12 months                Passed - Appointment in past 6 or next 3 months      Recent Outpatient Visits              1 week ago Acute bacterial sinusitis    [facility], Javi Sheehan MD    Telemedicine    1 month ago Acute cough    [facility], [place], David B, DO    Office Visit    3 months ago Prostate cancer screening    [facility], Darnell Kaplan MD    Office Visit    5 months ago MADIE (obstructive sleep apnea)    [facility], Luis Armando Roy MD    Office Visit    6 months ago MADIE (obstructive sleep apnea)    [facility], Luis Armando Valdez MD    Office Visit                         Recent Outpatient Visits              1 week ago Acute bacterial sinusitis    [facility], Javi Sheehan MD    Telemedicine    1 month ago Acute cough    [facility], [place], David B, DO    Office Visit    3 months ago Prostate cancer screening    [facility], Darnell Kaplan MD    Office Visit    5 months ago MADIE (obstructive sleep apnea)    [facility], Luis Armando Roy MD    Office Visit    6 months ago MADIE (obstructive sleep apnea)    [facility], Luis Armando Valdez MD     Office Visit

## 2025-01-02 RX ORDER — ALBUTEROL SULFATE 90 UG/1
2 INHALANT RESPIRATORY (INHALATION) EVERY 4 HOURS PRN
Qty: 18 G | Refills: 2 | OUTPATIENT
Start: 2025-01-02

## 2025-01-02 NOTE — TELEPHONE ENCOUNTER
**Please advise that patient is overusing their albuterol inhaler. This has been happening with every refill.**    PER TRIAGE NOTE IN PATIENT'S CHART:  5/21/24   RAJWINDER Perales has refused multiple requests for albuterol inhalers. Patient needs to be seen.  He has been filling inhalers multiple times per month.    NEEDS TO BE SEEN for albuterol requests.    Dr. Gurerero, please kindly review; protocol failed    No future appointments.  Last office visit: 12/23/24 ([facility] Tele[facility] - Dr. Layton)    **Should patient be seeing Pulmonology or have a daily inhaler?    Requested Prescriptions   Pending Prescriptions Disp Refills    albuterol 108 (90 Base) MCG/ACT Inhalation Aero Soln 18 g 0     Sig: Inhale 2 puffs into the lungs every 4 (four) hours as needed for Wheezing.       Asthma & COPD Medication Protocol Failed - 1/2/2025 12:10 PM        Failed - ACT Score greater than or equal to 20                Failed - ACT recorded in the last 12 months                Passed - Appointment in past 6 or next 3 months      Recent Outpatient Visits              1 week ago Acute bacterial sinusitis    [facility], Javi Sheehan MD    Telemedicine    1 month ago Acute cough    [facility], [place], David B, DO    Office Visit    3 months ago Prostate cancer screening    [facility], Darnell Kaplan MD    Office Visit    5 months ago MADIE (obstructive sleep apnea)    [facility], Luis Armando Roy MD    Office Visit    6 months ago MADIE (obstructive sleep apnea)    [facility], Luis Armando Valdez MD    Office Visit                             Recent Outpatient Visits              1 week ago Acute bacterial sinusitis    [facility], Javi Sheehan MD    Telemedicine    1 month ago Acute cough    [facility], [place], David B, DO    Office Visit    3 months ago Prostate cancer screening    [facility], Darnell Kaplan MD    Office Visit    5 months ago MADIE (obstructive sleep apnea)    [facility], Luis Armando Roy MD    Office Visit    6 months ago MADIE (obstructive sleep apnea)    [facility], Luis Armando Valdez MD    Office Visit

## 2025-01-02 NOTE — TELEPHONE ENCOUNTER
[place] calling  from sukhjinder ( name and date of birth of patient verified ) requesting RX of Albuterol     Medication pended to run thru Protocol

## 2025-01-03 RX ORDER — ALBUTEROL SULFATE 90 UG/1
INHALANT RESPIRATORY (INHALATION)
OUTPATIENT
Start: 2025-01-03

## 2025-01-03 RX ORDER — ALBUTEROL SULFATE 90 UG/1
2 INHALANT RESPIRATORY (INHALATION) EVERY 4 HOURS PRN
Qty: 8.5 G | Refills: 0 | Status: SHIPPED | OUTPATIENT
Start: 2025-01-03 | End: 2025-01-05

## 2025-01-09 RX ORDER — ALBUTEROL SULFATE 90 UG/1
2 INHALANT RESPIRATORY (INHALATION) EVERY 4 HOURS PRN
Qty: 8.5 G | Refills: 0 | Status: SHIPPED | OUTPATIENT
Start: 2025-01-09

## 2025-01-09 RX ORDER — ALBUTEROL SULFATE 90 UG/1
2 INHALANT RESPIRATORY (INHALATION) EVERY 4 HOURS PRN
Qty: 8.5 G | Refills: 0 | OUTPATIENT
Start: 2025-01-09

## 2025-01-09 NOTE — TELEPHONE ENCOUNTER
Contact Information  [phone] (Home Phone)   [phone] (Mobile)     I called the patient, he is upset and is asking to talk with someone else about his refills. He was cutting me off, would not let me talk and kept saying:  He does not want Yari Perales on any med refills, he is not sending these to her and he sees Dr. Guerrero and he should be filling his medications. He states he has never seen Yari Perales and he does not know who she is.     I advised that he needs appointment for refills of his inhaler. Last time he has seen Dr. Guerrero ws June 2024, patient says that is not true and he has seen him since. No visits seen in chart review.     He states several times that he does not need to come in, he has seen Dr. Guerrero and he knows what he needs and he just needs his medication filled     I attempted to explain why he needs appointment (need to review use of medication and this is meant to be a rescue inhaler) and patient won't listen, keeps talking over me and he cuts me off, won't let me talk and when I asked when he was done talking if I could talk he said \"No, I just need my medication refilled\". And the above would start over again.     Routing to Dr. Guerrero. Patient has had several refills of inhaler, refuses appointment and wants several refills put on the medication RX when sent in.

## 2025-01-09 NOTE — TELEPHONE ENCOUNTER
Patient is known for over using Albuterol Inhaler.   Patient sends aggressive my-chart messages demanding more medication.     There is a blue sticky note in chart from June stating that Yari PURDY refuses refill request for Albuterol note also says patient needs to be seen for refills    Can an RN call patient to explain why albuterol inhalers are being denied ( for over use).

## 2025-01-20 ENCOUNTER — TELEPHONE (OUTPATIENT)
Dept: FAMILY MEDICINE CLINIC | Facility: CLINIC | Age: 45
End: 2025-01-20

## 2025-01-20 DIAGNOSIS — M54.50 ACUTE RIGHT-SIDED LOW BACK PAIN, UNSPECIFIED WHETHER SCIATICA PRESENT: ICD-10-CM

## 2025-01-24 NOTE — TELEPHONE ENCOUNTER
Safety concern - high albuterol use  Patient filled on 11/9, 11/22, 12/17, 1/3, 1/15, asking for refill        Norco 10mg Recent fills each # 45 : 8/30, 10/24, 12/17  Last prescription written: 12/17/24  Last office visit: 6/24/24

## 2025-01-25 RX ORDER — HYDROCODONE BITARTRATE AND ACETAMINOPHEN 10; 325 MG/1; MG/1
1 TABLET ORAL EVERY 8 HOURS PRN
Qty: 45 TABLET | Refills: 0 | OUTPATIENT
Start: 2025-01-25

## 2025-01-25 RX ORDER — ALBUTEROL SULFATE 90 UG/1
2 INHALANT RESPIRATORY (INHALATION) EVERY 4 HOURS PRN
Qty: 8.5 G | Refills: 0 | OUTPATIENT
Start: 2025-01-25

## 2025-02-03 DIAGNOSIS — M54.50 ACUTE RIGHT-SIDED LOW BACK PAIN, UNSPECIFIED WHETHER SCIATICA PRESENT: ICD-10-CM

## 2025-02-03 RX ORDER — ALBUTEROL SULFATE 90 UG/1
2 INHALANT RESPIRATORY (INHALATION) EVERY 4 HOURS PRN
Qty: 8.5 G | Refills: 0 | Status: CANCELLED | OUTPATIENT
Start: 2025-02-03

## 2025-02-06 ENCOUNTER — NURSE TRIAGE (OUTPATIENT)
Dept: FAMILY MEDICINE CLINIC | Facility: CLINIC | Age: 45
End: 2025-02-06

## 2025-02-06 RX ORDER — ALBUTEROL SULFATE 90 UG/1
2 INHALANT RESPIRATORY (INHALATION) EVERY 4 HOURS PRN
Qty: 8.5 G | Refills: 0 | Status: SHIPPED | OUTPATIENT
Start: 2025-02-06 | End: 2025-02-08

## 2025-02-06 NOTE — TELEPHONE ENCOUNTER
Action Requested: Summary for Provider     []  Critical Lab, Recommendations Needed  [x] Need Additional Advice  []   FYI    []   Need Orders  [] Need Medications Sent to Pharmacy  []  Other     SUMMARY: Disposition per protocol  is to be seen in office today.  Patient  adamantly declines appointment .     Patient with  history us  surgery and asthma reports Nasal and Sinus congestion, post nasal drip,  chest congestion, productive cough  x 1 week-10 days.   Occasional shortness of breath relieved with albuterol.   Denies  fever or chest pain/tightness.  He is using Flonase nasal spray with no relief.       Dr Guerrero, patient asks for an antibiotic prescription for sinus symptoms, and he is requesting a refill of Albuterol 108 mcg inhaler. Please advise. Albuterol order pended    Reason for call: Sinusitis  Onset:7-10 days     Patient calling,verified name and date of birth.  Requesting antibiotic prescription,  declines appointment. Reviewed care advice to continue home care, call back if symptoms worsen. Patient verbalizes understanding and agrees to plan of care.         Reason for Disposition   Sinus congestion (pressure, fullness) present > 10 days    Protocols used: Sinus Pain and Congestion-A-OH

## 2025-02-07 NOTE — TELEPHONE ENCOUNTER
Future Appointments   Date Time Provider Department Center   2/8/2025 11:45 AM Javi Layton MD [facility]

## 2025-02-07 NOTE — TELEPHONE ENCOUNTER
Please review. Protocol Failed; No Protocol      Recent fills: 10/24/2024, 12/17/2024  Last Rx written: 12/17/2024  Last office visit: 6/24/2024        Future Appointments   Date Time Provider Department Center   2/8/2025 11:45 AM Javi Layton MD [facility]       Requested Prescriptions   Pending Prescriptions Disp Refills    HYDROcodone-acetaminophen (NORCO)  MG Oral Tab 45 tablet 0     Sig: Take 1 tablet by mouth every 8 (eight) hours as needed. Will need office visit for further refills.       Controlled Substance Medication Failed - 2/7/2025  8:55 AM        Failed - This medication is a controlled substance - forward to provider to refill        Passed - Medication is active on med list               Future Appointments         Provider Department Appt Notes    [facility]          Recent Outpatient Visits              1 month ago Acute bacterial sinusitis    [facility] [place] Javi Layton MD    Telemedicine    3 months ago Acute cough    [facility], [place], David B, DO    Office Visit    4 months ago Prostate cancer screening    [facility], Darnell Kaplan MD    Office Visit    6 months ago MADIE (obstructive sleep apnea)    [facility], Luis Armando Roy MD    Office Visit    7 months ago MADIE (obstructive sleep apnea)    [facility], Luis Armando Valdez MD    Office Visit

## 2025-02-08 ENCOUNTER — OFFICE VISIT (OUTPATIENT)
Dept: INTERNAL MEDICINE CLINIC | Facility: CLINIC | Age: 45
End: 2025-02-08

## 2025-02-08 VITALS
WEIGHT: 261 LBS | SYSTOLIC BLOOD PRESSURE: 129 MMHG | BODY MASS INDEX: 38.66 KG/M2 | HEART RATE: 79 BPM | OXYGEN SATURATION: 98 % | TEMPERATURE: 98 F | DIASTOLIC BLOOD PRESSURE: 83 MMHG | HEIGHT: 69 IN

## 2025-02-08 DIAGNOSIS — J32.9 RECURRENT SINUS INFECTIONS: Primary | ICD-10-CM

## 2025-02-08 DIAGNOSIS — J45.21 MILD INTERMITTENT ASTHMA WITH ACUTE EXACERBATION (HCC): ICD-10-CM

## 2025-02-08 PROCEDURE — 99214 OFFICE O/P EST MOD 30 MIN: CPT | Performed by: STUDENT IN AN ORGANIZED HEALTH CARE EDUCATION/TRAINING PROGRAM

## 2025-02-08 RX ORDER — ALBUTEROL SULFATE 90 UG/1
2 INHALANT RESPIRATORY (INHALATION) EVERY 4 HOURS PRN
Qty: 8.5 G | Refills: 0 | Status: SHIPPED | OUTPATIENT
Start: 2025-02-08

## 2025-02-08 RX ORDER — PREDNISONE 10 MG/1
TABLET ORAL
Qty: 30 TABLET | Refills: 0 | Status: SHIPPED | OUTPATIENT
Start: 2025-02-08 | End: 2025-02-20

## 2025-02-08 NOTE — PROGRESS NOTES
OFFICE NOTE     Patient ID: Kolby Villa is a 44 year old male.  Today's Date: 02/08/25  Chief Complaint: Sinusitis (Cough and congestion/post nasal drip and would like refills on albuterol)        Pt is a 45y/o male with PMHx of pituitary apoplexa, candida esophagitis,asthma, HLD, HTN, Asthma, Obesity, Vitamin D def, GERD, Eosinophilic esophagitis whom presents to clinic with recurrent sinusitis and flareup of asthma        Vitals:    02/08/25 1118   BP: 129/83   Pulse: 79   Temp: 98.3 °F (36.8 °C)   TempSrc: Tympanic   SpO2: 98%   Weight: 261 lb (118.4 kg)   Height: 5' 9\" (1.753 m)     body mass index is 38.54 kg/m².  BP Readings from Last 3 Encounters:   02/08/25 129/83   11/08/24 137/81   10/02/24 150/83     The 10-year ASCVD risk score (Zeke WALKER, et al., 2019) is: 2.3%    Values used to calculate the score:      Age: 44 years      Sex: Male      Is Non- : No      Diabetic: No      Tobacco smoker: No      Systolic Blood Pressure: 129 mmHg      Is BP treated: Yes      HDL Cholesterol: 36 mg/dL      Total Cholesterol: 176 mg/dL      Medications reviewed:  Current Outpatient Medications   Medication Sig Dispense Refill    amoxicillin clavulanate 875-125 MG Oral Tab Take 1 tablet by mouth 2 (two) times daily for 14 days. 28 tablet 0    predniSONE 10 MG Oral Tab Take 4 tablets (40 mg total) by mouth daily for 3 days, THEN 3 tablets (30 mg total) daily for 3 days, THEN 2 tablets (20 mg total) daily for 3 days, THEN 1 tablet (10 mg total) daily for 3 days. TAKE WITH FOOD.. 30 tablet 0    albuterol 108 (90 Base) MCG/ACT Inhalation Aero Soln Inhale 2 puffs into the lungs every 4 (four) hours as needed for Wheezing. 8.5 g 0    pravastatin 10 MG Oral Tab Take 1 tablet (10 mg total) by mouth nightly. 90 tablet 3    levocetirizine 5 MG Oral Tab Take 1 tablet (5 mg total) by mouth every evening. 90 tablet 3    azelastine 137 MCG/SPRAY Nasal Solution 1-2 sprays by Nasal route in  the morning and 1-2 sprays before bedtime. FOR SINUS SYMPTOMS/NASAL CONGESTION.. 30 mL 11    HYDROcodone-acetaminophen (NORCO)  MG Oral Tab Take 1 tablet by mouth every 8 (eight) hours as needed. Will need office visit for further refills. 45 tablet 0    amLODIPine 5 MG Oral Tab Take 1 tablet (5 mg total) by mouth daily. 90 tablet 1    telmisartan 40 MG Oral Tab Take 1 tablet (40 mg total) by mouth daily. 90 tablet 1    fluticasone propionate 50 MCG/ACT Nasal Suspension 2 sprays by Each Nare route daily. FOR NASAL CONGESTION/SINUS SYMPTOMS. 1 each 11    predniSONE 20 MG Oral Tab TAKE 1 TAB 3 TIMES DAILY (Patient not taking: Reported on 2/8/2025) 12 tablet 0    polymyxin B-trimethoprim 90687-2.1 UNIT/ML-% Ophthalmic Solution Place 1 drop into both eyes every 6 (six) hours. (Patient not taking: Reported on 2/8/2025) 1 each 0    Ursodiol 500 MG Oral Tab Take 1 tablet (500 mg total) by mouth daily. (Patient not taking: Reported on 2/8/2025) 90 tablet 1         Assessment & Plan    1. Recurrent sinus infections (Primary)  -     ENT Referral - In Network  -     Amoxicillin-Pot Clavulanate; Take 1 tablet by mouth 2 (two) times daily for 14 days.  Dispense: 28 tablet; Refill: 0  -     predniSONE; Take 4 tablets (40 mg total) by mouth daily for 3 days, THEN 3 tablets (30 mg total) daily for 3 days, THEN 2 tablets (20 mg total) daily for 3 days, THEN 1 tablet (10 mg total) daily for 3 days. TAKE WITH FOOD..  Dispense: 30 tablet; Refill: 0  Patient presenting URI and now sinus congestion tenderness and erythematous nasal turbinates consistent with acute secondary acute bacterial rhinosinusitis.  Plan:  -take hot showers, drink tea and increase fluid intake   -Start Augmentin 1 tablet twice daily for 10 days  - pt educated while taking antibiotics should also take OTC priobiotics daily and/or natural probiotics such as greek yogurt/Kefir to help prevent development of C.Diff.  Flonase(Fluticazone generic acceptable) 1-2  puff each nostril twice daily for next month or till symptom resolves, Azelastine 1-2 puff each nostril twice daily for next month or till symptom resolves, Prednsione taper  -If no improvement, referral given to ENT for further evaluation and evaluation of possible nasal polyp or if CT sinus warranted  -If no improvement also able to follow up with myself follow up in 10-14 days if needs antibiotic duration and agent if warranted   2. Mild intermittent asthma with acute exacerbation (HCC)  -     Albuterol Sulfate HFA; Inhale 2 puffs into the lungs every 4 (four) hours as needed for Wheezing.  Dispense: 8.5 g; Refill: 0  Plan  Chronic, well controlled on current medications, denies adverse effects, continue with present management.      Follow Up: As needed/if symptoms worsen or No follow-ups on file..     Objective/ Results:   Physical Exam  Constitutional:       Appearance: Normal appearance.   HENT:      Nose: Congestion and rhinorrhea present.      Right Turbinates: Enlarged and swollen.      Left Turbinates: Enlarged and swollen.   Cardiovascular:      Rate and Rhythm: Normal rate and regular rhythm.   Pulmonary:      Effort: Pulmonary effort is normal.      Breath sounds: Wheezing present.   Neurological:      Mental Status: He is alert.        Reviewed:    Patient Active Problem List    Diagnosis    Hematoma    Calculus of gallbladder without cholecystitis without obstruction    Vitamin D deficiency    Other hyperlipidemia    Obesity (BMI 35.0-39.9 without comorbidity)    Essential hypertension    Candida esophagitis (HCC)    Eosinophilic esophagitis    Esophagitis    Gastritis    Spleen anomaly    Gastroesophageal reflux disease    Floppy eyelid syndrome    Moderate persistent asthma without complication (HCC)    Pituitary abnormality (HCC)    Pituitary apoplexy (HCC)    Allergic rhinitis      Allergies[1]     Social History     Socioeconomic History    Marital status: Single    Number of children: 1    Occupational History    Occupation: /[employer]   Tobacco Use    Smoking status: Never     Passive exposure: Never    Smokeless tobacco: Never    Tobacco comments:     Girlfriend smokes outside   Vaping Use    Vaping status: Never Used   Substance and Sexual Activity    Alcohol use: Never     Alcohol/week: 0.0 standard drinks of alcohol    Drug use: No   Other Topics Concern    Caffeine Concern Yes     Comment: 2 cups tea daily      Review of Systems   Constitutional:  Positive for fatigue and fever.   HENT:  Positive for postnasal drip, rhinorrhea, sinus pressure, sinus pain and sore throat.    Respiratory:  Positive for cough and wheezing.    Cardiovascular: Negative.    Gastrointestinal: Negative.    Skin: Negative.    Neurological: Negative.      All other systems negative unless otherwise stated in ROS or HPI above.       Javi Layton MD  Internal Medicine       Call office with any questions or seek emergency care if necessary.   Patient understands and agrees to follow directions and advice.      ----------------------------------------- PATIENT INSTRUCTIONS-----------------------------------------     There are no Patient Instructions on file for this visit.        The 21st Century Cures Act makes medical notes available to patients in the interest of transparency.  However, please be advised that this is a medical document.  It is intended as a peer to peer communication.  It is written in medical language and may contain abbreviations or verbiage that are technical and unfamiliar.  It may appear blunt or direct.  Medical documents are intended to carry relevant information, facts as evident, and the clinical opinion of the practitioner.        [1]   Allergies  Allergen Reactions    Peas SWELLING and OTHER (SEE COMMENTS)     Facial swelling    Strawberries RASH and SWELLING     Facial swelling    Atorvastatin OTHER (SEE COMMENTS)     Other reaction(s): ruq abd pain    Ibuprofen OTHER (SEE  COMMENTS)     Other reaction(s): Trouble Breathing

## 2025-02-10 RX ORDER — HYDROCODONE BITARTRATE AND ACETAMINOPHEN 10; 325 MG/1; MG/1
1 TABLET ORAL EVERY 8 HOURS PRN
Qty: 45 TABLET | Refills: 0 | OUTPATIENT
Start: 2025-02-10

## 2025-02-22 DIAGNOSIS — J45.21 MILD INTERMITTENT ASTHMA WITH ACUTE EXACERBATION (HCC): ICD-10-CM

## 2025-02-22 DIAGNOSIS — M54.50 ACUTE RIGHT-SIDED LOW BACK PAIN, UNSPECIFIED WHETHER SCIATICA PRESENT: ICD-10-CM

## 2025-02-26 DIAGNOSIS — J45.21 MILD INTERMITTENT ASTHMA WITH ACUTE EXACERBATION (HCC): ICD-10-CM

## 2025-02-26 DIAGNOSIS — M54.50 ACUTE RIGHT-SIDED LOW BACK PAIN, UNSPECIFIED WHETHER SCIATICA PRESENT: ICD-10-CM

## 2025-02-26 RX ORDER — HYDROCODONE BITARTRATE AND ACETAMINOPHEN 10; 325 MG/1; MG/1
1 TABLET ORAL EVERY 8 HOURS PRN
Qty: 45 TABLET | Refills: 0 | OUTPATIENT
Start: 2025-02-26

## 2025-02-26 RX ORDER — ALBUTEROL SULFATE 90 UG/1
2 INHALANT RESPIRATORY (INHALATION) EVERY 4 HOURS PRN
Qty: 8.5 G | Refills: 0 | OUTPATIENT
Start: 2025-02-26

## 2025-02-26 NOTE — TELEPHONE ENCOUNTER
Due to the many notes concerning patient's safety with the excessive use of albuterol inhaler, this refill request has been escalated to management.    This concern started 5/2024.

## 2025-02-26 NOTE — TELEPHONE ENCOUNTER
Please kindly review this medication    [x] FAILS PROTOCOL    [] HAS NO PROTOCOL ATTACHED    Recent fill dates: 12/17/24, and 10/24/24  Date of  last written prescription: 12/17/24   Last written quantity: #45 each and processed as a 15 day supply  [x] Takes as needed  [] Takes scheduled daily

## 2025-02-28 RX ORDER — HYDROCODONE BITARTRATE AND ACETAMINOPHEN 10; 325 MG/1; MG/1
1 TABLET ORAL EVERY 8 HOURS PRN
Qty: 45 TABLET | Refills: 0 | OUTPATIENT
Start: 2025-02-28

## 2025-03-03 DIAGNOSIS — M54.50 ACUTE RIGHT-SIDED LOW BACK PAIN, UNSPECIFIED WHETHER SCIATICA PRESENT: ICD-10-CM

## 2025-03-03 DIAGNOSIS — J45.21 MILD INTERMITTENT ASTHMA WITH ACUTE EXACERBATION (HCC): ICD-10-CM

## 2025-03-03 RX ORDER — ALBUTEROL SULFATE 90 UG/1
2 INHALANT RESPIRATORY (INHALATION) EVERY 4 HOURS PRN
Qty: 8.5 G | Refills: 0 | OUTPATIENT
Start: 2025-03-03

## 2025-03-03 RX ORDER — AMLODIPINE BESYLATE 5 MG/1
5 TABLET ORAL DAILY
Qty: 90 TABLET | Refills: 1 | OUTPATIENT
Start: 2025-03-03

## 2025-03-03 RX ORDER — HYDROCODONE BITARTRATE AND ACETAMINOPHEN 10; 325 MG/1; MG/1
1 TABLET ORAL EVERY 8 HOURS PRN
Qty: 45 TABLET | Refills: 0 | OUTPATIENT
Start: 2025-03-03

## 2025-03-03 RX ORDER — TELMISARTAN 40 MG/1
40 TABLET ORAL DAILY
Qty: 90 TABLET | Refills: 1 | OUTPATIENT
Start: 2025-03-03

## 2025-03-03 NOTE — TELEPHONE ENCOUNTER
Refills at pharmacy    Outpatient Medication Detail   Disp Refills Start End    amLODIPine 5 MG Oral Tab 90 tablet 1 10/8/2024 --    Sig - Route: Take 1 tablet (5 mg total) by mouth daily. - Oral    Sent to pharmacy as: amLODIPine Besylate 5 MG Oral Tablet (Norvasc)    E-Prescribing Status: Receipt confirmed by pharmacy (10/8/2024  6:06 PM CDT)    Pharmacy  [id number]  [place], [place] - [address] AT [facility], [phone], [phone]       Outpatient Medication Detail   Disp Refills Start End    telmisartan 40 MG Oral Tab 90 tablet 1 10/8/2024 --    Sig - Route: Take 1 tablet (40 mg total) by mouth daily. - Oral    Sent to pharmacy as: Telmisartan 40 MG Oral Tablet (Micardis)    E-Prescribing Status: Receipt confirmed by pharmacy (10/8/2024  6:07 PM CDT)    Pharmacy  [facility] #[id number]  [place], [place] - [address] AT [facility], [phone], [phone]

## 2025-03-05 ENCOUNTER — PATIENT MESSAGE (OUTPATIENT)
Dept: FAMILY MEDICINE CLINIC | Facility: CLINIC | Age: 45
End: 2025-03-05

## 2025-03-05 DIAGNOSIS — J45.21 MILD INTERMITTENT ASTHMA WITH ACUTE EXACERBATION (HCC): ICD-10-CM

## 2025-03-05 RX ORDER — ALBUTEROL SULFATE 90 UG/1
2 INHALANT RESPIRATORY (INHALATION) EVERY 4 HOURS PRN
Qty: 8.5 G | Refills: 0 | OUTPATIENT
Start: 2025-03-05

## 2025-03-05 NOTE — TELEPHONE ENCOUNTER
Please kindly review this medication    [x] FAILS PROTOCOL    [] HAS NO PROTOCOL ATTACHED    Safety Concern: patient overusing albuterol inhaler.     Safety Concern since 5/2024.    Patient has a history of being verbally aggressive over the phone to nursing.    Should a referral for Pulmonology be entered?    Should patient be using a daily inhaler?

## 2025-03-06 RX ORDER — ALBUTEROL SULFATE 90 UG/1
2 INHALANT RESPIRATORY (INHALATION) EVERY 4 HOURS PRN
Qty: 8.5 G | Refills: 3 | Status: SHIPPED | OUTPATIENT
Start: 2025-03-06

## 2025-03-17 ENCOUNTER — OFFICE VISIT (OUTPATIENT)
Dept: FAMILY MEDICINE CLINIC | Facility: CLINIC | Age: 45
End: 2025-03-17

## 2025-03-17 VITALS
WEIGHT: 266 LBS | SYSTOLIC BLOOD PRESSURE: 152 MMHG | DIASTOLIC BLOOD PRESSURE: 89 MMHG | BODY MASS INDEX: 39.4 KG/M2 | HEIGHT: 69 IN | HEART RATE: 86 BPM | OXYGEN SATURATION: 98 %

## 2025-03-17 DIAGNOSIS — M54.50 ACUTE RIGHT-SIDED LOW BACK PAIN, UNSPECIFIED WHETHER SCIATICA PRESENT: ICD-10-CM

## 2025-03-17 DIAGNOSIS — M47.816 LUMBAR SPONDYLOSIS: ICD-10-CM

## 2025-03-17 DIAGNOSIS — J30.89 NON-SEASONAL ALLERGIC RHINITIS, UNSPECIFIED TRIGGER: ICD-10-CM

## 2025-03-17 DIAGNOSIS — I10 ESSENTIAL HYPERTENSION: ICD-10-CM

## 2025-03-17 DIAGNOSIS — E78.49 OTHER HYPERLIPIDEMIA: ICD-10-CM

## 2025-03-17 DIAGNOSIS — K21.00 GASTROESOPHAGEAL REFLUX DISEASE WITH ESOPHAGITIS WITHOUT HEMORRHAGE: ICD-10-CM

## 2025-03-17 DIAGNOSIS — Z00.00 ROUTINE GENERAL MEDICAL EXAMINATION AT A HEALTH CARE FACILITY: Primary | ICD-10-CM

## 2025-03-17 DIAGNOSIS — J45.40 MODERATE PERSISTENT ASTHMA WITHOUT COMPLICATION (HCC): ICD-10-CM

## 2025-03-17 PROCEDURE — 99396 PREV VISIT EST AGE 40-64: CPT | Performed by: FAMILY MEDICINE

## 2025-03-17 RX ORDER — HYDROCODONE BITARTRATE AND ACETAMINOPHEN 10; 325 MG/1; MG/1
1 TABLET ORAL EVERY 8 HOURS PRN
Qty: 45 TABLET | Refills: 0 | Status: SHIPPED | OUTPATIENT
Start: 2025-03-17

## 2025-03-17 RX ORDER — FLUTICASONE PROPIONATE AND SALMETEROL 250; 50 UG/1; UG/1
1 POWDER RESPIRATORY (INHALATION) 2 TIMES DAILY
Qty: 1 EACH | Refills: 5 | Status: SHIPPED | OUTPATIENT
Start: 2025-03-17

## 2025-03-17 NOTE — PROGRESS NOTES
Subjective:   Kolby Villa is a 44 year old male who presents for Physical   Medication refills.     History/Other:    Chief Complaint Reviewed and Verified  No Further Nursing Notes to   Review  Tobacco Reviewed  Allergies Reviewed  Problem List Reviewed    Medical History Reviewed  Surgical History Reviewed  Family History   Reviewed  Social History Reviewed         Tobacco:  He has never smoked tobacco.    Current Outpatient Medications   Medication Sig Dispense Refill    fluticasone-salmeterol 250-50 MCG/ACT Inhalation Aerosol Powder, Breath Activated Inhale 1 puff into the lungs 2 (two) times daily. 1 each 5    HYDROcodone-acetaminophen (NORCO)  MG Oral Tab Take 1 tablet by mouth every 8 (eight) hours as needed. Will need office visit for further refills. 45 tablet 0    albuterol 108 (90 Base) MCG/ACT Inhalation Aero Soln Inhale 2 puffs into the lungs every 4 (four) hours as needed for Wheezing. 8.5 g 3    pravastatin 10 MG Oral Tab Take 1 tablet (10 mg total) by mouth nightly. 90 tablet 3    azelastine 137 MCG/SPRAY Nasal Solution 1-2 sprays by Nasal route in the morning and 1-2 sprays before bedtime. FOR SINUS SYMPTOMS/NASAL CONGESTION.. 30 mL 11    fluticasone propionate 50 MCG/ACT Nasal Suspension 2 sprays by Each Nare route daily. FOR NASAL CONGESTION/SINUS SYMPTOMS. 1 each 11    amLODIPine 5 MG Oral Tab Take 1 tablet (5 mg total) by mouth daily. 90 tablet 1    telmisartan 40 MG Oral Tab Take 1 tablet (40 mg total) by mouth daily. 90 tablet 1         Review of Systems:  Review of Systems   Constitutional: Negative.    HENT: Negative.     Eyes: Negative.    Respiratory:  Positive for cough and shortness of breath.    Cardiovascular: Negative.    Gastrointestinal: Negative.    Genitourinary: Negative.    Musculoskeletal:  Positive for back pain.   Skin: Negative.    Neurological: Negative.    Psychiatric/Behavioral: Negative.           Objective:   /89   Pulse 86   Ht 5' 9\"  (1.753 m)   Wt 266 lb (120.7 kg)   SpO2 98%   BMI 39.28 kg/m²  Estimated body mass index is 39.28 kg/m² as calculated from the following:    Height as of this encounter: 5' 9\" (1.753 m).    Weight as of this encounter: 266 lb (120.7 kg).  Physical Exam  Vitals reviewed.   Constitutional:       Appearance: He is well-developed.   HENT:      Head: Normocephalic and atraumatic.      Right Ear: External ear normal.      Left Ear: External ear normal.      Nose: Nose normal.   Eyes:      Conjunctiva/sclera: Conjunctivae normal.      Pupils: Pupils are equal, round, and reactive to light.   Cardiovascular:      Rate and Rhythm: Normal rate and regular rhythm.      Heart sounds: Normal heart sounds.   Pulmonary:      Effort: Pulmonary effort is normal.      Breath sounds: Normal breath sounds.   Abdominal:      General: Bowel sounds are normal.      Palpations: Abdomen is soft.   Musculoskeletal:      Cervical back: Normal range of motion and neck supple.      Lumbar back: Tenderness present. Decreased range of motion.   Skin:     General: Skin is warm and dry.      Findings: No rash.   Neurological:      Mental Status: He is alert and oriented to person, place, and time.      Deep Tendon Reflexes: Reflexes are normal and symmetric.   Psychiatric:         Mood and Affect: Mood is not anxious or depressed.           Assessment & Plan:   1. Routine general medical examination at a health care facility (Primary)  -     CBC With Differential With Platelet; Future; Expected date: 03/17/2025  -     Comp Metabolic Panel (14); Future; Expected date: 03/17/2025  -     Lipid Panel; Future; Expected date: 03/17/2025  -     Vitamin D; Future; Expected date: 03/17/2025  2. Non-seasonal allergic rhinitis, unspecified trigger  3. Essential hypertension  4. Gastroesophageal reflux disease with esophagitis without hemorrhage  5. Moderate persistent asthma without complication (HCC)  6. Other hyperlipidemia  7. Lumbar spondylosis  -      HYDROcodone-Acetaminophen; Take 1 tablet by mouth every 8 (eight) hours as needed. Will need office visit for further refills.  Dispense: 45 tablet; Refill: 0  8. Acute right-sided low back pain, unspecified whether sciatica present  -     HYDROcodone-Acetaminophen; Take 1 tablet by mouth every 8 (eight) hours as needed. Will need office visit for further refills.  Dispense: 45 tablet; Refill: 0  Other orders  -     Fluticasone-Salmeterol; Inhale 1 puff into the lungs 2 (two) times daily.  Dispense: 1 each; Refill: 5  Consider adding advair to help daily control of his asthma allergy symptoms.  Relies on albuterol only.  Uses norco most days for low back pain usually to sleep.  May continue med and follow up for refills in six months.  Labs ordered.          No follow-ups on file.    Albert Guerrero DO, 3/17/2025, 6:18 PM

## 2025-03-18 ENCOUNTER — TELEPHONE (OUTPATIENT)
Dept: FAMILY MEDICINE CLINIC | Facility: CLINIC | Age: 45
End: 2025-03-18

## 2025-03-18 ENCOUNTER — LAB ENCOUNTER (OUTPATIENT)
Dept: LAB | Age: 45
End: 2025-03-18
Attending: FAMILY MEDICINE
Payer: COMMERCIAL

## 2025-03-18 DIAGNOSIS — Z00.00 ROUTINE GENERAL MEDICAL EXAMINATION AT A HEALTH CARE FACILITY: ICD-10-CM

## 2025-03-18 LAB
ALBUMIN SERPL-MCNC: 4.7 G/DL (ref 3.2–4.8)
ALBUMIN/GLOB SERPL: 1.8 {RATIO} (ref 1–2)
ALP LIVER SERPL-CCNC: 64 U/L
ALT SERPL-CCNC: 32 U/L
ANION GAP SERPL CALC-SCNC: 6 MMOL/L (ref 0–18)
AST SERPL-CCNC: 25 U/L (ref ?–34)
BASOPHILS # BLD AUTO: 0.1 X10(3) UL (ref 0–0.2)
BASOPHILS NFR BLD AUTO: 1.1 %
BILIRUB SERPL-MCNC: 0.6 MG/DL (ref 0.3–1.2)
BUN BLD-MCNC: 18 MG/DL (ref 9–23)
BUN/CREAT SERPL: 20.5 (ref 10–20)
CALCIUM BLD-MCNC: 9.5 MG/DL (ref 8.7–10.4)
CHLORIDE SERPL-SCNC: 105 MMOL/L (ref 98–112)
CHOLEST SERPL-MCNC: 205 MG/DL (ref ?–200)
CO2 SERPL-SCNC: 29 MMOL/L (ref 21–32)
CREAT BLD-MCNC: 0.88 MG/DL
DEPRECATED RDW RBC AUTO: 39 FL (ref 35.1–46.3)
EGFRCR SERPLBLD CKD-EPI 2021: 109 ML/MIN/1.73M2 (ref 60–?)
EOSINOPHIL # BLD AUTO: 0.33 X10(3) UL (ref 0–0.7)
EOSINOPHIL NFR BLD AUTO: 3.5 %
ERYTHROCYTE [DISTWIDTH] IN BLOOD BY AUTOMATED COUNT: 12.8 % (ref 11–15)
FASTING PATIENT LIPID ANSWER: YES
FASTING STATUS PATIENT QL REPORTED: YES
GLOBULIN PLAS-MCNC: 2.6 G/DL (ref 2–3.5)
GLUCOSE BLD-MCNC: 82 MG/DL (ref 70–99)
HCT VFR BLD AUTO: 44 %
HDLC SERPL-MCNC: 35 MG/DL (ref 40–59)
HGB BLD-MCNC: 14.7 G/DL
IMM GRANULOCYTES # BLD AUTO: 0.03 X10(3) UL (ref 0–1)
IMM GRANULOCYTES NFR BLD: 0.3 %
LDLC SERPL CALC-MCNC: 124 MG/DL (ref ?–100)
LYMPHOCYTES # BLD AUTO: 3.09 X10(3) UL (ref 1–4)
LYMPHOCYTES NFR BLD AUTO: 32.8 %
MCH RBC QN AUTO: 27.9 PG (ref 26–34)
MCHC RBC AUTO-ENTMCNC: 33.4 G/DL (ref 31–37)
MCV RBC AUTO: 83.5 FL
MONOCYTES # BLD AUTO: 0.58 X10(3) UL (ref 0.1–1)
MONOCYTES NFR BLD AUTO: 6.2 %
NEUTROPHILS # BLD AUTO: 5.3 X10 (3) UL (ref 1.5–7.7)
NEUTROPHILS # BLD AUTO: 5.3 X10(3) UL (ref 1.5–7.7)
NEUTROPHILS NFR BLD AUTO: 56.1 %
NONHDLC SERPL-MCNC: 170 MG/DL (ref ?–130)
OSMOLALITY SERPL CALC.SUM OF ELEC: 291 MOSM/KG (ref 275–295)
PLATELET # BLD AUTO: 294 10(3)UL (ref 150–450)
POTASSIUM SERPL-SCNC: 4.5 MMOL/L (ref 3.5–5.1)
PROT SERPL-MCNC: 7.3 G/DL (ref 5.7–8.2)
RBC # BLD AUTO: 5.27 X10(6)UL
SODIUM SERPL-SCNC: 140 MMOL/L (ref 136–145)
TRIGL SERPL-MCNC: 262 MG/DL (ref 30–149)
VIT D+METAB SERPL-MCNC: 35 NG/ML (ref 30–100)
VLDLC SERPL CALC-MCNC: 47 MG/DL (ref 0–30)
WBC # BLD AUTO: 9.4 X10(3) UL (ref 4–11)

## 2025-03-18 PROCEDURE — 80053 COMPREHEN METABOLIC PANEL: CPT

## 2025-03-18 PROCEDURE — 36415 COLL VENOUS BLD VENIPUNCTURE: CPT

## 2025-03-18 PROCEDURE — 80061 LIPID PANEL: CPT

## 2025-03-18 PROCEDURE — 85025 COMPLETE CBC W/AUTO DIFF WBC: CPT

## 2025-03-18 PROCEDURE — 82306 VITAMIN D 25 HYDROXY: CPT

## 2025-03-18 NOTE — TELEPHONE ENCOUNTER
HYDROcodone-acetaminophen (NORCO)  MG Oral Tab, Take 1 tablet by mouth every 8 (eight) hours as needed. Will need office visit for further refills., Disp: 45 tablet, Rfl: 0    Key: WHIT

## 2025-03-18 NOTE — TELEPHONE ENCOUNTER
Patient called regarding a question regarding the following medication:     HYDROcodone-acetaminophen (NORCO)  MG Oral Tab     Patient states insurance would not cover the 45 tablet prescription ordered by Dr. Guerrero, and insurance would only cover 21 tablets at a time. Patient would like to know if the next refill is rejected for the 45 tablets, if prescription could be written for 21 tablets.

## 2025-03-19 ENCOUNTER — PATIENT MESSAGE (OUTPATIENT)
Dept: FAMILY MEDICINE CLINIC | Facility: CLINIC | Age: 45
End: 2025-03-19

## 2025-03-22 ENCOUNTER — TELEPHONE (OUTPATIENT)
Dept: FAMILY MEDICINE CLINIC | Facility: CLINIC | Age: 45
End: 2025-03-22

## 2025-03-22 NOTE — TELEPHONE ENCOUNTER
Spoke w/ Pt- job said he does not need TB test or immunizations TDAP/MMR at this time. Form on providers desk for signature.

## 2025-03-25 ENCOUNTER — PATIENT MESSAGE (OUTPATIENT)
Dept: FAMILY MEDICINE CLINIC | Facility: CLINIC | Age: 45
End: 2025-03-25

## 2025-03-25 DIAGNOSIS — E78.00 HYPERCHOLESTEROLEMIA: Primary | ICD-10-CM

## 2025-03-28 RX ORDER — PRAVASTATIN SODIUM 20 MG
20 TABLET ORAL NIGHTLY
Qty: 90 TABLET | Refills: 3 | Status: SHIPPED | OUTPATIENT
Start: 2025-03-28

## 2025-04-09 ENCOUNTER — PATIENT MESSAGE (OUTPATIENT)
Dept: FAMILY MEDICINE CLINIC | Facility: CLINIC | Age: 45
End: 2025-04-09

## 2025-04-09 DIAGNOSIS — M54.50 ACUTE RIGHT-SIDED LOW BACK PAIN, UNSPECIFIED WHETHER SCIATICA PRESENT: ICD-10-CM

## 2025-04-09 DIAGNOSIS — M47.816 LUMBAR SPONDYLOSIS: ICD-10-CM

## 2025-04-10 NOTE — TELEPHONE ENCOUNTER
Previously prescribed by Dr Guerrero -last prescription was in 2024.     Medication pended. Routing for protocol.    SENT AS HIGH PRIORITY due to symptoms.

## 2025-04-10 NOTE — TELEPHONE ENCOUNTER
Disp Refills Start End    albuterol 108 (90 Base) MCG/ACT Inhalation Aero Soln 18 g 1 9/19/2024 --    Sig - Route: Inhale 2 puffs into the lungs every 4 (four) hours as needed for Wheezing. - Inhalation    Sent to pharmacy as: Albuterol Sulfate  (90 Base) MCG/ACT Inhalation Aerosol Solution (Ventolin HFA)    E-Prescribing Status: Receipt confirmed by pharmacy (9/19/2024  5:59 PM CDT)      Pharmacy    [id number] - [place], [place] - [facility] [address] AT [facility], [phone], [phone]      see chief complaint quote

## 2025-04-10 NOTE — TELEPHONE ENCOUNTER
Please review; protocol failed/ has no protocol      Please see patients MyChart Message      Kolby Guido Rn Triage (supporting Albert Guerrero DO)20 hours ago (12:14 PM)       I need a prescription for the uridol for the gallbladder medicine. I stopped taking it for a while, but I think I need it back. I do have some symptoms or I do have some pain once in a while. I think I should’ve just kept continuing it so if Dr. Bliss could please fill. As a 30 day supply.

## 2025-04-11 RX ORDER — URSODIOL 500 MG/1
500 TABLET, FILM COATED ORAL DAILY
Qty: 30 TABLET | Refills: 0 | Status: SHIPPED | OUTPATIENT
Start: 2025-04-11

## 2025-04-14 NOTE — TELEPHONE ENCOUNTER
Medication(s) to Refill:   Requested Prescriptions     Pending Prescriptions Disp Refills    HYDROcodone-acetaminophen (NORCO)  MG Oral Tab 45 tablet 0     Sig: Take 1 tablet by mouth every 8 (eight) hours as needed. Will need office visit for further refills.         Reason for Medication Refill being sent to Provider / Reason Protocol Failed:  [x] Controlled Substance       Quantity: 21  Last Time Medication was Filled:  03/18/2025  Last Time Medication was Written : 03/17/2025      Last Office Visit : 03/17/2025

## 2025-04-15 ENCOUNTER — TELEPHONE (OUTPATIENT)
Dept: FAMILY MEDICINE CLINIC | Facility: CLINIC | Age: 45
End: 2025-04-15

## 2025-04-15 RX ORDER — HYDROCODONE BITARTRATE AND ACETAMINOPHEN 10; 325 MG/1; MG/1
1 TABLET ORAL EVERY 8 HOURS PRN
Qty: 45 TABLET | Refills: 0 | Status: SHIPPED | OUTPATIENT
Start: 2025-04-15

## 2025-04-15 NOTE — TELEPHONE ENCOUNTER
Patient called (identified name and ),   Has new [employer] insurance.  Needs prior authorization to get his Norco.  Was told can only get 7 days script to start.  Routed to Triage Support, please assist with PA.

## 2025-05-15 RX ORDER — URSODIOL 500 MG/1
500 TABLET, FILM COATED ORAL DAILY
Qty: 30 TABLET | Refills: 0 | Status: SHIPPED | OUTPATIENT
Start: 2025-05-15

## 2025-05-26 DIAGNOSIS — M54.50 ACUTE RIGHT-SIDED LOW BACK PAIN, UNSPECIFIED WHETHER SCIATICA PRESENT: ICD-10-CM

## 2025-05-26 DIAGNOSIS — J45.21 MILD INTERMITTENT ASTHMA WITH ACUTE EXACERBATION (HCC): ICD-10-CM

## 2025-05-26 DIAGNOSIS — M47.816 LUMBAR SPONDYLOSIS: ICD-10-CM

## 2025-05-27 RX ORDER — AMLODIPINE BESYLATE 5 MG/1
5 TABLET ORAL DAILY
Qty: 90 TABLET | Refills: 3 | Status: SHIPPED | OUTPATIENT
Start: 2025-05-27

## 2025-05-28 NOTE — TELEPHONE ENCOUNTER
Please review. Protocol Failed; No Protocol      Recent fills: 3/18/2025, 4/17/2025  Last Rx written: 4/15/2025  Last office visit: 3/17/2025

## 2025-05-30 RX ORDER — ALBUTEROL SULFATE 90 UG/1
2 INHALANT RESPIRATORY (INHALATION) EVERY 4 HOURS PRN
Qty: 8.5 G | Refills: 3 | Status: SHIPPED | OUTPATIENT
Start: 2025-05-30

## 2025-05-30 RX ORDER — HYDROCODONE BITARTRATE AND ACETAMINOPHEN 10; 325 MG/1; MG/1
1 TABLET ORAL EVERY 8 HOURS PRN
Qty: 45 TABLET | Refills: 0 | Status: SHIPPED | OUTPATIENT
Start: 2025-05-30

## 2025-06-20 DIAGNOSIS — M54.50 ACUTE RIGHT-SIDED LOW BACK PAIN, UNSPECIFIED WHETHER SCIATICA PRESENT: ICD-10-CM

## 2025-06-20 DIAGNOSIS — M47.816 LUMBAR SPONDYLOSIS: ICD-10-CM

## 2025-06-23 NOTE — TELEPHONE ENCOUNTER
Please review; protocol failed/ has no protocol      Recent fills: 05/30/2025,04/17/2025,03/18/2025  Last Rx written: 05/30/2025  Last Office Visit: 03/17/2025    Recent Visits  Date Type Provider Dept   03/17/25 Office Visit Albert Guerrero DO [facility]

## 2025-06-24 RX ORDER — HYDROCODONE BITARTRATE AND ACETAMINOPHEN 10; 325 MG/1; MG/1
1 TABLET ORAL EVERY 8 HOURS PRN
Qty: 45 TABLET | Refills: 0 | Status: SHIPPED | OUTPATIENT
Start: 2025-06-24

## 2025-06-27 RX ORDER — URSODIOL 500 MG/1
500 TABLET, FILM COATED ORAL DAILY
Qty: 30 TABLET | Refills: 0 | OUTPATIENT
Start: 2025-06-27

## 2025-06-27 NOTE — TELEPHONE ENCOUNTER
Please Review. Protocol Failed; No Protocol   BP Readings from Last 3 Encounters:   03/17/25 152/89   02/08/25 129/83   11/08/24 137/81     Patient is requesting only 30 day supplies   Medication pended for your review/approval

## 2025-06-28 RX ORDER — TELMISARTAN 40 MG/1
40 TABLET ORAL DAILY
Qty: 90 TABLET | Refills: 1 | Status: SHIPPED | OUTPATIENT
Start: 2025-06-28

## 2025-06-28 RX ORDER — URSODIOL 500 MG/1
500 TABLET, FILM COATED ORAL DAILY
Qty: 30 TABLET | Refills: 5 | Status: SHIPPED | OUTPATIENT
Start: 2025-06-28

## 2025-06-28 RX ORDER — TELMISARTAN 40 MG/1
40 TABLET ORAL DAILY
Qty: 30 TABLET | Refills: 5 | Status: SHIPPED | OUTPATIENT
Start: 2025-06-28

## 2025-06-30 ENCOUNTER — PATIENT MESSAGE (OUTPATIENT)
Dept: FAMILY MEDICINE CLINIC | Facility: CLINIC | Age: 45
End: 2025-06-30

## 2025-06-30 RX ORDER — AMLODIPINE BESYLATE 5 MG/1
5 TABLET ORAL DAILY
Qty: 90 TABLET | Refills: 3 | OUTPATIENT
Start: 2025-06-30

## 2025-07-02 NOTE — TELEPHONE ENCOUNTER
Patient would like to know if we can send telmisartan amlodipine combo instead of taking separately as he currently is    Pended for review.

## 2025-07-04 RX ORDER — TELMISARTAN AND AMLODIPINE 5; 40 MG/1; MG/1
1 TABLET ORAL DAILY
Qty: 90 TABLET | Refills: 1 | Status: SHIPPED | OUTPATIENT
Start: 2025-07-04 | End: 2025-08-03

## 2025-07-28 DIAGNOSIS — M47.816 LUMBAR SPONDYLOSIS: ICD-10-CM

## 2025-07-28 DIAGNOSIS — M54.50 ACUTE RIGHT-SIDED LOW BACK PAIN, UNSPECIFIED WHETHER SCIATICA PRESENT: ICD-10-CM

## 2025-07-30 RX ORDER — URSODIOL 500 MG/1
500 TABLET, FILM COATED ORAL DAILY
Qty: 30 TABLET | Refills: 5 | OUTPATIENT
Start: 2025-07-30

## 2025-08-01 RX ORDER — HYDROCODONE BITARTRATE AND ACETAMINOPHEN 10; 325 MG/1; MG/1
1 TABLET ORAL EVERY 8 HOURS PRN
Qty: 45 TABLET | Refills: 0 | Status: SHIPPED | OUTPATIENT
Start: 2025-08-01

## 2025-08-02 ENCOUNTER — TELEPHONE (OUTPATIENT)
Dept: FAMILY MEDICINE CLINIC | Facility: CLINIC | Age: 45
End: 2025-08-02

## 2025-08-26 ENCOUNTER — NURSE TRIAGE (OUTPATIENT)
Dept: FAMILY MEDICINE CLINIC | Facility: CLINIC | Age: 45
End: 2025-08-26

## 2025-08-28 RX ORDER — METHYLPREDNISOLONE 4 MG/1
TABLET ORAL
Qty: 1 EACH | Refills: 0 | Status: SHIPPED | OUTPATIENT
Start: 2025-08-28

## (undated) DIAGNOSIS — R09.81 NASAL CONGESTION: ICD-10-CM

## (undated) DIAGNOSIS — I10 ESSENTIAL HYPERTENSION: ICD-10-CM

## (undated) DIAGNOSIS — J32.9 RECURRENT SINUSITIS: Primary | ICD-10-CM

## (undated) DIAGNOSIS — J34.3 HYPERTROPHY OF NASAL TURBINATES: ICD-10-CM

## (undated) DEVICE — HEAD & NECK: Brand: MEDLINE INDUSTRIES, INC.

## (undated) DEVICE — INSTRUMENT TRACKER 9733533XOM ENT 1PK

## (undated) DEVICE — SHEATH 1912041 5PK ES2 STRYKER 4MM/30DEG: Brand: ENDO-SCRUB®

## (undated) DEVICE — GAMMEX® PI HYBRID SIZE 6.5, STERILE POWDER-FREE SURGICAL GLOVE, POLYISOPRENE AND NEOPRENE BLEND: Brand: GAMMEX

## (undated) DEVICE — NASAL ACCESSORY: Brand: MEDLINE INDUSTRIES, INC.

## (undated) DEVICE — SUCTION 9735018 MALLEABLE BALL TIP 9-FR

## (undated) DEVICE — BLADE SHVR 11CM 2MM XPS 360D

## (undated) DEVICE — SOL  .9 1000ML BTL

## (undated) DEVICE — FORCEP RADIAL JAW 4

## (undated) DEVICE — MEDI-VAC NON-CONDUCTIVE SUCTION TUBING: Brand: CARDINAL HEALTH

## (undated) DEVICE — Device: Brand: DEFENDO AIR/WATER/SUCTION AND BIOPSY VALVE

## (undated) DEVICE — EYE PADSSTERILENOT MADE WITH NATURAL RUBBER LATEXSINGLE USE ONLYDO NOT USE IF PACKAGE OPENED OR DAMAGED: Brand: CARDINAL HEALTH

## (undated) DEVICE — SYRINGE 10ML LL CONTRL SYRINGE

## (undated) DEVICE — WIPE WITH WICK AND CORNEAL SHIELD: Brand: MEROCEL

## (undated) DEVICE — ENDOSCOPY PACK UPPER: Brand: MEDLINE INDUSTRIES, INC.

## (undated) DEVICE — PATIENT TRACKER 9734887XOM NON-INVASIVE

## (undated) DEVICE — SHEATH 1912040 5PK ES2 STRYKER 4MM/0DEG: Brand: ENDO-SCRUB®

## (undated) DEVICE — SUTURE SILK 2-0 623H

## (undated) DEVICE — WIREGUIDED CYTOLOGY BRUSH: Brand: RX CYTOLOGY BRUSH

## (undated) DEVICE — ELECTROSURGICAL SUCTION COAGULATOR, 10FR

## (undated) DEVICE — MATTRESS PT HOVERMATT 1/2L 34W

## (undated) DEVICE — SUCTION CANISTER, 3000CC,SAFELINER: Brand: DEROYAL

## (undated) DEVICE — BLADE 1884380EM QUADCUT 4.3MMX13CM ROHS: Brand: ROTATABLE FUSION®

## (undated) NOTE — LETTER
03/04/21        [place]      Dear Nica Self,    [facility] records indicate that you have outstanding lab work and or testing that was ordered for you and has not yet been completed:  Orders Placed This Encounter

## (undated) NOTE — MR AVS SNAPSHOT
[facility]  [address]  [phone]               Thank you for choosing us for your health care visit with Georgina Vernon MD.  We are glad to serve you and happy to provide you with this summa * Albuterol Sulfate  (90 Base) MCG/ACT Aers   Inhale 2 puffs into the lungs every 4 (four) hours as needed for Wheezing.    Commonly known as:  PROAIR HFA           Budesonide-Formoterol Fumarate 160-4.5 MCG/ACT Aero   Inhale 2 puffs into the

## (undated) NOTE — LETTER
3/6/2024          To Whom It May Concern:    Kolby Villa is currently under my medical care and may not return to work at this time.    Please excuse Kolby for 1 weeks.  He may return to work on 3/11/2024.  Activity is restricted as follows: no prolonged standing for >30 minutes.    If you require additional information please contact our office.        Sincerely,      RAJWINDER Urbina          Document generated by:  RAJWINDER Urbina

## (undated) NOTE — MR AVS SNAPSHOT
[facility]  [address] [place] [address], [address] [place] [address]               Thank you for choosing us for your health care visit with Trevor Foreman. DO Rocio.   We are glad to serve you and happy to provide you with this summary This list is accurate as of: 1/12/17 10:31 AM.  Always use your most recent med list.                * albuterol sulfate (2.5 MG/3ML) 0.083% Nebu   Take 3 mL (2.5 mg total) by nebulization every 6 (six) hours as needed for Wheezing.    Commonly known as - Pantoprazole Sodium 40 MG Tbec            Follow-up Instructions     Return in about 3 months (around 4/12/2017).          Joshua                  Visit [facility] online at  [email or web address]

## (undated) NOTE — MR AVS SNAPSHOT
[facility]  [address], [address]  [address] [facility]  [phone]               Thank you for choosing us for your health care visit with Heena Telles. DO Rocio.   We are glad to serve you and happy to provide you with this swan Commonly known as:  VENTOLIN           * Albuterol Sulfate  (90 Base) MCG/ACT Aers   INHALE 2 PUFFS INTO THE LUNGS EVERY 4 HOURS AS NEEDED FOR WHEEZING   Commonly known as:  PROAIR HFA           budesonide 0.5 MG/2ML Susp   Take 2 mL (0.5 mg total) your doctor or other care provider to review them with you.          Where to Get Your Medications      You can get these medications from any pharmacy     Bring a paper prescription for each of these medications    - HYDROcodone-acetaminophen 5-325 MG Tabs

## (undated) NOTE — MR AVS SNAPSHOT
[facility] [address] [facility]  [address] [id number] [phone]               Thank you for choosing us for your health care visit with [address], MD.  We are glad to serve you and happy to provide you with this summary of yo Generic drug:  Ketotifen Fumarate   Place 2 drops into the right eye 2 (two) times daily. * albuterol sulfate (2.5 MG/3ML) 0.083% Nebu   Take 3 mL (2.5 mg total) by nebulization every 6 (six) hours as needed for Wheezing.    Commonly known as:  VE Commonly known as:  DELTASONE           SINGULAIR 10 MG Tabs   Generic drug:  Montelukast Sodium   Take 1 tablet (10 mg total) by mouth nightly. sucralfate 1 g Tabs   Take 2 tablets by mouth 2 (two) times daily.    What changed:  Another medicatio

## (undated) NOTE — LETTER
10/14/2020              [facility] [address]         Dear Regina Hernandez,    This letter is to inform you that our office has made several attempts to reach you by phone without success.   We were attempting to conta

## (undated) NOTE — MR AVS SNAPSHOT
[facility] [place] [address] [place]  [address] [phone]               Thank you for choosing us for your health care visit with Pattie Escalante MD.  We are glad to serve you and happy to provide you with this summary o BP Pulse Height Weight BMI    110/68 mmHg 72 5' 9\" (1.753 m) 230 lb (104.327 kg) 33.95 kg/m2         Current Medications          This list is accurate as of: 3/28/17  3:00 PM.  Always use your most recent med list.                ALAWAY 0.025 % Soln   G Take 5,000 mcg by mouth every other day. Commonly known as:  VITAMIN B12           Vitamin C 500 MG Tabs   Take 500 mg by mouth daily. Commonly known as:  VITAMIN C           * Notice:   This list has 2 medication(s) that are the same as other medicatio

## (undated) NOTE — Clinical Note
2/6/2017          To Whom It May Concern:    Huy Mir is currently under my medical care. Please accommodate Shawnee Bobby to work half days until further notice. If you require additional information please contact our office.         Sincerely,

## (undated) NOTE — MR AVS SNAPSHOT
[facility]  [address] [place] [address], 45 [facility]  Kelsey Roland               Thank you for choosing us for your health care visit with Tamie Keenan. DO Rocio.   We are glad to serve you and happy to provide you with this summary be able to accompany the child in the testing room. Parents will remain in the MRI waiting area and be reunited with the child upon completion of the MRI. Follow-up Instructions     Return in about 2 weeks (around 3/9/2017).          Reason for Take 1 tablet (40 mg total) by mouth 2 (two) times daily before meals. What changed:  when to take this   Commonly known as:  PROTONIX           SINGULAIR 10 MG Tabs   Generic drug:  Montelukast Sodium   Take 1 tablet (10 mg total) by mouth nightly.

## (undated) NOTE — MR AVS SNAPSHOT
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   At [facility] we continue to have an Open

## (undated) NOTE — MR AVS SNAPSHOT
[facility]  [address] [place] [address], [address] [place] [address]               Thank you for choosing us for your health care visit with Preethi Medina. DO Rocio.   We are glad to serve you and happy to provide you with this summary Commonly known as:  FLONASE           Ipratropium Bromide 0.06 % Soln   2 sprays by Nasal route 3 (three) times daily as needed for Rhinitis.    Commonly known as:  ATROVENT           Levocetirizine Dihydrochloride 5 MG Tabs   Take 1 tablet (5 mg total) by Basic Metabolic Panel (8) [E]    Complete by: Feb 01, 2017 (Approximate)    Assoc Dx:  Eye discharge [H57.8], RUQ abdominal pain [R10.11], Epigastric abdominal pain [R10.13]           Hepatic Function Panel (7) [E]    Complete by:   Feb 01, 2017 ([facility]    It is the patient's responsibility to check with and follow their insurance company's guidelines for prior authorization for this test.  You may be held responsible for payment in full if

## (undated) NOTE — MR AVS SNAPSHOT
[facility] SPECIALTY [facility] [place] [address] [address] [address] [phone]               Thank you for choosing us for your health care visit with Eugene So MD.  We are glad to serve you and happy to provide you with this summary o Take 1 tablet (100 mg total) by mouth 2 (two) times daily. Commonly known as:  SYMMETREL           Budesonide-Formoterol Fumarate 160-4.5 MCG/ACT Aero   Inhale 2 puffs into the lungs 2 (two) times daily.    Commonly known as:  SYMBICORT           Fluticas

## (undated) NOTE — MR AVS SNAPSHOT
[facility] SPECIALTY [facility] [place] [address] [address] [id number] [phone]               Thank you for choosing us for your health care visit with Cece Rod MD.  We are glad to serve you and happy to provide you with this summary o Commonly known as:  PULMICORT           Budesonide-Formoterol Fumarate 80-4.5 MCG/ACT Aero   Inhale 2 puffs into the lungs 2 (two) times daily.    Commonly known as:  SYMBICORT           Esomeprazole Magnesium 40 MG Cpdr   Take 1 capsule (40 mg total) by mo your doctor or other care provider to review them with you.          Where to Get Your Medications      These medications were sent to [address] [place] [address], 500 [facility] [phone], [address] [place] [address], [facility] [address]

## (undated) NOTE — Clinical Note
2/6/2017          To Whom It May Concern:    Apollo Partida is currently under my medical care and may not return to { school/work:[id number]} at this time. Please excuse Toño Barney for {NUMBERS 0-10:4432} {days weeks:3323::\"days\"}.   {HE/SHE :1414

## (undated) NOTE — LETTER
[address] [address]  PH: [phone]  FAX: [phone]    12/26/24            To Whom It May Concern:      Kolby Allen, Is currently under my medical care, He may return back to work on Monday, 12/20/2024.  If you require additional information please contact our office,                Sincerely,            Javi Layton MD, MPH.

## (undated) NOTE — LETTER
AUTHORIZATION FOR SURGICAL OPERATION OR OTHER PROCEDURE    1.  I hereby authorize Dr. Daljit Thomas, and [facility] staff assigned to my case to perform the following operation and/or procedure at the [facility]:    ________________________ Patient Name:  ______Anthony Wresinki________________________________________________  (please print)      Patient signature:  ___________________________________________________             Relationship to Patient:           []  Parent    Responsible

## (undated) NOTE — Clinical Note
1/26/2017          To Whom It May Concern:    Janie Self is currently under my medical care and may not return to work at this time. Please excuse Everette Sorensen from work starting 01/24/2017. He may return to work on 01/26/2017.   Activity is restricte

## (undated) NOTE — MR AVS SNAPSHOT
[facility]  [address]  1200 [facility]  [phone]               Thank you for choosing us for your health care visit with Gloria Serrano. DO Rocio.   We are glad to serve you and happy to provide you with this swan Take 1 capsule (300 mg total) by mouth 3 (three) times daily.    Commonly known as:  CLEOCIN           Fluticasone Propionate 50 MCG/ACT Susp   USE 2 SPRAYS IN EACH NOSTRIL DAILY   Commonly known as:  FLONASE           Ipratropium Bromide 0.06 % Soln   2 sp

## (undated) NOTE — LETTER
[facility] [place] [address]  [address] [address]  [phone]     Patient: Magdaleno Lechuga   YOB: 1980   Date of Visit: 11/4/2020     Dear Employer,        November 4, 2020    At [facility], we are taking special p Persons infected with SARS-CoV-2 who never develop COVID-19 symptoms may discontinue isolation and other precautions 10 days after the date of their first positive RT-PCR test for SARS-CoV-2 RNA.     Persons who are asymptomatic but have been exposed, CDC r

## (undated) NOTE — MR AVS SNAPSHOT
[facility]  [address] [place] [address], [address] [place]               Thank you for choosing us for your health care visit with Renée Baker. DO Rocio.   We are glad to serve you and happy to provide you with this summary Place 2 drops into the right eye 2 (two) times daily. * albuterol sulfate (2.5 MG/3ML) 0.083% Nebu   Take 3 mL (2.5 mg total) by nebulization every 6 (six) hours as needed for Wheezing.    Commonly known as:  VENTOLIN           * Albuterol Sulfate Phone:  [phone]    - Lidocaine Viscous 2 % Soln            Joshua                  Visit [facility] online at  [email or web address]

## (undated) NOTE — LETTER
06/28/21        [address] [facility] [place]      Dear Margo Frederick,    [facility] records indicate that you have outstanding lab work and or testing that was ordered for you and has not yet been completed:  Orders Placed This Encounter

## (undated) NOTE — Clinical Note
2/6/2017              Herma Curling Wrzesinski        [address] [employer] [address]         To whom it may concern,     Bina Aparicio is currently under my medical care. He may return to work with restrictions 02/07/2017.   Activity is re

## (undated) NOTE — Clinical Note
2/6/2017          To Whom It May Concern:    Juan Bob is currently under my medical care. He may return to work with restrictions 02/07/2017. Activity is restricted as follows: Work only half his hours and avoid heavy lifting.  No more than 10

## (undated) NOTE — Clinical Note
2/23/2017              [facility]         Dear José Miguel Hope,    You need intermittent family medical leave. You would support 4 episodes per month 2 days per episode.  Send the form [phone] attn

## (undated) NOTE — Clinical Note
4/11/2017          To Whom It May Concern:    Sidra Maher is currently under my medical care. Please excuse Echo Garcia from 4/4/17 through 4/10/17. He may return to work on 4/11/17 without any activity restrictions.       If you require additional

## (undated) NOTE — LETTER
1/29/2025    Kolby Villa  [address] [place] [address]         Dear Kolby,    This letter is to inform you that our office has made several attempts to reach you by phone without success.  We were attempting to contact you by phone regarding prescription request    Please contact our office at the number listed below as soon as you receive this letter to discuss this issue and to make the necessary changes in our system to your contact information.  Thank you for your cooperation.        Sincerely,    DO Dasia Kellogg [facility] [address]  Ph: [phone]  Fax: [phone]         Document electronically generated by:  Delia MEJÍA RN

## (undated) NOTE — LETTER
8/1/2022      To Whom It May Concern:    Brandon Hanson is currently under my medical care. Activity is restricted as follows: light duty. If you require additional information please contact our office. Sincerely,      Nataly Jarrett.  Sunni Nielsen [address]   [phone]

## (undated) NOTE — LETTER
To Whom It May Concern:    Valerie Schawrz has been under our care regarding ongoing medical issues. Because of this, he has been required to restrict her physical activities.     He may resume his usual activities, including work, on 11/13/2020 with

## (undated) NOTE — LETTER
10/20/2021              [facility] [address]         Dear Glenn Kee,    This letter is to inform you that our office has made several attempts to reach you by phone without success.   We were attempting to conta

## (undated) NOTE — LETTER
7/28/2022           To Whom It May Concern:    Yolis Weiss is currently under my medical care and may not return to work at this time. Please excuse Loren Eubanks for 3 days. He may return to work on 07/30/2022. Activity is restricted as follows: none. If you require additional information please contact our office. Sincerely,      Phyllis Capellan. Viktoriya Hager DO          Document generated by:  Phyllis Capellan.  DO Rocio

## (undated) NOTE — MR AVS SNAPSHOT
[facility]  [address]  1200 [facility]  [phone]               Thank you for choosing us for your health care visit with Huma Watson. DO Rocio.   We are glad to serve you and happy to provide you with this sum USE 2 SPRAYS IN EACH NOSTRIL DAILY   Commonly known as:  FLONASE           Ipratropium Bromide 0.06 % Soln   2 sprays by Nasal route 3 (three) times daily as needed for Rhinitis.    Commonly known as:  ATROVENT           Levocetirizine Dihydrochloride 5 MG

## (undated) NOTE — MR AVS SNAPSHOT
[facility]  [address] [place] [address], [address] [place] [address]               Thank you for choosing us for your health care visit with Teodoro Reagan. DO Rocio.   We are glad to serve you and happy to provide you with this summary Commonly known as:  FLONASE           Ipratropium Bromide 0.06 % Soln   2 sprays by Nasal route 3 (three) times daily as needed for Rhinitis.    Commonly known as:  ATROVENT           Levocetirizine Dihydrochloride 5 MG Tabs   Take 1 tablet (5 mg total) by

## (undated) NOTE — Clinical Note
ASTHMA ACTION PLAN for Carol Self     : 1980     Date: 2017  Doctor:  Steffany Fernandez.  Starr Grijalva DO  Phone for doctor or clinic: James [address] [place] [address] [phone]  Person If symptoms are not improving in 24-48 hrs, call office for further instructions  Medications     Leukotriene Modulators Instructions    SINGULAIR 10 MG Oral Tab Take 1 tablet (10 mg total) by mouth nightly.     Sympathomimetics Instructions    Budesonide-

## (undated) NOTE — MR AVS SNAPSHOT
[facility]  [address] [place] [address], [address] [place] [address]  [phone]               Thank you for choosing us for your health care visit with Nurse. We are glad to serve you and happy to provide you with this summary of your visit. Budesonide-Formoterol Fumarate 160-4.5 MCG/ACT Aero   Inhale 2 puffs into the lungs 2 (two) times daily.    Commonly known as:  SYMBICORT           Fluticasone Propionate 50 MCG/ACT Susp   USE 2 SPRAYS IN EACH NOSTRIL DAILY   Commonly known as:  FLONASE

## (undated) NOTE — IP AVS SNAPSHOT
[facility]    [address], [place] ~ [phone]                Discharge Summary   3/10/2017    Mr.  [address] [facility]           Admission Information        Provider Department    3/10/2017 Miky * Notice: This list has 2 medication(s) that are the same as other medications prescribed for you. Read the directions carefully, and ask your doctor or other care provider to review them with you.               Patient Orase 98 4. Follow-up: Any biopsies taken will have results available in 3 working days. We do not discuss any results of the procedure with the patient the day of the test because you will not remember.  Whoever was waiting for you in the waiting room will know amy Neutrophil % Lymphocyte % Monocyte % Eosinophil % Basophil % Prelim Neut Abs Final Neut Abs Lymphocyte Abso Monocyte Absolu Eosinophil Abso Basophil Absolu    (02/18/17)  60 (02/18/17)  29 (02/18/17)  7 (02/18/17)  4 (02/18/17)  1 -- (02/18/17)  5.9 (02/1

## (undated) NOTE — Clinical Note
2/9/2017          To Whom It May Concern:    Gage Woo is currently under my medical care. He  may  return to work with restrictions before surgery on March 3, 2017  Activity is restricted as follows:  Will work only limited hours at a time with

## (undated) NOTE — LETTER
8/26/2021              [address]         Dear Dominique Zheng,    This letter is to inform you that our office has made several attempts to reach you by phone without success.   We were attempting to contac

## (undated) NOTE — LETTER
3/2/2022              [address]         To Whom It May Concern,    Roxana Hernandez can return to work.       Sincerely,  Dr.SpeiwakAlbert  [phone]

## (undated) NOTE — LETTER
4/14/2022              [address]         Dear Milan Jay,    This letter is to inform you that our office has made several attempts to reach you by phone without success. We were attempting to contact you by phone regarding illness or problem. Please contact our office at the number listed below as soon as you receive this letter to discuss this issue and to make the necessary changes in our system to your contact information. Thank you for your cooperation. Sincerely,    [address]  [address]   [phone]        Document electronically generated by:  Dotty Davenport RN

## (undated) NOTE — LETTER
12/26/2024      To Whom It May Concern:    Kolby Villa is currently under my medical care and may not return to work at this time.    Please excuse Kolby for 8 days.  He may return to work on Monday 12/30/24.  Activity is restricted as follows: none.    If you require additional information please contact our office.      Sincerely,    Javi Layton MD  [address] [place] [address]  Ph: [phone]  Fax: [phone]       Document generated by:  Diana LLANOS RN

## (undated) NOTE — LETTER
8/18/2018              Lorena Villa        [address] [place] [address]         To Whom it May Concern:    Maira Carey had surgery on his finger. He is not to lift more than 10lbs for 2 weeks.   He is not to get his finger wet during th

## (undated) NOTE — Clinical Note
3/3/2017              [facility]         Dear Oliva Yadav,  Please advise your employer that you may only work limited hours due to a medical condition. Sincerely,    Mainor Gutierrez.  Rocio,   [facility]

## (undated) NOTE — IP AVS SNAPSHOT
[address] [facility] ~ [phone]                Discharge Summary   4/4/2017    Mr. Valerie Schwarz           Admission Information        Provider Department    4/4/2017 Isabel Bryan MD Em What changed: You were already taking a medication with the same name, and this prescription was added. Make sure you understand how and when to take each.         Take 2 capsules by mouth 30 minutes prior to breakfast and 30 minutes prior to evening meal. Budesonide-Formoterol Fumarate 160-4.5 MCG/ACT Aero   Commonly known as:  SYMBICORT        Inhale 2 puffs into the lungs 2 (two) times daily.     Elvin Matter                              Fluticasone Propionate 50 MCG/ACT Susp   Commonly known as:  F Phone:  [phone]    - Esomeprazole Magnesium 20 MG Cpdr  - sucralfate 1 g Tabs      Please  your prescriptions at the location directed by your doctor or nurse     Bring a paper prescription for each of these medications    - fluconazole 100 M Basophil Absolu    (04/06/17)  88 (04/06/17)  10 (04/06/17)  2 (04/06/17)  0 (04/06/17)  0 -- (04/06/17)  9.4 (H) (04/06/17)  1.1 (04/06/17)  0.2 (04/06/17)  0.0 (04/06/17)  0.0    (04/05/17)  89 (04/05/17)  8 (04/05/17)  2 (04/05/17)  0 (04/05/17)  0  (04 coverage. Patient [address] is a Federal Navigator program that can help with your Affordable Care Act coverage, as well as all types of Medicaid plans.   To get signed up and covered, please call [phone] and ask to get set up for an insuran sucralfate (CARAFATE) 1 g Oral Tab    Esomeprazole Magnesium (NEXIUM) 20 MG Oral Capsule Delayed Release    sucralfate 1 g Oral Tab    Esomeprazole Magnesium (NEXIUM) 40 MG Oral Capsule Delayed Release       Use:  Nausea/vomiting, acid reflux, low bowel m Levocetirizine Dihydrochloride (XYZAL) 5 MG Oral Tab         Use:  Treat Allergies   Most common side effects:  Drowsiness, dry mouth   What to report to your healthcare team: Changes in thinking, confusion, palpitations           All Other Medications

## (undated) NOTE — LETTER
06/04/21        [place]      Dear Marycruz Bhandari,    [facility] records indicate that you have outstanding lab work and or testing that was ordered for you and has not yet been completed:  Orders Placed This Encounter

## (undated) NOTE — LETTER
Laila Hernandez [address], [address]       01/06/20        Patient: Juan Bob   YOB: 1980   Date of Visit: 1/6/2020       Dear  Dr. Winford Osler Recipients,      Thank you for referring Pamela Michel

## (undated) NOTE — LETTER
09/04/21        101[address] [facility] [place]      Dear Glenn Kee,    7[facility] records indicate that you have outstanding lab work and or testing that was ordered for you and has not yet been completed:  Orders Placed This Encounter

## (undated) NOTE — MR AVS SNAPSHOT
[facility]  [address] [place] [address], [address] [place] [address]               Thank you for choosing us for your health care visit with Shalini Quigley. DO Rocio.   We are glad to serve you and happy to provide you with this summary your appointment immediately. However, if you are unsure about the requirements for authorization, please wait 5-7 days and then contact your physician's   office.  At that time, you will be provided with any authorization numbers or be assured that none ar Take 1 capsule (40 mg total) by mouth daily.    Commonly known as:  NEXIUM           Fluticasone Propionate 50 MCG/ACT Susp   USE 2 SPRAYS IN EACH NOSTRIL DAILY   Commonly known as:  FLONASE           Ipratropium Bromide 0.06 % Soln   2 sprays by Nasal rout

## (undated) NOTE — LETTER
7/1/2019    [facility]            Dear Media Awe,      Our records indicate that you were due for an appointment for a EGD or Upper Endoscopy on or about April 2019 with Israel Castañeda MD.

## (undated) NOTE — LETTER
Tricia Barney Do  [address], [address]       01/06/20        Patient: Huy Mir   YOB: 1980   Date of Visit: 1/6/2020       Dear  Dr. Mayito Hinkle DO,      Thank you for referring Huy Mir

## (undated) NOTE — MR AVS SNAPSHOT
[facility]  [address] [facility] [place] [id number] [phone]               Thank you for choosing us for your health care visit with Juancho Delaney MD.  We are glad to serve you and happy to provide you with this summary of your visit. Inhale 2 puffs into the lungs 2 (two) times daily. What changed:  Another medication with the same name was removed. Continue taking this medication, and follow the directions you see here.    Commonly known as:  SYMBICORT           Esomeprazole Magnesium

## (undated) NOTE — LETTER
2/3/2018          To Whom It May Concern:    Brendan Short is currently under my medical care . Please excuse Caleb Wallace from work 02/02/2018. He may return to work on 02/05/2018. Activity is restricted as follows: none.     If you require additional